# Patient Record
Sex: FEMALE | Race: ASIAN | NOT HISPANIC OR LATINO | Employment: UNEMPLOYED | URBAN - METROPOLITAN AREA
[De-identification: names, ages, dates, MRNs, and addresses within clinical notes are randomized per-mention and may not be internally consistent; named-entity substitution may affect disease eponyms.]

---

## 2023-04-10 ENCOUNTER — HOSPITAL ENCOUNTER (OUTPATIENT)
Dept: DATA CONVERSION | Facility: HOSPITAL | Age: 58
End: 2023-04-10
Attending: ANESTHESIOLOGY | Admitting: ANESTHESIOLOGY
Payer: MEDICAID

## 2023-04-10 DIAGNOSIS — M54.2 CERVICALGIA: ICD-10-CM

## 2023-04-10 DIAGNOSIS — E78.5 HYPERLIPIDEMIA, UNSPECIFIED: ICD-10-CM

## 2023-04-10 DIAGNOSIS — I10 ESSENTIAL (PRIMARY) HYPERTENSION: ICD-10-CM

## 2023-04-10 DIAGNOSIS — M54.12 RADICULOPATHY, CERVICAL REGION: ICD-10-CM

## 2023-04-10 DIAGNOSIS — E11.9 TYPE 2 DIABETES MELLITUS WITHOUT COMPLICATIONS (MULTI): ICD-10-CM

## 2023-04-10 DIAGNOSIS — N28.89 OTHER SPECIFIED DISORDERS OF KIDNEY AND URETER: ICD-10-CM

## 2023-04-10 LAB — POCT GLUCOSE: 98 MG/DL (ref 74–99)

## 2023-10-02 NOTE — OP NOTE
Post Operative Note:     Post-Procedure Diagnosis: Cervical radiculitis   Procedure: 1.   2.   3.   4.   5.   Surgeon: Nikhil   Resident/Fellow/Other Assistant: Edwin   Estimated Blood Loss (mL): none   Specimen: no   Findings: None     Operative Report Dictated:  Dictation: not applicable - note contains Operative  Report   Operative Report:    Preoperative diagnosis:  Cervical radiculitis  Postoperative diagnosis:  Cervical radiculitis  Procedure: C6/7 cervical epidural steroid injection under fluoroscopic guidance  Surgeon: Marylou Tejeda  Assistant:  Fellow  Anesthesia: Local  Complications: Apparently none    Clinical note: Luis is a 58-year-old female with history of neck and recurrent upper extremity symptoms who presents today for the aforementioned procedure.    Procedure note: The patient was met in the preoperative holding area after risks benefits and alternatives to procedure were discussed with the patient, informed consent was obtained. Patient brought back to the procedure room and placed in the prone  position on the fluoroscopy table. Area over the neck was exposed, prepped, draped, in the usual sterile fashion.  Skin and subcutaneous tissues to the cervical intralaminar space was anesthetized using 0.5% lidocaine.  An 18-gauge Tuohy needle was inserted  in the skin and advanced into the interlaminar space. A glass syringe was used to achieve the epidural space using the loss resistance technique. Contrast was injected which showed appropriate epidural spread, no intravascular or intrathecal uptake. A  total of 2 mL's of 0.5% lidocaine mixed with 40 mg methylprednisolone was injected. Needle removed, bandage applied, patient tolerated the procedure well with no immediate complications.      Attestation:   Note Completion:  Attending Attestation I was present for the entire procedure         Electronic Signatures:  Marylou Tejeda)  (Signed 10-Apr-2023 08:32)   Authored: Post  Operative Note, Note Completion      Last Updated: 10-Apr-2023 08:32 by Marylou Tejeda)

## 2023-10-23 ENCOUNTER — LAB (OUTPATIENT)
Dept: LAB | Facility: LAB | Age: 58
End: 2023-10-23
Payer: MEDICAID

## 2023-10-24 PROCEDURE — 86833 HLA CLASS II HIGH DEFIN QUAL: CPT | Mod: OUT | Performed by: SURGERY

## 2023-10-26 ENCOUNTER — LAB REQUISITION (OUTPATIENT)
Dept: LAB | Facility: CLINIC | Age: 58
End: 2023-10-26
Payer: MEDICAID

## 2023-10-26 DIAGNOSIS — N18.6 END STAGE RENAL DISEASE (MULTI): ICD-10-CM

## 2023-10-30 ENCOUNTER — DOCUMENTATION (OUTPATIENT)
Dept: TRANSPLANT | Facility: HOSPITAL | Age: 58
End: 2023-10-30
Payer: MEDICAID

## 2023-10-30 DIAGNOSIS — Z01.818 PRE-TRANSPLANT EVALUATION FOR KIDNEY TRANSPLANT: ICD-10-CM

## 2023-10-30 NOTE — PROGRESS NOTES
Simon called requesting the CBC order for his mom; I entered this in Epic and let him know.  We need to review this to see if her thrombocytopenia is better.

## 2023-11-03 LAB
HLA RESULTS: NORMAL
HLA-A+B+C AB NFR SER: NORMAL %
HLA-DP+DQ+DR AB NFR SER: NORMAL %

## 2023-11-10 ENCOUNTER — LAB (OUTPATIENT)
Dept: LAB | Facility: LAB | Age: 58
End: 2023-11-10
Payer: MEDICAID

## 2023-11-10 DIAGNOSIS — Z01.818 PRE-TRANSPLANT EVALUATION FOR KIDNEY TRANSPLANT: ICD-10-CM

## 2023-11-10 LAB
ERYTHROCYTE [DISTWIDTH] IN BLOOD BY AUTOMATED COUNT: 13 % (ref 11.5–14.5)
HCT VFR BLD AUTO: 30 % (ref 36–46)
HGB BLD-MCNC: 10 G/DL (ref 12–16)
MCH RBC QN AUTO: 32.7 PG (ref 26–34)
MCHC RBC AUTO-ENTMCNC: 33.3 G/DL (ref 32–36)
MCV RBC AUTO: 98 FL (ref 80–100)
NRBC BLD-RTO: 0 /100 WBCS (ref 0–0)
PLATELET # BLD AUTO: 66 X10*3/UL (ref 150–450)
RBC # BLD AUTO: 3.06 X10*6/UL (ref 4–5.2)
WBC # BLD AUTO: 6.4 X10*3/UL (ref 4.4–11.3)

## 2023-11-10 PROCEDURE — 36415 COLL VENOUS BLD VENIPUNCTURE: CPT

## 2023-11-10 PROCEDURE — 85027 COMPLETE CBC AUTOMATED: CPT

## 2023-11-15 ENCOUNTER — TELEPHONE (OUTPATIENT)
Dept: TRANSPLANT | Facility: HOSPITAL | Age: 58
End: 2023-11-15
Payer: MEDICAID

## 2023-11-15 DIAGNOSIS — D69.6 THROMBOCYTOPENIA (CMS-HCC): ICD-10-CM

## 2023-11-15 NOTE — TELEPHONE ENCOUNTER
Spoke to son; let him know his mom's platelet counts were still low and that she needed to see the hematologist.  I explained that platelets help with blood clotting.  He asked if there was a treatment for this and I assured him that there was.  Let him know that the referrals department would reach out to schedule an appt for him and if they didn't call him in a week he should call us back for assistance.  No further questions at this time. Referral to heme/onc (benign) entered in Epic.

## 2023-12-28 PROBLEM — H25.13 AGE-RELATED NUCLEAR CATARACT OF BOTH EYES: Chronic | Status: ACTIVE | Noted: 2017-08-10

## 2023-12-28 PROBLEM — E11.9 TYPE 2 DIABETES MELLITUS WITHOUT COMPLICATION, WITHOUT LONG-TERM CURRENT USE OF INSULIN (MULTI): Chronic | Status: ACTIVE | Noted: 2017-08-10

## 2023-12-28 PROBLEM — R41.3 MEMORY LOSS: Status: ACTIVE | Noted: 2017-03-31

## 2023-12-28 PROBLEM — D69.6 PLATELETS DECREASED (CMS-HCC): Status: ACTIVE | Noted: 2023-12-27

## 2023-12-28 PROBLEM — E66.9 OBESITY, CLASS I, BMI 30-34.9: Status: ACTIVE | Noted: 2019-02-20

## 2023-12-28 PROBLEM — E66.811 OBESITY, CLASS I, BMI 30-34.9: Status: ACTIVE | Noted: 2019-02-20

## 2023-12-28 PROBLEM — E11.22 DIABETES MELLITUS WITH CHRONIC KIDNEY DISEASE (MULTI): Status: ACTIVE | Noted: 2023-12-28

## 2023-12-28 PROBLEM — I10 ESSENTIAL HYPERTENSION: Status: ACTIVE | Noted: 2017-03-31

## 2023-12-28 PROBLEM — R10.9 RIGHT FLANK PAIN: Status: ACTIVE | Noted: 2020-01-13

## 2023-12-28 PROBLEM — I67.1 CEREBRAL ANEURYSM, NONRUPTURED (HHS-HCC): Status: ACTIVE | Noted: 2022-06-06

## 2023-12-28 PROBLEM — I77.0 ARTERIOVENOUS FISTULA (CMS-HCC): Status: ACTIVE | Noted: 2022-12-09

## 2023-12-28 PROBLEM — D64.9 ANEMIA: Status: ACTIVE | Noted: 2022-05-26

## 2023-12-28 PROBLEM — E78.2 MIXED HYPERLIPIDEMIA: Status: ACTIVE | Noted: 2023-04-10

## 2023-12-28 PROBLEM — N18.9 CKD (CHRONIC KIDNEY DISEASE): Status: ACTIVE | Noted: 2017-03-31

## 2023-12-28 PROBLEM — M79.89 LEFT ARM SWELLING: Status: ACTIVE | Noted: 2017-12-19

## 2023-12-28 PROBLEM — G47.00 INSOMNIA: Status: ACTIVE | Noted: 2022-05-26

## 2023-12-28 PROBLEM — Q61.3 POLYCYSTIC KIDNEY, UNSPECIFIED: Status: ACTIVE | Noted: 2017-09-08

## 2023-12-28 PROBLEM — H52.4 PRESBYOPIA: Chronic | Status: ACTIVE | Noted: 2017-08-10

## 2023-12-28 PROBLEM — H54.7 VISION IMPAIRMENT: Status: ACTIVE | Noted: 2017-03-31

## 2023-12-28 PROBLEM — K21.9 GASTROESOPHAGEAL REFLUX DISEASE WITHOUT ESOPHAGITIS: Status: ACTIVE | Noted: 2017-03-31

## 2023-12-28 RX ORDER — CHOLECALCIFEROL (VITAMIN D3) 50 MCG
50 TABLET ORAL DAILY
COMMUNITY
Start: 2020-08-05

## 2023-12-28 RX ORDER — OMEPRAZOLE 40 MG/1
40 CAPSULE, DELAYED RELEASE ORAL
COMMUNITY
Start: 2023-11-28

## 2023-12-28 RX ORDER — FERROUS SULFATE 325(65) MG
1 TABLET ORAL 3 TIMES DAILY
COMMUNITY
Start: 2022-05-26

## 2023-12-28 RX ORDER — SODIUM BICARBONATE 650 MG/1
650 TABLET ORAL
COMMUNITY
Start: 2023-09-27

## 2023-12-28 RX ORDER — HYDRALAZINE HYDROCHLORIDE 25 MG/1
25 TABLET, FILM COATED ORAL 3 TIMES DAILY
COMMUNITY
Start: 2023-11-28

## 2023-12-28 RX ORDER — CARVEDILOL 25 MG/1
25 TABLET ORAL
COMMUNITY
Start: 2018-11-06

## 2023-12-28 RX ORDER — ASPIRIN 81 MG/1
81 TABLET ORAL DAILY
COMMUNITY
Start: 2023-09-22 | End: 2024-03-20

## 2023-12-28 RX ORDER — CALCITRIOL 0.25 UG/1
0.25 CAPSULE ORAL DAILY
COMMUNITY
Start: 2022-05-26

## 2023-12-28 RX ORDER — GLIPIZIDE 5 MG/1
5 TABLET ORAL DAILY
COMMUNITY
Start: 2018-11-06

## 2023-12-28 RX ORDER — LANCETS 33 GAUGE
EACH MISCELLANEOUS
COMMUNITY
Start: 2023-04-06

## 2023-12-28 RX ORDER — SIMVASTATIN 20 MG/1
1 TABLET, FILM COATED ORAL DAILY
COMMUNITY
Start: 2022-05-26

## 2023-12-28 RX ORDER — TRAZODONE HYDROCHLORIDE 50 MG/1
1 TABLET ORAL NIGHTLY PRN
COMMUNITY
Start: 2022-05-26

## 2023-12-28 RX ORDER — ONDANSETRON 4 MG/1
4 TABLET, ORALLY DISINTEGRATING ORAL EVERY 6 HOURS
COMMUNITY

## 2024-01-10 ENCOUNTER — OFFICE VISIT (OUTPATIENT)
Dept: HEMATOLOGY/ONCOLOGY | Facility: CLINIC | Age: 59
End: 2024-01-10
Payer: MEDICAID

## 2024-01-10 VITALS
SYSTOLIC BLOOD PRESSURE: 149 MMHG | HEART RATE: 89 BPM | BODY MASS INDEX: 29.37 KG/M2 | RESPIRATION RATE: 16 BRPM | WEIGHT: 159.61 LBS | OXYGEN SATURATION: 99 % | TEMPERATURE: 97.9 F | DIASTOLIC BLOOD PRESSURE: 79 MMHG | HEIGHT: 62 IN

## 2024-01-10 DIAGNOSIS — D69.6 THROMBOCYTOPENIA (CMS-HCC): Primary | ICD-10-CM

## 2024-01-10 LAB
ALBUMIN SERPL BCP-MCNC: 3.9 G/DL (ref 3.4–5)
ALP SERPL-CCNC: 67 U/L (ref 33–136)
ALT SERPL W P-5'-P-CCNC: 19 U/L (ref 7–45)
ANION GAP SERPL CALC-SCNC: 14 MMOL/L (ref 10–20)
APTT PPP: 34 SECONDS (ref 27–38)
AST SERPL W P-5'-P-CCNC: 20 U/L (ref 9–39)
BASOPHILS # BLD AUTO: 0.03 X10*3/UL (ref 0–0.1)
BASOPHILS NFR BLD AUTO: 0.3 %
BILIRUB SERPL-MCNC: 0.6 MG/DL (ref 0–1.2)
BUN SERPL-MCNC: 46 MG/DL (ref 6–23)
CALCIUM SERPL-MCNC: 8.9 MG/DL (ref 8.6–10.3)
CHLORIDE SERPL-SCNC: 110 MMOL/L (ref 98–107)
CO2 SERPL-SCNC: 18 MMOL/L (ref 21–32)
CREAT SERPL-MCNC: 5.58 MG/DL (ref 0.5–1.05)
EGFRCR SERPLBLD CKD-EPI 2021: 8 ML/MIN/1.73M*2
EOSINOPHIL # BLD AUTO: 0.14 X10*3/UL (ref 0–0.7)
EOSINOPHIL NFR BLD AUTO: 1.5 %
ERYTHROCYTE [DISTWIDTH] IN BLOOD BY AUTOMATED COUNT: 13.5 % (ref 11.5–14.5)
FERRITIN SERPL-MCNC: 557 NG/ML (ref 8–150)
FIBRINOGEN PPP-MCNC: 345 MG/DL (ref 200–400)
GLUCOSE SERPL-MCNC: 212 MG/DL (ref 74–99)
HCT VFR BLD AUTO: 30.1 % (ref 36–46)
HGB BLD-MCNC: 9.4 G/DL (ref 12–16)
IMM GRANULOCYTES # BLD AUTO: 0.01 X10*3/UL (ref 0–0.7)
IMM GRANULOCYTES NFR BLD AUTO: 0.1 % (ref 0–0.9)
INR PPP: 1.1 (ref 0.9–1.1)
IRON SATN MFR SERPL: 31 % (ref 25–45)
IRON SERPL-MCNC: 81 UG/DL (ref 35–150)
LDH SERPL L TO P-CCNC: 142 U/L (ref 84–246)
LYMPHOCYTES # BLD AUTO: 1.07 X10*3/UL (ref 1.2–4.8)
LYMPHOCYTES NFR BLD AUTO: 11.2 %
MCH RBC QN AUTO: 32.8 PG (ref 26–34)
MCHC RBC AUTO-ENTMCNC: 31.2 G/DL (ref 32–36)
MCV RBC AUTO: 105 FL (ref 80–100)
MONOCYTES # BLD AUTO: 0.59 X10*3/UL (ref 0.1–1)
MONOCYTES NFR BLD AUTO: 6.2 %
NEUTROPHILS # BLD AUTO: 7.68 X10*3/UL (ref 1.2–7.7)
NEUTROPHILS NFR BLD AUTO: 80.7 %
NRBC BLD-RTO: ABNORMAL /100{WBCS}
PLATELET # BLD AUTO: 76 X10*3/UL (ref 150–450)
PLATELET # BLD AUTO: 89 X10*3/UL (ref 150–450)
POTASSIUM SERPL-SCNC: 3.7 MMOL/L (ref 3.5–5.3)
PROT SERPL-MCNC: 7 G/DL (ref 6.4–8.2)
PROTHROMBIN TIME: 12.8 SECONDS (ref 9.8–12.8)
RBC # BLD AUTO: 2.87 X10*6/UL (ref 4–5.2)
RBC MORPH BLD: NORMAL
SODIUM SERPL-SCNC: 138 MMOL/L (ref 136–145)
TIBC SERPL-MCNC: 261 UG/DL (ref 240–445)
UIBC SERPL-MCNC: 180 UG/DL (ref 110–370)
WBC # BLD AUTO: 9.5 X10*3/UL (ref 4.4–11.3)

## 2024-01-10 PROCEDURE — 86334 IMMUNOFIX E-PHORESIS SERUM: CPT | Mod: GEALAB | Performed by: PHYSICIAN ASSISTANT

## 2024-01-10 PROCEDURE — 81450 HL NEO GSAP 5-50DNA/DNA&RNA: CPT | Performed by: PHYSICIAN ASSISTANT

## 2024-01-10 PROCEDURE — 86706 HEP B SURFACE ANTIBODY: CPT | Mod: GEALAB | Performed by: PHYSICIAN ASSISTANT

## 2024-01-10 PROCEDURE — 85025 COMPLETE CBC W/AUTO DIFF WBC: CPT | Performed by: PHYSICIAN ASSISTANT

## 2024-01-10 PROCEDURE — 82607 VITAMIN B-12: CPT | Mod: GEALAB | Performed by: PHYSICIAN ASSISTANT

## 2024-01-10 PROCEDURE — 86320 SERUM IMMUNOELECTROPHORESIS: CPT | Performed by: PHYSICIAN ASSISTANT

## 2024-01-10 PROCEDURE — 84165 PROTEIN E-PHORESIS SERUM: CPT | Performed by: PHYSICIAN ASSISTANT

## 2024-01-10 PROCEDURE — 82728 ASSAY OF FERRITIN: CPT | Performed by: PHYSICIAN ASSISTANT

## 2024-01-10 PROCEDURE — 87340 HEPATITIS B SURFACE AG IA: CPT | Mod: GEALAB | Performed by: PHYSICIAN ASSISTANT

## 2024-01-10 PROCEDURE — 85384 FIBRINOGEN ACTIVITY: CPT | Performed by: PHYSICIAN ASSISTANT

## 2024-01-10 PROCEDURE — 83521 IG LIGHT CHAINS FREE EACH: CPT | Mod: GEALAB | Performed by: PHYSICIAN ASSISTANT

## 2024-01-10 PROCEDURE — 82784 ASSAY IGA/IGD/IGG/IGM EACH: CPT | Mod: GEALAB | Performed by: PHYSICIAN ASSISTANT

## 2024-01-10 PROCEDURE — 84155 ASSAY OF PROTEIN SERUM: CPT | Mod: GEALAB,59 | Performed by: PHYSICIAN ASSISTANT

## 2024-01-10 PROCEDURE — 85049 AUTOMATED PLATELET COUNT: CPT | Mod: 59 | Performed by: PHYSICIAN ASSISTANT

## 2024-01-10 PROCEDURE — 82668 ASSAY OF ERYTHROPOIETIN: CPT | Performed by: PHYSICIAN ASSISTANT

## 2024-01-10 PROCEDURE — 87521 HEPATITIS C PROBE&RVRS TRNSC: CPT | Mod: GEALAB | Performed by: PHYSICIAN ASSISTANT

## 2024-01-10 PROCEDURE — 88185 FLOWCYTOMETRY/TC ADD-ON: CPT | Mod: TC | Performed by: PHYSICIAN ASSISTANT

## 2024-01-10 PROCEDURE — 83550 IRON BINDING TEST: CPT | Performed by: PHYSICIAN ASSISTANT

## 2024-01-10 PROCEDURE — 3078F DIAST BP <80 MM HG: CPT | Performed by: PHYSICIAN ASSISTANT

## 2024-01-10 PROCEDURE — 99215 OFFICE O/P EST HI 40 MIN: CPT | Performed by: PHYSICIAN ASSISTANT

## 2024-01-10 PROCEDURE — 85610 PROTHROMBIN TIME: CPT | Performed by: PHYSICIAN ASSISTANT

## 2024-01-10 PROCEDURE — 1036F TOBACCO NON-USER: CPT | Performed by: PHYSICIAN ASSISTANT

## 2024-01-10 PROCEDURE — 85730 THROMBOPLASTIN TIME PARTIAL: CPT | Performed by: PHYSICIAN ASSISTANT

## 2024-01-10 PROCEDURE — G0452 MOLECULAR PATHOLOGY INTERPR: HCPCS | Performed by: PATHOLOGY

## 2024-01-10 PROCEDURE — 87389 HIV-1 AG W/HIV-1&-2 AB AG IA: CPT | Mod: GEALAB | Performed by: PHYSICIAN ASSISTANT

## 2024-01-10 PROCEDURE — 80053 COMPREHEN METABOLIC PANEL: CPT | Performed by: PHYSICIAN ASSISTANT

## 2024-01-10 PROCEDURE — 36415 COLL VENOUS BLD VENIPUNCTURE: CPT | Performed by: PHYSICIAN ASSISTANT

## 2024-01-10 PROCEDURE — 99205 OFFICE O/P NEW HI 60 MIN: CPT | Performed by: PHYSICIAN ASSISTANT

## 2024-01-10 PROCEDURE — 83950 ONCOPROTEIN HER-2/NEU: CPT | Performed by: PHYSICIAN ASSISTANT

## 2024-01-10 PROCEDURE — 83615 LACTATE (LD) (LDH) ENZYME: CPT | Performed by: PHYSICIAN ASSISTANT

## 2024-01-10 PROCEDURE — 88237 TISSUE CULTURE BONE MARROW: CPT | Performed by: PHYSICIAN ASSISTANT

## 2024-01-10 PROCEDURE — 86704 HEP B CORE ANTIBODY TOTAL: CPT | Mod: GEALAB | Performed by: PHYSICIAN ASSISTANT

## 2024-01-10 PROCEDURE — 3077F SYST BP >= 140 MM HG: CPT | Performed by: PHYSICIAN ASSISTANT

## 2024-01-10 ASSESSMENT — COLUMBIA-SUICIDE SEVERITY RATING SCALE - C-SSRS
6. HAVE YOU EVER DONE ANYTHING, STARTED TO DO ANYTHING, OR PREPARED TO DO ANYTHING TO END YOUR LIFE?: NO
2. HAVE YOU ACTUALLY HAD ANY THOUGHTS OF KILLING YOURSELF?: NO
1. IN THE PAST MONTH, HAVE YOU WISHED YOU WERE DEAD OR WISHED YOU COULD GO TO SLEEP AND NOT WAKE UP?: NO

## 2024-01-10 ASSESSMENT — ENCOUNTER SYMPTOMS
LOSS OF SENSATION IN FEET: 0
DEPRESSION: 0
OCCASIONAL FEELINGS OF UNSTEADINESS: 0

## 2024-01-10 ASSESSMENT — PATIENT HEALTH QUESTIONNAIRE - PHQ9
1. LITTLE INTEREST OR PLEASURE IN DOING THINGS: NOT AT ALL
2. FEELING DOWN, DEPRESSED OR HOPELESS: NOT AT ALL
SUM OF ALL RESPONSES TO PHQ9 QUESTIONS 1 AND 2: 0

## 2024-01-10 ASSESSMENT — PAIN SCALES - GENERAL: PAINLEVEL: 5

## 2024-01-10 NOTE — PROGRESS NOTES
Reason for Visit  Luis Panda is a 58 y.o. female w/PMH s/f polycystic kidney disease referred by Dr. Luciano for thrombocytopenia prior to being  placed on kidney transplant list.     PMH/PSH: PKD, DMII, HTN, HL, fistula left arm.  FH: Denies FH of cancers, bleeding or clotting disorder.   Soc Hx: former smoker, denies ETOH, illicit drugs; , 4 children.    History of Present Illness:  Upon review of labs, noted to have thrombocytopenia since 2021 in the 's. Normocytic-macrocytic anemia. Normal WBC.    Patient presents with son and . Translation done by son. On assessment, reports long standing h/o constipation and chronic shoulder pain otherwise feeling well. Denies bleeding or bruising.     Review of Systems: All of the systems have been reviewed and are negative for complaints except what is stated in the HPI and/or past medical history.    Allergies and Intolerances:  Allergies   Allergen Reactions    Amlodipine Unknown     LEG SWELLING    Oxycodone Unknown     Dizziness, weakness            Current Outpatient Medications   Medication Sig Dispense Refill    aspirin 81 mg EC tablet Take 1 tablet (81 mg) by mouth once daily.      calcitriol (Rocaltrol) 0.25 mcg capsule Take 1 capsule (0.25 mcg) by mouth once daily.      carvedilol (Coreg) 25 mg tablet Take 1 tablet (25 mg) by mouth 2 times a day with meals.      cholecalciferol (Vitamin D-3) 50 MCG (2000 UT) tablet Take 1 tablet (50 mcg) by mouth once daily.      ferrous sulfate, 325 mg ferrous sulfate, tablet Take 1 tablet by mouth 3 times a day.      glipiZIDE (Glucotrol) 5 mg tablet Take 1 tablet (5 mg) by mouth once daily.      hydrALAZINE (Apresoline) 25 mg tablet Take 1 tablet (25 mg) by mouth 3 times a day.      omeprazole (PriLOSEC) 40 mg DR capsule Take 1 capsule (40 mg) by mouth once daily.      ondansetron ODT (Zofran-ODT) 4 mg disintegrating tablet 1 tablet (4 mg) every 6 hours.      simvastatin (Zocor) 20 mg tablet Take 1  tablet (20 mg) by mouth once daily.      sodium bicarbonate 650 mg tablet Take 1 tablet (650 mg) by mouth.      traZODone (Desyrel) 50 mg tablet Take 1 tablet (50 mg) by mouth as needed at bedtime for sleep.      TRUEplus Lancets 33 gauge misc        No current facility-administered medications for this visit.        Vitals and Measurements:   Visit Vitals  /79 (BP Location: Right arm)   Pulse 89   Temp 36.6 °C (97.9 °F)   Resp 16        Physical Exam:   Constitutional: alert, awake and oriented, not in acute distress   HEENT: moist mucous membranes, normal nose   Neck: supple, no lymphadenopathy   EYES: PERRL, EOM intact, conjunctiva normal  Skin: no jaundice, rash or erythema  Neurological: AAOx3, no gross focal deficit   Psychiatric: normal mood and behavior     Lab Results   Component Value Date    WBC 9.5 01/10/2024    NEUTROABS 7.68 01/10/2024    IGABSOL 0.01 01/10/2024    LYMPHSABS 1.07 (L) 01/10/2024    MONOSABS 0.59 01/10/2024    EOSABS 0.14 01/10/2024    BASOSABS 0.03 01/10/2024    RBC 2.87 (L) 01/10/2024     (H) 01/10/2024    MCHC 31.2 (L) 01/10/2024    HGB 9.4 (L) 01/10/2024    HCT 30.1 (L) 01/10/2024    PLT 89 (L) 01/10/2024     Lab Results   Component Value Date    CREATININE 5.58 (H) 01/10/2024    BUN 46 (H) 01/10/2024    EGFR 8 (L) 01/10/2024     01/10/2024    K 3.7 01/10/2024     (H) 01/10/2024    CO2 18 (L) 01/10/2024      Lab Results   Component Value Date    ALT 19 01/10/2024    AST 20 01/10/2024    ALKPHOS 67 01/10/2024    BILITOT 0.6 01/10/2024      Lab Results   Component Value Date    IRON 81 01/10/2024    TIBC 261 01/10/2024    FERRITIN 557 (H) 01/10/2024      Lab Results   Component Value Date     01/10/2024     Assessment:    58 y.o. female w/PMH s/f polycystic kidney disease referred for thrombocytopenia prior to being placed on kidney transplant list.         Plan:    Reviewed and discussed lab, imaging, and pathology results with patient in detail as well  as diagnosis, prognosis, and treatment options.    Discussed sending work up to r/o platelet clumping, nutritional, viral etiologies. Will sed PB flow.    Discussed performing Bmbx if all is negative for definitive diagnosis.    F/U w/PCP and renal    RTC in 2 weeks    Patient verbalized understanding, and all her questions were answered to her satisfaction    60 min spent with patient greater than 50 % of which was spent in consultation and coordination of care.

## 2024-01-11 ENCOUNTER — DOCUMENTATION (OUTPATIENT)
Dept: TRANSPLANT | Facility: HOSPITAL | Age: 59
End: 2024-01-11
Payer: MEDICAID

## 2024-01-11 LAB
HBV CORE AB SER QL: NONREACTIVE
HBV SURFACE AB SER-ACNC: 4.2 MIU/ML
HBV SURFACE AG SERPL QL IA: NONREACTIVE
HCV RNA SERPL NAA+PROBE-ACNC: NOT DETECTED K[IU]/ML
HCV RNA SERPL NAA+PROBE-LOG IU: NORMAL {LOG_IU}/ML
HIV 1+2 AB+HIV1 P24 AG SERPL QL IA: NONREACTIVE
IGA SERPL-MCNC: 355 MG/DL (ref 70–400)
IGG SERPL-MCNC: 1750 MG/DL (ref 700–1600)
IGM SERPL-MCNC: 42 MG/DL (ref 40–230)
KAPPA LC SERPL-MCNC: 19.36 MG/DL (ref 0.33–1.94)
KAPPA LC/LAMBDA SER: 2.16 {RATIO} (ref 0.26–1.65)
LABORATORY COMMENT REPORT: NORMAL
LAMBDA LC SERPL-MCNC: 8.98 MG/DL (ref 0.57–2.63)
PROT SERPL-MCNC: 7.1 G/DL (ref 6.4–8.2)
VIT B12 SERPL-MCNC: 311 PG/ML (ref 211–911)

## 2024-01-12 LAB
ALBUMIN: 3.9 G/DL (ref 3.4–5)
ALPHA 1 GLOBULIN: 0.3 G/DL (ref 0.2–0.6)
ALPHA 2 GLOBULIN: 0.6 G/DL (ref 0.4–1.1)
BETA GLOBULIN: 0.8 G/DL (ref 0.5–1.2)
EPO SERPL-ACNC: 15 MU/ML (ref 4–27)
GAMMA GLOBULIN: 1.5 G/DL (ref 0.5–1.4)
IMMUNOFIXATION COMMENT: ABNORMAL
PATH REVIEW - SERUM IMMUNOFIXATION: ABNORMAL
PATH REVIEW-SERUM PROTEIN ELECTROPHORESIS: ABNORMAL
PROTEIN ELECTROPHORESIS COMMENT: ABNORMAL

## 2024-01-15 LAB
CELL COUNT (BLOOD): 9.5 X10*3/UL
CELL POPULATIONS: NORMAL
CYTOGENETICS/MOLECULAR TEST ORDERED: NO
DIAGNOSIS: NORMAL
FLOW DIFFERENTIAL: NORMAL
FLOW TEST ORDERED: NORMAL
LAB TEST METHOD: NORMAL
NUMBER OF CELLS COLLECTED: NORMAL PER TUBE
PATH REPORT.TOTAL CANCER: NORMAL
RBC MORPH BLD: NORMAL
SIGNATURE COMMENT: NORMAL
SPECIMEN VIABILITY: NORMAL
WBC MORPH BLD: NORMAL

## 2024-01-16 ENCOUNTER — OFFICE VISIT (OUTPATIENT)
Dept: ORTHOPEDIC SURGERY | Facility: CLINIC | Age: 59
End: 2024-01-16
Payer: MEDICAID

## 2024-01-16 DIAGNOSIS — M25.511 RIGHT SHOULDER PAIN, UNSPECIFIED CHRONICITY: Primary | ICD-10-CM

## 2024-01-16 PROCEDURE — 99214 OFFICE O/P EST MOD 30 MIN: CPT | Performed by: ORTHOPAEDIC SURGERY

## 2024-01-16 PROCEDURE — 1036F TOBACCO NON-USER: CPT | Performed by: ORTHOPAEDIC SURGERY

## 2024-01-17 ENCOUNTER — DOCUMENTATION (OUTPATIENT)
Dept: TRANSPLANT | Facility: HOSPITAL | Age: 59
End: 2024-01-17
Payer: MEDICAID

## 2024-01-17 NOTE — PROGRESS NOTES
HPI:Luis Panda is a 58-year-old woman, who comes in today with complaints of right shoulder pain and decreased range of motion.  She denies any pain going down the arm.  Previously she has had some injection in the shoulder which gave her relief on the contralateral side.  Patient does have a history of diabetes.      ROS:  Reviewed on EMR and patient intake sheet.    PMH/SH:   Reviewed on EMR and patient intake sheet.    Exam:  Physical Exam    Constitutional: Well appearing; no acute distress  Eyes: pupils are equal and round  Psych: normal affect  Respiratory: non-labored breathing  Cardiovascular: regular rate and rhythm  GI: non-distended abdomen  Musculoskeletal: Pain and decreased range of motion in the right shoulder.  She has decreased external rotation internal rotation and abduction  Neurologic: [4]/5 strength in the upper extremities bilaterally]; [negative Duffy's]; [no hyper-reflexia]; negative Spurling's    Radiology:  MRI of the cervical spine does not demonstrate any significant cervical stenosis    Diagnosis:  Right shoulder pain    Assessment and Plan:   58-year-old woman, with right shoulder pain and decreased range of motion which may be secondary to adhesive capsulitis, given her history of diabetes.  At this time I recommended physical therapy and follow-up with one of my shoulder colleagues.  This is not a radicular presentation at this time and will not require cervical spine intervention at this time.    The patient was in agreement with the plan. At the end of the visit today, the patient felt that all questions had been answered satisfactorily.  The patient was pleased with the visit and very appreciative for the care rendered.     Thank you very much for the kind referral.  It is a privilege, and a pleasure, to partner with you in the care of your patients.  I would be delighted to assist you with any further consultations as needed.      Victor M Simmons MD    Chief of Spine  Surgery, [facility]  Director of Spine Service, [facility]  , Department of Orthopaedics  [facility] [employer]  [address] [place] [address]  P: [phone]    This note was dictated with voice recognition software.  It has not been proofread for grammatical errors, typographical mistakes or other semantic inconsistencies.

## 2024-01-17 NOTE — PROGRESS NOTES
Pt saw heme/onc on 1/11, waiting on results for more definitive answer, she is going back in 2 weeks to discuss the results with them.

## 2024-01-22 LAB
ELECTRONICALLY SIGNED BY: NORMAL
MYELOID NGS RESULTS: NORMAL

## 2024-01-23 ENCOUNTER — TELEMEDICINE (OUTPATIENT)
Dept: HEMATOLOGY/ONCOLOGY | Facility: CLINIC | Age: 59
End: 2024-01-23
Payer: MEDICAID

## 2024-01-23 DIAGNOSIS — D69.6 THROMBOCYTOPENIA (CMS-HCC): ICD-10-CM

## 2024-01-23 DIAGNOSIS — C91.10 CLL (CHRONIC LYMPHOCYTIC LEUKEMIA) (MULTI): ICD-10-CM

## 2024-01-23 LAB
CHROM ANALY OVERALL INTERP-IMP: NORMAL
ELECTRONICALLY SIGNED BY CYTOGENETICS: NORMAL

## 2024-01-23 PROCEDURE — 99214 OFFICE O/P EST MOD 30 MIN: CPT | Performed by: PHYSICIAN ASSISTANT

## 2024-01-24 NOTE — PROGRESS NOTES
Visit Type: Benign Heme Follow-up, Telephone Visit:   A telephone visit (audio only) between the patient (at the originating site) and the provider (at the distant site) was utilized to provide this telehealth service.   Verbal Consent for Encounter: Verbal consent was  requested and obtained from patient, or from parent/guardian if minor, on this date for a telehealth visit.     Reason for Visit  Luis Panda is a 58 y.o. female w/PMH s/f polycystic kidney disease referred by Dr. Luciano for thrombocytopenia prior to being  placed on kidney transplant list.     Upon review of labs, noted to have thrombocytopenia since 2021 in the 's. Normocytic-macrocytic anemia. Normal WBC.    Patient presents with son and . Translation done by son. On assessment, reports long standing h/o constipation and chronic shoulder pain otherwise feeling well. Denies bleeding or bruising.     PMH/PSH: PKD, DMII, HTN, HL, fistula left arm.  FH: Denies FH of cancers, bleeding or clotting disorder.   Soc Hx: former smoker, denies ETOH, illicit drugs; , 4 children.    History of Present Illness:  Spoke to son. Patient doing well.    Review of Systems: All of the systems have been reviewed and are negative for complaints except what is stated in the HPI and/or past medical history.    Allergies and Intolerances:  Allergies   Allergen Reactions    Amlodipine Unknown     LEG SWELLING    Oxycodone Unknown     Dizziness, weakness            Current Outpatient Medications   Medication Sig Dispense Refill    aspirin 81 mg EC tablet Take 1 tablet (81 mg) by mouth once daily.      calcitriol (Rocaltrol) 0.25 mcg capsule Take 1 capsule (0.25 mcg) by mouth once daily.      carvedilol (Coreg) 25 mg tablet Take 1 tablet (25 mg) by mouth 2 times a day with meals.      cholecalciferol (Vitamin D-3) 50 MCG (2000 UT) tablet Take 1 tablet (50 mcg) by mouth once daily.      ferrous sulfate, 325 mg ferrous sulfate, tablet Take 1 tablet  by mouth 3 times a day.      glipiZIDE (Glucotrol) 5 mg tablet Take 1 tablet (5 mg) by mouth once daily.      hydrALAZINE (Apresoline) 25 mg tablet Take 1 tablet (25 mg) by mouth 3 times a day.      omeprazole (PriLOSEC) 40 mg DR capsule Take 1 capsule (40 mg) by mouth once daily.      ondansetron ODT (Zofran-ODT) 4 mg disintegrating tablet 1 tablet (4 mg) every 6 hours.      simvastatin (Zocor) 20 mg tablet Take 1 tablet (20 mg) by mouth once daily.      sodium bicarbonate 650 mg tablet Take 1 tablet (650 mg) by mouth.      traZODone (Desyrel) 50 mg tablet Take 1 tablet (50 mg) by mouth as needed at bedtime for sleep.      TRUEplus Lancets 33 gauge misc        No current facility-administered medications for this visit.        Vitals and Measurements:   There were no vitals taken for this visit.       Physical Exam:   Deferred due to telehealth     Lab Results   Component Value Date    WBC 9.5 01/10/2024    NEUTROABS 7.68 01/10/2024    IGABSOL 0.01 01/10/2024    LYMPHSABS 1.07 (L) 01/10/2024    MONOSABS 0.59 01/10/2024    EOSABS 0.14 01/10/2024    BASOSABS 0.03 01/10/2024    RBC 2.87 (L) 01/10/2024     (H) 01/10/2024    MCHC 31.2 (L) 01/10/2024    HGB 9.4 (L) 01/10/2024    HCT 30.1 (L) 01/10/2024    PLT 76 (L) 01/10/2024     Lab Results   Component Value Date    CREATININE 5.58 (H) 01/10/2024    BUN 46 (H) 01/10/2024    EGFR 8 (L) 01/10/2024     01/10/2024    K 3.7 01/10/2024     (H) 01/10/2024    CO2 18 (L) 01/10/2024      Lab Results   Component Value Date    ALT 19 01/10/2024    AST 20 01/10/2024    ALKPHOS 67 01/10/2024    BILITOT 0.6 01/10/2024      Lab Results   Component Value Date    IRON 81 01/10/2024    TIBC 261 01/10/2024    FERRITIN 557 (H) 01/10/2024      Lab Results   Component Value Date     01/10/2024     Office Visit on 01/10/2024   Component Date Value Ref Range Status    WBC 01/10/2024 9.5  4.4 - 11.3 x10*3/uL Final    nRBC 01/10/2024    Final    Not Measured    RBC  01/10/2024 2.87 (L)  4.00 - 5.20 x10*6/uL Final    Hemoglobin 01/10/2024 9.4 (L)  12.0 - 16.0 g/dL Final    Hematocrit 01/10/2024 30.1 (L)  36.0 - 46.0 % Final    MCV 01/10/2024 105 (H)  80 - 100 fL Final    MCH 01/10/2024 32.8  26.0 - 34.0 pg Final    MCHC 01/10/2024 31.2 (L)  32.0 - 36.0 g/dL Final    RDW 01/10/2024 13.5  11.5 - 14.5 % Final    Platelets 01/10/2024 89 (L)  150 - 450 x10*3/uL Final    Platelet count verified by smear review.    Neutrophils % 01/10/2024 80.7  40.0 - 80.0 % Final    Immature Granulocytes %, Automated 01/10/2024 0.1  0.0 - 0.9 % Final    Immature Granulocyte Count (IG) includes promyelocytes, myelocytes and metamyelocytes but does not include bands. Percent differential counts (%) should be interpreted in the context of the absolute cell counts (cells/UL).    Lymphocytes % 01/10/2024 11.2  13.0 - 44.0 % Final    Monocytes % 01/10/2024 6.2  2.0 - 10.0 % Final    Eosinophils % 01/10/2024 1.5  0.0 - 6.0 % Final    Basophils % 01/10/2024 0.3  0.0 - 2.0 % Final    Neutrophils Absolute 01/10/2024 7.68  1.20 - 7.70 x10*3/uL Final    Percent differential counts (%) should be interpreted in the context of the absolute cell counts (cells/uL).    Immature Granulocytes Absolute, Au* 01/10/2024 0.01  0.00 - 0.70 x10*3/uL Final    Lymphocytes Absolute 01/10/2024 1.07 (L)  1.20 - 4.80 x10*3/uL Final    Monocytes Absolute 01/10/2024 0.59  0.10 - 1.00 x10*3/uL Final    Eosinophils Absolute 01/10/2024 0.14  0.00 - 0.70 x10*3/uL Final    Basophils Absolute 01/10/2024 0.03  0.00 - 0.10 x10*3/uL Final    Glucose 01/10/2024 212 (H)  74 - 99 mg/dL Final    Sodium 01/10/2024 138  136 - 145 mmol/L Final    Potassium 01/10/2024 3.7  3.5 - 5.3 mmol/L Final    Chloride 01/10/2024 110 (H)  98 - 107 mmol/L Final    Bicarbonate 01/10/2024 18 (L)  21 - 32 mmol/L Final    Anion Gap 01/10/2024 14  10 - 20 mmol/L Final    Urea Nitrogen 01/10/2024 46 (H)  6 - 23 mg/dL Final    Creatinine 01/10/2024 5.58 (H)  0.50 - 1.05  mg/dL Final    eGFR 01/10/2024 8 (L)  >60 mL/min/1.73m*2 Final    Calculations of estimated GFR are performed using the 2021 CKD-EPI Study Refit equation without the race variable for the IDMS-Traceable creatinine methods.  https://jasn.asnjournals.org/content/early/2021/09/22/ASN.7409347232    Calcium 01/10/2024 8.9  8.6 - 10.3 mg/dL Final    Albumin 01/10/2024 3.9  3.4 - 5.0 g/dL Final    Alkaline Phosphatase 01/10/2024 67  33 - 136 U/L Final    Total Protein 01/10/2024 7.0  6.4 - 8.2 g/dL Final    AST 01/10/2024 20  9 - 39 U/L Final    Bilirubin, Total 01/10/2024 0.6  0.0 - 1.2 mg/dL Final    ALT 01/10/2024 19  7 - 45 U/L Final    Patients treated with Sulfasalazine may generate falsely decreased results for ALT.    Vitamin B12 01/10/2024 311  211 - 911 pg/mL Final    Iron 01/10/2024 81  35 - 150 ug/dL Final    UIBC 01/10/2024 180  110 - 370 ug/dL Final    TIBC 01/10/2024 261  240 - 445 ug/dL Final    % Saturation 01/10/2024 31  25 - 45 % Final    LDH 01/10/2024 142  84 - 246 U/L Final    Ferritin 01/10/2024 557 (H)  8 - 150 ng/mL Final    Erythropoietin 01/10/2024 15  4 - 27 mU/mL Final    Comment: INTERPRETIVE INFORMATION: Erythropoietin  Normal serum concentrations of erythropoietin for 95% of   individuals with normal hematocrits range from 4-27 mU/mL.    As the hematocrit is lowered by iron deficiency, aplastic, or   hemolytic anemia, the concentration of erythropoietin increases as   shown in the graph below. In the absence of anemia, elevated   concentrations are seen in renal tumors, as a manifestation of   renal transplant rejection, and in secondary polycythemia. Low   values may be observed in hemochromatosis.          Expected Erythropoietin Concentrations in Patients                     with Uncomplicated Anemia       Erythropoietin (mU/mL)                100,000 - +                          +                 10,000 - +.......                          + .......                  1,000 - +    .......                           +     ........                    100 - +       ........                          +        ........                     10 - +          ........                                                     +---+---+---+---+---+---+                         10   20  30  40  50  60  70                                (Hematocrit %)              (Contributions To Nephrology 1988:66:54-62)    Decreased erythropoietin concentrations with an elevated   hematocrit are observed in patients with polycythemia rubra vera,   and with a decreased hematocrit in patients with HIV infection who   are receiving AZT.  Patients on AZT who have anemia and   erythropoietin concentrations of less than or equal to 500 mU/mL   may benefit from therapy with recombinant EPO ([facility]   [id number],1990).  Performed By: [facility]  [address] [place] [address]  : Rahat Guajardo MD, PhD  CLIA Number: [id number]    Hepatitis B Core AB- Total 01/10/2024 Nonreactive  Nonreactive Final    Hepatitis C RNA PCR Log 01/10/2024    Final    Not calculated    Hepatitis C PCR with Genotype Refl* 01/10/2024 HCV viral load not detected, genotyping will not be performed.   Final    HCV RNA Result 01/10/2024 Not Detected  Not detected Final    Hepatitis B Surface AB 01/10/2024 4.2  <10.0 mIU/mL Final    Interpretive Criteria:                         <10 mIU/mL    Nonreactive                          >=10 mIU/mL    Reactive     Biotin interference may cause falsely decreased results. Patients taking a Biotin dose of up to 5 mg/day should refrain from taking Biotin for 24 hours before sample collection. Providers may contact their local laboratory for further information.    Hepatitis B Surface AG 01/10/2024 Nonreactive  Nonreactive Final    Biotin interference may cause falsely decreased results. Patients taking a Biotin dose of up to 5 mg/day should refrain from taking Biotin for 24 hours before sample  collection. Providers may contact their local laboratory for  further information.    HIV 1/2 Antigen/Antibody Screen wi* 01/10/2024 Nonreactive  Nonreactive Final    IgG 01/10/2024 1,750 (H)  700 - 1,600 mg/dL Final    IgA 01/10/2024 355  70 - 400 mg/dL Final    IgM 01/10/2024 42  40 - 230 mg/dL Final    Ig Kappa Free Light Chain 01/10/2024 19.36 (H)  0.33 - 1.94 mg/dL Final    Ig Lambda Free Light Chain 01/10/2024 8.98 (H)  0.57 - 2.63 mg/dL Final    Kappa/Lambda Ratio 01/10/2024 2.16 (H)  0.26 - 1.65 Final    Total Protein 01/10/2024 7.1  6.4 - 8.2 g/dL Final    Albumin 01/10/2024 3.9  3.4 - 5.0 g/dL Final    Alpha 1 Globulin 01/10/2024 0.3  0.2 - 0.6 g/dL Final    Alpha 2 Globulin 01/10/2024 0.6  0.4 - 1.1 g/dL Final    Beta Globulin 01/10/2024 0.8  0.5 - 1.2 g/dL Final    Gamma 01/10/2024 1.5 (H)  0.5 - 1.4 g/dL Final    Protein Electrophoresis Comment 01/10/2024 Increase in polyclonal gamma globulins.     Final    Immunofixation Comment 01/10/2024 No monoclonal protein detected by immunofixation.   Final    Path Review - Serum Protein Electr* 01/10/2024 Reviewed and approved by MAYRA CANELA on 1/12/24 at 5:10 PM.       Final    Path Review - Serum Immunofixation 01/10/2024 Reviewed and approved by MAYRA CANELA on 1/12/24 at 5:10 PM.       Final    Case Report 01/10/2024    Final                    Value:Flow Cytometry                                    Case: [id number]                                   Authorizing Provider:  Karina Mendoza PA-C          Collected:           01/10/2024 1416              Ordering Location:     [facility] [place]  Received:            01/10/2024 1416                                     Center                                                                       Pathologist:           Cliff Robledo MD                                                        Specimen:                                                                                                Diagnosis 01/10/2024    Final                    Value:This result contains rich text formatting which cannot be displayed here.      01/10/2024    Final                    Value:This result contains rich text formatting which cannot be displayed here.    Flow Differential 01/10/2024    Final                    Value:This result contains rich text formatting which cannot be displayed here.    Flow Test Ordered 01/10/2024 Acute Panel  not established Final    Specimen Viability 01/10/2024 Acceptable  not established Final    Cell Count 01/10/2024 9.50  not established x10*3/uL Final    Number of Cells Collected 01/10/2024 100,000.00  not established per tube Final    Cytogenetics/Molecular Test Ordered 01/10/2024 No  not established Final    Red Cells 01/10/2024 Abnormal  not established Final    This result contains rich text formatting which cannot be displayed here.    White Cells 01/10/2024 Normal  not established Final    Methodology 01/10/2024    Final                    Value:This result contains rich text formatting which cannot be displayed here.    Protime 01/10/2024 12.8  9.8 - 12.8 seconds Final    INR 01/10/2024 1.1  0.9 - 1.1 Final    aPTT 01/10/2024 34  27 - 38 seconds Final    Fibrinogen 01/10/2024 345  200 - 400 mg/dL Final    Platelets 01/10/2024 76 (L)  150 - 450 x10*3/uL Final    RBC Morphology 01/10/2024 No significant RBC morphology present   Final    Cytogenetics Interpretation 01/10/2024    Final                    Value:This result contains rich text formatting which cannot be displayed here.    Electronically signed and reported* 01/10/2024    Final                    Value:This result contains rich text formatting which cannot be displayed here.    Myeloid Malignancies Results 01/10/2024    Final                    Value:This result contains rich text formatting which cannot be displayed here.    Electronically signed and reported* 01/10/2024    Final                    Value:Leonie  MD Alden PhD    Lab on 11/10/2023   Component Date Value Ref Range Status    WBC 11/10/2023 6.4  4.4 - 11.3 x10*3/uL Final    nRBC 11/10/2023 0.0  0.0 - 0.0 /100 WBCs Final    RBC 11/10/2023 3.06 (L)  4.00 - 5.20 x10*6/uL Final    Hemoglobin 11/10/2023 10.0 (L)  12.0 - 16.0 g/dL Final    Hematocrit 11/10/2023 30.0 (L)  36.0 - 46.0 % Final    MCV 11/10/2023 98  80 - 100 fL Final    MCH 11/10/2023 32.7  26.0 - 34.0 pg Final    MCHC 11/10/2023 33.3  32.0 - 36.0 g/dL Final    RDW 11/10/2023 13.0  11.5 - 14.5 % Final    Platelets 11/10/2023 66 (L)  150 - 450 x10*3/uL Final   Lab Requisition on 10/24/2023   Component Date Value Ref Range Status    HLA Results 10/24/2023 See Attached   Final       Assessment:    58 y.o. female w/PMH s/f polycystic kidney disease referred for thrombocytopenia prior to being placed on kidney transplant list.       Plan:    Reviewed and discussed lab, imaging, and pathology results with patient in detail as well as diagnosis, prognosis, and treatment options.    Discussed sending work up to r/o platelet clumping, nutritional, viral etiologies. PB flow normal.    Will send for Bmbx for definitive diagnosis.    F/U w/PCP and renal    RTC in 6 weeks    Patient verbalized understanding, and all her questions were answered to her satisfaction    30 min spent with patient greater than 50 % of which was spent in consultation and coordination of care via telehealth.

## 2024-02-27 RX ORDER — ONDANSETRON HYDROCHLORIDE 2 MG/ML
4 INJECTION, SOLUTION INTRAVENOUS EVERY 6 HOURS PRN
Status: CANCELLED | OUTPATIENT
Start: 2024-02-27

## 2024-02-27 RX ORDER — HEPARIN 100 UNIT/ML
500 SYRINGE INTRAVENOUS ONCE AS NEEDED
Status: CANCELLED | OUTPATIENT
Start: 2024-02-27

## 2024-02-28 ENCOUNTER — HOSPITAL ENCOUNTER (OUTPATIENT)
Dept: RADIOLOGY | Facility: HOSPITAL | Age: 59
Discharge: HOME | End: 2024-02-28
Payer: MEDICAID

## 2024-02-28 VITALS
WEIGHT: 160 LBS | SYSTOLIC BLOOD PRESSURE: 180 MMHG | BODY MASS INDEX: 29.07 KG/M2 | TEMPERATURE: 98.3 F | DIASTOLIC BLOOD PRESSURE: 89 MMHG | HEART RATE: 72 BPM | OXYGEN SATURATION: 99 % | RESPIRATION RATE: 16 BRPM

## 2024-02-28 DIAGNOSIS — D69.6 THROMBOCYTOPENIA (CMS-HCC): ICD-10-CM

## 2024-02-28 DIAGNOSIS — C91.10 CLL (CHRONIC LYMPHOCYTIC LEUKEMIA) (MULTI): ICD-10-CM

## 2024-02-28 LAB
BASOPHILS # BLD AUTO: 0.03 X10*3/UL (ref 0–0.1)
BASOPHILS NFR BLD AUTO: 0.5 %
EOSINOPHIL # BLD AUTO: 0.12 X10*3/UL (ref 0–0.7)
EOSINOPHIL NFR BLD AUTO: 1.9 %
ERYTHROCYTE [DISTWIDTH] IN BLOOD BY AUTOMATED COUNT: 13.2 % (ref 11.5–14.5)
ERYTHROCYTE [DISTWIDTH] IN BLOOD BY AUTOMATED COUNT: 13.2 % (ref 11.5–14.5)
HCT VFR BLD AUTO: 27 % (ref 36–46)
HCT VFR BLD AUTO: 27 % (ref 36–46)
HGB BLD-MCNC: 8.8 G/DL (ref 12–16)
HGB BLD-MCNC: 8.8 G/DL (ref 12–16)
IMM GRANULOCYTES # BLD AUTO: 0.05 X10*3/UL (ref 0–0.7)
IMM GRANULOCYTES NFR BLD AUTO: 0.8 % (ref 0–0.9)
LYMPHOCYTES # BLD AUTO: 1.05 X10*3/UL (ref 1.2–4.8)
LYMPHOCYTES NFR BLD AUTO: 16.5 %
MCH RBC QN AUTO: 32.6 PG (ref 26–34)
MCH RBC QN AUTO: 32.6 PG (ref 26–34)
MCHC RBC AUTO-ENTMCNC: 32.6 G/DL (ref 32–36)
MCHC RBC AUTO-ENTMCNC: 32.6 G/DL (ref 32–36)
MCV RBC AUTO: 100 FL (ref 80–100)
MCV RBC AUTO: 100 FL (ref 80–100)
MONOCYTES # BLD AUTO: 0.48 X10*3/UL (ref 0.1–1)
MONOCYTES NFR BLD AUTO: 7.5 %
NEUTROPHILS # BLD AUTO: 4.63 X10*3/UL (ref 1.2–7.7)
NEUTROPHILS NFR BLD AUTO: 72.8 %
NRBC BLD-RTO: 0 /100 WBCS (ref 0–0)
NRBC BLD-RTO: 0 /100 WBCS (ref 0–0)
PLATELET # BLD AUTO: 52 X10*3/UL (ref 150–450)
PLATELET # BLD AUTO: 52 X10*3/UL (ref 150–450)
RBC # BLD AUTO: 2.7 X10*6/UL (ref 4–5.2)
RBC # BLD AUTO: 2.7 X10*6/UL (ref 4–5.2)
RBC MORPH BLD: NORMAL
WBC # BLD AUTO: 6.3 X10*3/UL (ref 4.4–11.3)
WBC # BLD AUTO: 6.3 X10*3/UL (ref 4.4–11.3)

## 2024-02-28 PROCEDURE — 2500000005 HC RX 250 GENERAL PHARMACY W/O HCPCS: Performed by: RADIOLOGY

## 2024-02-28 PROCEDURE — 36415 COLL VENOUS BLD VENIPUNCTURE: CPT | Performed by: NURSE PRACTITIONER

## 2024-02-28 PROCEDURE — 88291 CYTO/MOLECULAR REPORT: CPT | Performed by: PATHOLOGY

## 2024-02-28 PROCEDURE — 85027 COMPLETE CBC AUTOMATED: CPT | Performed by: NURSE PRACTITIONER

## 2024-02-28 PROCEDURE — 85097 BONE MARROW INTERPRETATION: CPT | Mod: TC,PORLAB | Performed by: PHYSICIAN ASSISTANT

## 2024-02-28 PROCEDURE — 2720000007 HC OR 272 NO HCPCS

## 2024-02-28 PROCEDURE — 99221 1ST HOSP IP/OBS SF/LOW 40: CPT | Performed by: NURSE PRACTITIONER

## 2024-02-28 PROCEDURE — 77012 CT SCAN FOR NEEDLE BIOPSY: CPT | Performed by: RADIOLOGY

## 2024-02-28 PROCEDURE — 99152 MOD SED SAME PHYS/QHP 5/>YRS: CPT | Performed by: RADIOLOGY

## 2024-02-28 PROCEDURE — 2500000004 HC RX 250 GENERAL PHARMACY W/ HCPCS (ALT 636 FOR OP/ED): Performed by: RADIOLOGY

## 2024-02-28 PROCEDURE — 88264 CHROMOSOME ANALYSIS 20-25: CPT | Performed by: PHYSICIAN ASSISTANT

## 2024-02-28 PROCEDURE — 2500000004 HC RX 250 GENERAL PHARMACY W/ HCPCS (ALT 636 FOR OP/ED): Performed by: NURSE PRACTITIONER

## 2024-02-28 PROCEDURE — 77012 CT SCAN FOR NEEDLE BIOPSY: CPT

## 2024-02-28 PROCEDURE — 88237 TISSUE CULTURE BONE MARROW: CPT | Performed by: PHYSICIAN ASSISTANT

## 2024-02-28 PROCEDURE — 38222 DX BONE MARROW BX & ASPIR: CPT | Performed by: RADIOLOGY

## 2024-02-28 PROCEDURE — 81273 KIT GENE ANALYS D816 VARIANT: CPT | Performed by: PHYSICIAN ASSISTANT

## 2024-02-28 PROCEDURE — G0452 MOLECULAR PATHOLOGY INTERPR: HCPCS | Performed by: PATHOLOGY

## 2024-02-28 RX ORDER — MIDAZOLAM HYDROCHLORIDE 1 MG/ML
INJECTION, SOLUTION INTRAMUSCULAR; INTRAVENOUS
Status: COMPLETED | OUTPATIENT
Start: 2024-02-28 | End: 2024-02-28

## 2024-02-28 RX ORDER — ONDANSETRON 4 MG/1
4 TABLET, ORALLY DISINTEGRATING ORAL EVERY 8 HOURS PRN
Status: DISCONTINUED | OUTPATIENT
Start: 2024-02-28 | End: 2024-02-29 | Stop reason: HOSPADM

## 2024-02-28 RX ORDER — FENTANYL CITRATE 50 UG/ML
INJECTION, SOLUTION INTRAMUSCULAR; INTRAVENOUS
Status: COMPLETED | OUTPATIENT
Start: 2024-02-28 | End: 2024-02-28

## 2024-02-28 RX ORDER — LIDOCAINE HYDROCHLORIDE 20 MG/ML
INJECTION, SOLUTION EPIDURAL; INFILTRATION; INTRACAUDAL; PERINEURAL
Status: COMPLETED | OUTPATIENT
Start: 2024-02-28 | End: 2024-02-28

## 2024-02-28 RX ORDER — ONDANSETRON HYDROCHLORIDE 2 MG/ML
4 INJECTION, SOLUTION INTRAVENOUS EVERY 8 HOURS PRN
Status: DISCONTINUED | OUTPATIENT
Start: 2024-02-28 | End: 2024-02-29 | Stop reason: HOSPADM

## 2024-02-28 RX ADMIN — ONDANSETRON 4 MG: 2 INJECTION INTRAMUSCULAR; INTRAVENOUS at 10:35

## 2024-02-28 RX ADMIN — LIDOCAINE HYDROCHLORIDE 10 ML: 20 INJECTION, SOLUTION EPIDURAL; INFILTRATION; INTRACAUDAL; PERINEURAL at 09:05

## 2024-02-28 RX ADMIN — FENTANYL CITRATE 100 MCG: 50 INJECTION INTRAMUSCULAR; INTRAVENOUS at 09:01

## 2024-02-28 RX ADMIN — MIDAZOLAM 1 MG: 1 INJECTION INTRAMUSCULAR; INTRAVENOUS at 09:01

## 2024-02-28 ASSESSMENT — PAIN - FUNCTIONAL ASSESSMENT
PAIN_FUNCTIONAL_ASSESSMENT: 0-10

## 2024-02-28 ASSESSMENT — PAIN SCALES - GENERAL
PAINLEVEL_OUTOF10: 0 - NO PAIN

## 2024-02-28 ASSESSMENT — ENCOUNTER SYMPTOMS
CARDIOVASCULAR NEGATIVE: 1
ARTHRALGIAS: 1
RESPIRATORY NEGATIVE: 1
NEUROLOGICAL NEGATIVE: 1
PSYCHIATRIC NEGATIVE: 1
CONSTITUTIONAL NEGATIVE: 1

## 2024-02-28 NOTE — POST-PROCEDURE NOTE
Interventional Radiology Brief Postprocedure Note    Attending: Jadon Reese MD      Assistant: none    Diagnosis: CLL    Description of procedure: bone marrow biopsy     Anesthesia:  Local, mod sed    Complications: None    Estimated Blood Loss: minimal      specimens collected      See detailed result report with images in PACS.    The patient tolerated the procedure well without incident or complication and is in stable condition.

## 2024-02-28 NOTE — H&P
History Of Present Illness  Luis Panda is a 58 y.o. female presenting with thrombocytopenia and referred for bone marrow biopsy. She has a past medical history of PKD, DMII, HTN, HL, fistula left arm. She is accompanied by family. The patient only complaint today is some right shoulder pain.      Past Medical History  Past Medical History:   Diagnosis Date    CKD (chronic kidney disease)     Diabetes mellitus (CMS/HCC)     HLD (hyperlipidemia)     Hypertension        Surgical History  Past Surgical History:   Procedure Laterality Date    AV FISTULA PLACEMENT Left     approx 2019    MR HEAD ANGIO WO IV CONTRAST  08/07/2023    MR HEAD ANGIO WO IV CONTRAST 8/7/2023 [facility] [id number] MRI        Social History  She reports that she quit smoking about 39 years ago. Her smoking use included cigarettes. She has never been exposed to tobacco smoke. She has never used smokeless tobacco. She reports that she does not currently use alcohol. She reports that she does not use drugs.    Family History  No family history on file.     Allergies  Amlodipine and Oxycodone    Review of Systems   Constitutional: Negative.    Respiratory: Negative.     Cardiovascular: Negative.    Musculoskeletal:  Positive for arthralgias.   Neurological: Negative.    Psychiatric/Behavioral: Negative.          Physical Exam  HENT:      Mouth/Throat:      Mouth: Mucous membranes are moist.   Cardiovascular:      Rate and Rhythm: Normal rate and regular rhythm.      Heart sounds: No murmur heard.     No friction rub. No gallop.   Pulmonary:      Effort: Pulmonary effort is normal.      Breath sounds: Normal breath sounds.   Skin:     General: Skin is warm and dry.   Neurological:      General: No focal deficit present.      Mental Status: She is oriented to person, place, and time.          Last Recorded Vitals  Blood pressure (!) 190/89, pulse 87, temperature 36.8 °C (98.3 °F), temperature source Temporal, resp. rate 16, weight 72.6 kg (160 lb), SpO2 100  %.    Relevant Results    Collecting pre CBC     Assessment/Plan   Thrombocytopenia     Plan: bone marrow biopsy        I spent 35 minutes in the professional and overall care of this patient.      Melissa Lezama, APRN-CNP

## 2024-02-28 NOTE — PRE-SEDATION DOCUMENTATION
Sedation Plan    ASA 2     Mallampati class: IV.    Risks, benefits, and alternatives discussed with patient.    Moderate sedation

## 2024-02-28 NOTE — DISCHARGE INSTRUCTIONS
If you have any questions, please call the Interventional Radiology Nurse Practitioner Kiah Lezama at [phone] and leave a message. She will return your call the same day if calling before 3 PM M-F. If you call on the weekend you can expect a call back on Monday morning. You may also call the Cath Lab at [phone] M-F, 7-3:30 with any questions. Weekends and after hours please call your referring provider's office number to reach a physician on call.    Okay to restart aspirin on 2/29/24 from our standpoint.     Remove the dressing from the back hip on Thursday, 2/29/24. Clean the area with soap and water, rinse and dry.     Your referring provider will have the results of the biopsy. Please contact them for results or keep your scheduled follow up.

## 2024-02-28 NOTE — NURSING NOTE
Patient ambulated without difficulty, voided. No further complaints. IV site d/c'd, discharge instructions reviewed with patient and family.

## 2024-02-29 LAB
CELL COUNT (BLOOD): 37.22 X10*3/UL
CELL POPULATIONS: NORMAL
DIAGNOSIS: NORMAL
FLOW DIFFERENTIAL: NORMAL
FLOW TEST ORDERED: NORMAL
LAB TEST METHOD: NORMAL
NUMBER OF CELLS COLLECTED: NORMAL
PATH REPORT.TOTAL CANCER: NORMAL
SIGNATURE COMMENT: NORMAL
SPECIMEN VIABILITY: NORMAL

## 2024-02-29 PROCEDURE — 88185 FLOWCYTOMETRY/TC ADD-ON: CPT | Mod: TC,PORLAB | Performed by: PHYSICIAN ASSISTANT

## 2024-02-29 PROCEDURE — 88189 FLOWCYTOMETRY/READ 16 & >: CPT | Performed by: PATHOLOGY

## 2024-03-01 PROCEDURE — 88341 IMHCHEM/IMCYTCHM EA ADD ANTB: CPT | Performed by: PATHOLOGY

## 2024-03-01 PROCEDURE — 88342 IMHCHEM/IMCYTCHM 1ST ANTB: CPT | Performed by: PATHOLOGY

## 2024-03-01 PROCEDURE — 88305 TISSUE EXAM BY PATHOLOGIST: CPT | Mod: TC,SUR,PORLAB | Performed by: PHYSICIAN ASSISTANT

## 2024-03-01 PROCEDURE — 88305 TISSUE EXAM BY PATHOLOGIST: CPT | Performed by: PATHOLOGY

## 2024-03-01 PROCEDURE — 88313 SPECIAL STAINS GROUP 2: CPT | Performed by: PATHOLOGY

## 2024-03-04 LAB
ELECTRONICALLY SIGNED BY: NORMAL
KIT D816V INTERPRETATION: NORMAL
KIT D816V MUTATION RESULTS: NOT DETECTED

## 2024-03-05 ENCOUNTER — TELEMEDICINE (OUTPATIENT)
Dept: HEMATOLOGY/ONCOLOGY | Facility: CLINIC | Age: 59
End: 2024-03-05
Payer: MEDICAID

## 2024-03-05 DIAGNOSIS — D69.6 THROMBOCYTOPENIA (CMS-HCC): ICD-10-CM

## 2024-03-05 PROCEDURE — 99214 OFFICE O/P EST MOD 30 MIN: CPT | Performed by: PHYSICIAN ASSISTANT

## 2024-03-05 PROCEDURE — 1036F TOBACCO NON-USER: CPT | Performed by: PHYSICIAN ASSISTANT

## 2024-03-06 NOTE — PROGRESS NOTES
Visit Type: Benign Heme Follow-up, Telephone Visit:   A telephone visit (audio only) between the patient (at the originating site) and the provider (at the distant site) was utilized to provide this telehealth service.   Verbal Consent for Encounter: Verbal consent was  requested and obtained from patient, or from parent/guardian if minor, on this date for a telehealth visit.     Reason for Visit  Luis Panda is a 58 y.o. female w/PMH s/f polycystic kidney disease referred by Dr. Luciano for thrombocytopenia prior to being  placed on kidney transplant list.     Upon review of labs, noted to have thrombocytopenia since 2021 in the 's. Normocytic-macrocytic anemia. Normal WBC.    Patient presents with son and . Translation done by son. On assessment, reports long standing h/o constipation and chronic shoulder pain otherwise feeling well. Denies bleeding or bruising.     PMH/PSH: PKD, DMII, HTN, HL, fistula left arm.  FH: Denies FH of cancers, bleeding or clotting disorder.   Soc Hx: former smoker, denies ETOH, illicit drugs; , 4 children.    History of Present Illness:  Spoke to son. Patient doing well.    Review of Systems: All of the systems have been reviewed and are negative for complaints except what is stated in the HPI and/or past medical history.    Allergies and Intolerances:  Allergies   Allergen Reactions    Amlodipine Unknown     LEG SWELLING    Oxycodone Unknown     Dizziness, weakness            Current Outpatient Medications   Medication Sig Dispense Refill    aspirin 81 mg EC tablet Take 1 tablet (81 mg) by mouth once daily.      calcitriol (Rocaltrol) 0.25 mcg capsule Take 1 capsule (0.25 mcg) by mouth once daily.      carvedilol (Coreg) 25 mg tablet Take 1 tablet (25 mg) by mouth 2 times a day with meals.      cholecalciferol (Vitamin D-3) 50 MCG (2000 UT) tablet Take 1 tablet (50 mcg) by mouth once daily.      ferrous sulfate, 325 mg ferrous sulfate, tablet Take 1 tablet  by mouth 3 times a day.      glipiZIDE (Glucotrol) 5 mg tablet Take 1 tablet (5 mg) by mouth once daily.      hydrALAZINE (Apresoline) 25 mg tablet Take 1 tablet (25 mg) by mouth 3 times a day.      omeprazole (PriLOSEC) 40 mg DR capsule Take 1 capsule (40 mg) by mouth once daily.      ondansetron ODT (Zofran-ODT) 4 mg disintegrating tablet 1 tablet (4 mg) every 6 hours.      simvastatin (Zocor) 20 mg tablet Take 1 tablet (20 mg) by mouth once daily.      sodium bicarbonate 650 mg tablet Take 1 tablet (650 mg) by mouth.      traZODone (Desyrel) 50 mg tablet Take 1 tablet (50 mg) by mouth as needed at bedtime for sleep.      TRUEplus Lancets 33 gauge misc        No current facility-administered medications for this visit.        Vitals and Measurements:   There were no vitals taken for this visit.       Physical Exam:   Deferred due to telehealth     Lab Results   Component Value Date    WBC 6.3 02/28/2024    WBC 6.3 02/28/2024    NEUTROABS 4.63 02/28/2024    IGABSOL 0.05 02/28/2024    LYMPHSABS 1.05 (L) 02/28/2024    MONOSABS 0.48 02/28/2024    EOSABS 0.12 02/28/2024    BASOSABS 0.03 02/28/2024    RBC 2.70 (L) 02/28/2024    RBC 2.70 (L) 02/28/2024     02/28/2024     02/28/2024    MCHC 32.6 02/28/2024    MCHC 32.6 02/28/2024    HGB 8.8 (L) 02/28/2024    HGB 8.8 (L) 02/28/2024    HCT 27.0 (L) 02/28/2024    HCT 27.0 (L) 02/28/2024    PLT 52 (L) 02/28/2024    PLT 52 (L) 02/28/2024     Lab Results   Component Value Date    CREATININE 5.58 (H) 01/10/2024    BUN 46 (H) 01/10/2024    EGFR 8 (L) 01/10/2024     01/10/2024    K 3.7 01/10/2024     (H) 01/10/2024    CO2 18 (L) 01/10/2024      Lab Results   Component Value Date    ALT 19 01/10/2024    AST 20 01/10/2024    ALKPHOS 67 01/10/2024    BILITOT 0.6 01/10/2024      Lab Results   Component Value Date    IRON 81 01/10/2024    TIBC 261 01/10/2024    FERRITIN 557 (H) 01/10/2024      Lab Results   Component Value Date     01/10/2024      Hospital Outpatient Visit on 02/28/2024   Component Date Value Ref Range Status    WBC 02/28/2024 6.3  4.4 - 11.3 x10*3/uL Final    nRBC 02/28/2024 0.0  0.0 - 0.0 /100 WBCs Final    RBC 02/28/2024 2.70 (L)  4.00 - 5.20 x10*6/uL Final    Hemoglobin 02/28/2024 8.8 (L)  12.0 - 16.0 g/dL Final    Hematocrit 02/28/2024 27.0 (L)  36.0 - 46.0 % Final    MCV 02/28/2024 100  80 - 100 fL Final    MCH 02/28/2024 32.6  26.0 - 34.0 pg Final    MCHC 02/28/2024 32.6  32.0 - 36.0 g/dL Final    RDW 02/28/2024 13.2  11.5 - 14.5 % Final    Platelets 02/28/2024 52 (L)  150 - 450 x10*3/uL Final   Telemedicine on 01/23/2024   Component Date Value Ref Range Status    Case Report 02/28/2024    Final                    Value:Surgical Pathology                                Case: [id number]                                  Authorizing Provider:  Karina Mendoza PA-C          Collected:           02/28/2024 0926              Ordering Location:     [facility]  Received:            02/28/2024 0927                                     Center                                                                       Pathologist:           Carlos Yu MD                                                         Specimens:   A) - BONE MARROW CLOT, LEFT ILIAC CREST                                                             B) - BONE MARROW CORE BIOPSY, LEFT ILIAC CREST                                                      C) - BONE MARROW ASPIRATE, LEFT ILIAC CREST                                                FINAL DIAGNOSIS 02/28/2024    Final                    Value:This result contains rich text formatting which cannot be displayed here.    Bone Marrow Differential 02/28/2024    Final                    Value:This result contains rich text formatting which cannot be displayed here.    Microscopic Description 02/28/2024    Final                    Value:This result contains rich text formatting which cannot be displayed here.     Gross Description 02/28/2024    Final                    Value:This result contains rich text formatting which cannot be displayed here.    Case Report 02/28/2024    Final                    Value:Flow Cytometry                                    Case: [id number]                                   Authorizing Provider:  Karina Mendoza PA-C          Collected:           02/28/2024 0926              Ordering Location:     [facility]  Received:            02/28/2024 0927                                     Center                                                                       Pathologist:           Carlos Yu MD                                                         Specimen:    Bone Marrow Aspirate                                                                       Diagnosis 02/28/2024    Final                    Value:This result contains rich text formatting which cannot be displayed here.      02/28/2024    Final                    Value:This result contains rich text formatting which cannot be displayed here.    Flow Differential 02/28/2024    Final                    Value:This result contains rich text formatting which cannot be displayed here.    Flow Test Ordered 02/28/2024 Acute Panel  not established Final    Specimen Viability 02/28/2024 Acceptable  not established Final    Cell Count 02/28/2024 37.22  not established x10*3/uL Final    Number of Cells Collected 02/28/2024    Final    This result contains rich text formatting which cannot be displayed here.    Methodology 02/28/2024    Final                    Value:This result contains rich text formatting which cannot be displayed here.    WBC 02/28/2024 6.3  4.4 - 11.3 x10*3/uL Final    nRBC 02/28/2024 0.0  0.0 - 0.0 /100 WBCs Final    RBC 02/28/2024 2.70 (L)  4.00 - 5.20 x10*6/uL Final    Hemoglobin 02/28/2024 8.8 (L)  12.0 - 16.0 g/dL Final    Hematocrit 02/28/2024 27.0 (L)  36.0 - 46.0 % Final    MCV 02/28/2024 100  80 - 100  fL Final    MCH 02/28/2024 32.6  26.0 - 34.0 pg Final    MCHC 02/28/2024 32.6  32.0 - 36.0 g/dL Final    RDW 02/28/2024 13.2  11.5 - 14.5 % Final    Platelets 02/28/2024 52 (L)  150 - 450 x10*3/uL Final    Neutrophils % 02/28/2024 72.8  40.0 - 80.0 % Final    Immature Granulocytes %, Automated 02/28/2024 0.8  0.0 - 0.9 % Final    Immature Granulocyte Count (IG) includes promyelocytes, myelocytes and metamyelocytes but does not include bands. Percent differential counts (%) should be interpreted in the context of the absolute cell counts (cells/UL).    Lymphocytes % 02/28/2024 16.5  13.0 - 44.0 % Final    Monocytes % 02/28/2024 7.5  2.0 - 10.0 % Final    Eosinophils % 02/28/2024 1.9  0.0 - 6.0 % Final    Basophils % 02/28/2024 0.5  0.0 - 2.0 % Final    Neutrophils Absolute 02/28/2024 4.63  1.20 - 7.70 x10*3/uL Final    Percent differential counts (%) should be interpreted in the context of the absolute cell counts (cells/uL).    Immature Granulocytes Absolute, Au* 02/28/2024 0.05  0.00 - 0.70 x10*3/uL Final    Lymphocytes Absolute 02/28/2024 1.05 (L)  1.20 - 4.80 x10*3/uL Final    Monocytes Absolute 02/28/2024 0.48  0.10 - 1.00 x10*3/uL Final    Eosinophils Absolute 02/28/2024 0.12  0.00 - 0.70 x10*3/uL Final    Basophils Absolute 02/28/2024 0.03  0.00 - 0.10 x10*3/uL Final    RBC Morphology 02/28/2024 No significant RBC morphology present   Final    KIT D816V Mutation Results 02/28/2024 Not Detected  Not Detected Final    KIT D816V Interpretation 02/28/2024    Final                    Value:This result contains rich text formatting which cannot be displayed here.    Electronically signed and reported* 02/28/2024    Final                    Value:Elvira Stratton MD   Office Visit on 01/10/2024   Component Date Value Ref Range Status    WBC 01/10/2024 9.5  4.4 - 11.3 x10*3/uL Final    nRBC 01/10/2024    Final    Not Measured    RBC 01/10/2024 2.87 (L)  4.00 - 5.20 x10*6/uL Final    Hemoglobin 01/10/2024 9.4 (L)  12.0 -  16.0 g/dL Final    Hematocrit 01/10/2024 30.1 (L)  36.0 - 46.0 % Final    MCV 01/10/2024 105 (H)  80 - 100 fL Final    MCH 01/10/2024 32.8  26.0 - 34.0 pg Final    MCHC 01/10/2024 31.2 (L)  32.0 - 36.0 g/dL Final    RDW 01/10/2024 13.5  11.5 - 14.5 % Final    Platelets 01/10/2024 89 (L)  150 - 450 x10*3/uL Final    Platelet count verified by smear review.    Neutrophils % 01/10/2024 80.7  40.0 - 80.0 % Final    Immature Granulocytes %, Automated 01/10/2024 0.1  0.0 - 0.9 % Final    Immature Granulocyte Count (IG) includes promyelocytes, myelocytes and metamyelocytes but does not include bands. Percent differential counts (%) should be interpreted in the context of the absolute cell counts (cells/UL).    Lymphocytes % 01/10/2024 11.2  13.0 - 44.0 % Final    Monocytes % 01/10/2024 6.2  2.0 - 10.0 % Final    Eosinophils % 01/10/2024 1.5  0.0 - 6.0 % Final    Basophils % 01/10/2024 0.3  0.0 - 2.0 % Final    Neutrophils Absolute 01/10/2024 7.68  1.20 - 7.70 x10*3/uL Final    Percent differential counts (%) should be interpreted in the context of the absolute cell counts (cells/uL).    Immature Granulocytes Absolute, Au* 01/10/2024 0.01  0.00 - 0.70 x10*3/uL Final    Lymphocytes Absolute 01/10/2024 1.07 (L)  1.20 - 4.80 x10*3/uL Final    Monocytes Absolute 01/10/2024 0.59  0.10 - 1.00 x10*3/uL Final    Eosinophils Absolute 01/10/2024 0.14  0.00 - 0.70 x10*3/uL Final    Basophils Absolute 01/10/2024 0.03  0.00 - 0.10 x10*3/uL Final    Glucose 01/10/2024 212 (H)  74 - 99 mg/dL Final    Sodium 01/10/2024 138  136 - 145 mmol/L Final    Potassium 01/10/2024 3.7  3.5 - 5.3 mmol/L Final    Chloride 01/10/2024 110 (H)  98 - 107 mmol/L Final    Bicarbonate 01/10/2024 18 (L)  21 - 32 mmol/L Final    Anion Gap 01/10/2024 14  10 - 20 mmol/L Final    Urea Nitrogen 01/10/2024 46 (H)  6 - 23 mg/dL Final    Creatinine 01/10/2024 5.58 (H)  0.50 - 1.05 mg/dL Final    eGFR 01/10/2024 8 (L)  >60 mL/min/1.73m*2 Final    Calculations of  estimated GFR are performed using the 2021 CKD-EPI Study Refit equation without the race variable for the IDMS-Traceable creatinine methods.  https://jasn.asnjournals.org/content/early/2021/09/22/ASN.1525780688    Calcium 01/10/2024 8.9  8.6 - 10.3 mg/dL Final    Albumin 01/10/2024 3.9  3.4 - 5.0 g/dL Final    Alkaline Phosphatase 01/10/2024 67  33 - 136 U/L Final    Total Protein 01/10/2024 7.0  6.4 - 8.2 g/dL Final    AST 01/10/2024 20  9 - 39 U/L Final    Bilirubin, Total 01/10/2024 0.6  0.0 - 1.2 mg/dL Final    ALT 01/10/2024 19  7 - 45 U/L Final    Patients treated with Sulfasalazine may generate falsely decreased results for ALT.    Vitamin B12 01/10/2024 311  211 - 911 pg/mL Final    Iron 01/10/2024 81  35 - 150 ug/dL Final    UIBC 01/10/2024 180  110 - 370 ug/dL Final    TIBC 01/10/2024 261  240 - 445 ug/dL Final    % Saturation 01/10/2024 31  25 - 45 % Final    LDH 01/10/2024 142  84 - 246 U/L Final    Ferritin 01/10/2024 557 (H)  8 - 150 ng/mL Final    Erythropoietin 01/10/2024 15  4 - 27 mU/mL Final    Comment: INTERPRETIVE INFORMATION: Erythropoietin  Normal serum concentrations of erythropoietin for 95% of   individuals with normal hematocrits range from 4-27 mU/mL.    As the hematocrit is lowered by iron deficiency, aplastic, or   hemolytic anemia, the concentration of erythropoietin increases as   shown in the graph below. In the absence of anemia, elevated   concentrations are seen in renal tumors, as a manifestation of   renal transplant rejection, and in secondary polycythemia. Low   values may be observed in hemochromatosis.          Expected Erythropoietin Concentrations in Patients                     with Uncomplicated Anemia       Erythropoietin (mU/mL)                100,000 - +                          +                 10,000 - +.......                          + .......                  1,000 - +    .......                          +     ........                    100 - +       ........                           +        ........                     10 - +          ........                                                     +---+---+---+---+---+---+                         10   20  30  40  50  60  70                                (Hematocrit %)              (Contributions To Nephrology 1988:66:54-62)    Decreased erythropoietin concentrations with an elevated   hematocrit are observed in patients with polycythemia rubra vera,   and with a decreased hematocrit in patients with HIV infection who   are receiving AZT.  Patients on AZT who have anemia and   erythropoietin concentrations of less than or equal to 500 mU/mL   may benefit from therapy with recombinant EPO ([facility]   [id number]).  Performed By: [facility]  [address] [place] [address]  : Rahat Guajardo MD, PhD  CLIA Number: [id number]    Hepatitis B Core AB- Total 01/10/2024 Nonreactive  Nonreactive Final    Hepatitis C RNA PCR Log 01/10/2024    Final    Not calculated    Hepatitis C PCR with Genotype Refl* 01/10/2024 HCV viral load not detected, genotyping will not be performed.   Final    HCV RNA Result 01/10/2024 Not Detected  Not detected Final    Hepatitis B Surface AB 01/10/2024 4.2  <10.0 mIU/mL Final    Interpretive Criteria:                         <10 mIU/mL    Nonreactive                          >=10 mIU/mL    Reactive     Biotin interference may cause falsely decreased results. Patients taking a Biotin dose of up to 5 mg/day should refrain from taking Biotin for 24 hours before sample collection. Providers may contact their local laboratory for further information.    Hepatitis B Surface AG 01/10/2024 Nonreactive  Nonreactive Final    Biotin interference may cause falsely decreased results. Patients taking a Biotin dose of up to 5 mg/day should refrain from taking Biotin for 24 hours before sample collection. Providers may contact their local laboratory for  further information.     HIV 1/2 Antigen/Antibody Screen wi* 01/10/2024 Nonreactive  Nonreactive Final    IgG 01/10/2024 1,750 (H)  700 - 1,600 mg/dL Final    IgA 01/10/2024 355  70 - 400 mg/dL Final    IgM 01/10/2024 42  40 - 230 mg/dL Final    Ig Kappa Free Light Chain 01/10/2024 19.36 (H)  0.33 - 1.94 mg/dL Final    Ig Lambda Free Light Chain 01/10/2024 8.98 (H)  0.57 - 2.63 mg/dL Final    Kappa/Lambda Ratio 01/10/2024 2.16 (H)  0.26 - 1.65 Final    Total Protein 01/10/2024 7.1  6.4 - 8.2 g/dL Final    Albumin 01/10/2024 3.9  3.4 - 5.0 g/dL Final    Alpha 1 Globulin 01/10/2024 0.3  0.2 - 0.6 g/dL Final    Alpha 2 Globulin 01/10/2024 0.6  0.4 - 1.1 g/dL Final    Beta Globulin 01/10/2024 0.8  0.5 - 1.2 g/dL Final    Gamma 01/10/2024 1.5 (H)  0.5 - 1.4 g/dL Final    Protein Electrophoresis Comment 01/10/2024 Increase in polyclonal gamma globulins.     Final    Immunofixation Comment 01/10/2024 No monoclonal protein detected by immunofixation.   Final    Path Review - Serum Protein Electr* 01/10/2024 Reviewed and approved by MAYRA CANELA on 1/12/24 at 5:10 PM.       Final    Path Review - Serum Immunofixation 01/10/2024 Reviewed and approved by MAYRA CANELA on 1/12/24 at 5:10 PM.       Final    Case Report 01/10/2024    Final                    Value:Flow Cytometry                                    Case: [id number]                                   Authorizing Provider:  Karina Mendoza PA-C          Collected:           01/10/2024 1416              Ordering Location:     [facility]  Received:            01/10/2024 1416                                     Center                                                                       Pathologist:           Cliff Robledo MD                                                        Specimen:                                                                                               Diagnosis 01/10/2024    Final                    Value:This result contains rich  text formatting which cannot be displayed here.      01/10/2024    Final                    Value:This result contains rich text formatting which cannot be displayed here.    Flow Differential 01/10/2024    Final                    Value:This result contains rich text formatting which cannot be displayed here.    Flow Test Ordered 01/10/2024 Acute Panel  not established Final    Specimen Viability 01/10/2024 Acceptable  not established Final    Cell Count 01/10/2024 9.50  not established x10*3/uL Final    Number of Cells Collected 01/10/2024 100,000.00  not established per tube Final    Cytogenetics/Molecular Test Ordered 01/10/2024 No  not established Final    Red Cells 01/10/2024 Abnormal  not established Final    This result contains rich text formatting which cannot be displayed here.    White Cells 01/10/2024 Normal  not established Final    Methodology 01/10/2024    Final                    Value:This result contains rich text formatting which cannot be displayed here.    Protime 01/10/2024 12.8  9.8 - 12.8 seconds Final    INR 01/10/2024 1.1  0.9 - 1.1 Final    aPTT 01/10/2024 34  27 - 38 seconds Final    Fibrinogen 01/10/2024 345  200 - 400 mg/dL Final    Platelets 01/10/2024 76 (L)  150 - 450 x10*3/uL Final    RBC Morphology 01/10/2024 No significant RBC morphology present   Final    Cytogenetics Interpretation 01/10/2024    Final                    Value:This result contains rich text formatting which cannot be displayed here.    Electronically signed and reported* 01/10/2024    Final                    Value:This result contains rich text formatting which cannot be displayed here.    Myeloid Malignancies Results 01/10/2024    Final                    Value:This result contains rich text formatting which cannot be displayed here.    Electronically signed and reported* 01/10/2024    Final                    Value:Leonie Ruano MD PhD    Lab on 11/10/2023   Component Date Value Ref Range Status    WBC  11/10/2023 6.4  4.4 - 11.3 x10*3/uL Final    nRBC 11/10/2023 0.0  0.0 - 0.0 /100 WBCs Final    RBC 11/10/2023 3.06 (L)  4.00 - 5.20 x10*6/uL Final    Hemoglobin 11/10/2023 10.0 (L)  12.0 - 16.0 g/dL Final    Hematocrit 11/10/2023 30.0 (L)  36.0 - 46.0 % Final    MCV 11/10/2023 98  80 - 100 fL Final    MCH 11/10/2023 32.7  26.0 - 34.0 pg Final    MCHC 11/10/2023 33.3  32.0 - 36.0 g/dL Final    RDW 11/10/2023 13.0  11.5 - 14.5 % Final    Platelets 11/10/2023 66 (L)  150 - 450 x10*3/uL Final   Lab Requisition on 10/24/2023   Component Date Value Ref Range Status    HLA Results 10/24/2023 See Attached   Final       Assessment:    58 y.o. female w/PMH s/f polycystic kidney disease referred for thrombocytopenia prior to being placed on kidney transplant list.       Plan:    Reviewed and discussed lab, imaging, and pathology results with patient in detail as well as diagnosis, prognosis, and treatment options.    Discussed Bmbx c/w ITP and doesn't require treatment for now but if in future needs surgery with higher platelet count can offer TPO-RA (Avatrombopag).    F/U w/PCP and renal    RTC in 6 months     Patient verbalized understanding, and all her questions were answered to her satisfaction    30 min spent with patient greater than 50 % of which was spent in consultation and coordination of care via telehealth.

## 2024-03-14 LAB
CHROM ANALY OVERALL INTERP-IMP: NORMAL
ELECTRONICALLY COSIGNED BY CYTOGENETICS: NORMAL
ELECTRONICALLY SIGNED BY CYTOGENETICS: NORMAL
STRUCT VAR ISCN NAME: NORMAL

## 2024-04-09 LAB
BAND RESOLUTION: 400 BANDS
CHROM ANALY OVERALL INTERP-IMP: NORMAL
CHROMOSOME ANALYSIS CELLS ANALYZED: 20 CELLS
CHROMOSOME ANALYSIS CELLS IMAGED: 4 CELLS
CHROMOSOME ANALYSIS HYPERMODAL CELL COUNT: 0 CELLS
CHROMOSOME ANALYSIS HYPOMODAL CELL COUNT: 0 CELLS
CHROMOSOME ANALYSIS MODAL CHROMOSOME NO: 46 CHROMOSOMES
CHROMOSOME ANALYSIS STAINING METHOD: NORMAL
ELECTRONICALLY SIGNED BY CYTOGENETICS: NORMAL
KARYOTYP MAR: 2 CELLS
TOTAL CELLS COUNTED MAR: 20 CELLS

## 2024-04-10 LAB
LAB AP CONSOLIDATED THERANOSTIC REPORT: NORMAL
PATH REPORT.COMMENTS IMP SPEC: NORMAL
PATH REPORT.FINAL DX SPEC: NORMAL
PATH REPORT.GROSS SPEC: NORMAL
PATH REPORT.MICROSCOPIC SPEC OTHER STN: NORMAL
PATH REPORT.RELEVANT HX SPEC: NORMAL
PATH REPORT.TOTAL CANCER: NORMAL

## 2024-05-01 ENCOUNTER — DOCUMENTATION (OUTPATIENT)
Dept: TRANSPLANT | Facility: HOSPITAL | Age: 59
End: 2024-05-01
Payer: MEDICAID

## 2024-05-01 NOTE — PROGRESS NOTES
Tasked out appt for pt to see TI surgeon so we can review any needed updates/testing to get her reactivated on the waiting list.  Son Bertram is aware we will be scheduling this.

## 2024-05-02 ENCOUNTER — DOCUMENTATION (OUTPATIENT)
Dept: TRANSPLANT | Facility: HOSPITAL | Age: 59
End: 2024-05-02
Payer: MEDICAID

## 2024-05-02 NOTE — PROGRESS NOTES
Transplant Candidate Pharmacist Screening Note     Current Outpatient Medications on File Prior to Visit   Medication Sig Dispense Refill    calcitriol (Rocaltrol) 0.25 mcg capsule Take 1 capsule (0.25 mcg) by mouth once daily.      carvedilol (Coreg) 25 mg tablet Take 1 tablet (25 mg) by mouth 2 times a day with meals.      cholecalciferol (Vitamin D-3) 50 MCG (2000 UT) tablet Take 1 tablet (50 mcg) by mouth once daily.      ferrous sulfate, 325 mg ferrous sulfate, tablet Take 1 tablet by mouth 3 times a day.      glipiZIDE (Glucotrol) 5 mg tablet Take 1 tablet (5 mg) by mouth once daily.      hydrALAZINE (Apresoline) 25 mg tablet Take 1 tablet (25 mg) by mouth 3 times a day.      omeprazole (PriLOSEC) 40 mg DR capsule Take 1 capsule (40 mg) by mouth once daily.      ondansetron ODT (Zofran-ODT) 4 mg disintegrating tablet 1 tablet (4 mg) every 6 hours.      simvastatin (Zocor) 20 mg tablet Take 1 tablet (20 mg) by mouth once daily.      sodium bicarbonate 650 mg tablet Take 1 tablet (650 mg) by mouth.      traZODone (Desyrel) 50 mg tablet Take 1 tablet (50 mg) by mouth as needed at bedtime for sleep.      TRUEplus Lancets 33 gauge misc        No current facility-administered medications on file prior to visit.       The patient's reported medications have been reviewed. Based on the above medication list, there are no pharmacologic contraindications to kidney transplantation.    Curtis Spears, PharmD, BCPS, BCTXP   Solid Organ Transplant Clinical Pharmacy Specialist

## 2024-05-03 ENCOUNTER — DOCUMENTATION (OUTPATIENT)
Dept: TRANSPLANT | Facility: HOSPITAL | Age: 59
End: 2024-05-03
Payer: MEDICAID

## 2024-05-03 NOTE — PROGRESS NOTES
Message sent to patient's son re:  getting his mom reactivated on the transplant list.    I let him know we needed to get her platelet count increased and we were working with her doctors on this.

## 2024-05-08 ENCOUNTER — COMMITTEE REVIEW (OUTPATIENT)
Dept: TRANSPLANT | Facility: HOSPITAL | Age: 59
End: 2024-05-08
Payer: MEDICAID

## 2024-05-08 NOTE — LETTER
May 8, 2024    Luis Panda  [address] [place] [address]      Dear Ms. Minbrandt:    Our multi-disciplinary transplant team completed a review of your medical records on 5/2/2024.  ***    Our transplant program consists of surgeons and medical doctors who provide coverage 365 days a year, 24 hours a day.     If you have any questions or concerns regarding your insurance coverage or billing issues, a  is available to speak with you.     It is important to keep us updated of any major changes in your medical condition, contact information and health insurance coverage.     Please don't hesitate to contact us at Dept: [phone] with any questions or concerns. We look forward to working with you through this process.      Sincerely,      Elvira Cates RN          The [facility] Toll-free Patient Services Line:  Your Resource for Organ Transplant Information    If you have a question regarding your own medical care, you always should call your transplant hospital first. However, for general organ transplant-related information, you should call the United Network for Organ Sharing (UNOS) toll-free patient services line at 1-621.844.4157.  Anyone, including potential transplant candidates, candidates, recipients, family members, friends, living donors, and donor family members, can call this number to:    Talk about organ donation, living donation, the transplant process, the donation process, and transplant policies.  Get a free patient information kit with helpful booklets, waiting list and transplant information, and a list of all transplant hospitals.  Ask questions about the Organ Procurement and Transplantation Network (OPTN) web site (http://optn.transplant.hrsa.gov/), the UNOS Web site (http://unos.org/), or the UNOS web site for living donors and transplant recipients. (http://www.transplantliving.org/).  Learn how UNOS and the OPTN can help you.  Talk about any concerns that you  may have with a transplant hospital.    [facility] is a not-for-profit organization that provides the administrative services for the national OPTN under federal contract to the Health Resources and Services Administration (HRSA), an agency under the U.S. Department of Health and Human Services (HHS).    [facility] and the OPTN are responsible for:    Providing educational material for patients, the public, and professionals.  Raising awareness of the need for donated organs and tissue.  Writing organ transplant policy with help from transplant professionals, transplant patients, transplant candidates, donor families, living donors, and the public.  Coordinating organ procurement, matching, and placement.  Collecting information about every organ transplant and donation that occurs in the [place].    Remember, you should contact your transplant hospital directly if you have questions or concerns about your own medical care including medical records, work-up progress, and test results.    [facility] is not your transplant hospital, and staff at [facility] will not be able to transfer you to your transplant hospital, so keep your transplant hospital’s phone number handy.    However, while you research your transplant needs and learn as much as you can about transplantation and donation, we welcome your call to our toll-free patient services line at [phone].      [facility] PIL Final Rev 1-

## 2024-05-08 NOTE — COMMITTEE REVIEW
Patient Discussion Note     Organ being evaluated for: Kidney    Transplant Coordinator: Elvira Cates  Transplant Surgeon:        Referring Physician:      Committee Review Members:  Finance Yusuf Flor   Lab Marian Schroeder MT   Nutrition NIRALI HILARIO, ERICKSON, LD   Pharmacy Curtis Spears, PharmD    SAULO GANDARA, [facility]   Transplant МАРИНА DALLAS RN, Elvira Cates, SHARRI, Ivory Lawson, SHARRI, Anila Mendoza RN, Brando Stevenson MD, Nirali Horowitz RN   Transplant Nephrology Ellyn Lowery MD, Danielle Nicole MD   Transplant Surgery Ghanshyam You MD       Additional Discussion Notes and Findings:  Patient to remain status 7 at this time.  Our team will reach out to her hematologist to have the patient start medication to help increase her platelet count.

## 2024-05-13 ENCOUNTER — DOCUMENTATION (OUTPATIENT)
Dept: TRANSPLANT | Facility: HOSPITAL | Age: 59
End: 2024-05-13
Payer: MEDICAID

## 2024-06-03 ENCOUNTER — OFFICE VISIT (OUTPATIENT)
Dept: HEMATOLOGY/ONCOLOGY | Facility: CLINIC | Age: 59
End: 2024-06-03
Payer: MEDICAID

## 2024-06-03 VITALS
HEIGHT: 62 IN | SYSTOLIC BLOOD PRESSURE: 145 MMHG | TEMPERATURE: 97.9 F | RESPIRATION RATE: 18 BRPM | WEIGHT: 155.75 LBS | HEART RATE: 76 BPM | DIASTOLIC BLOOD PRESSURE: 79 MMHG | BODY MASS INDEX: 28.66 KG/M2 | OXYGEN SATURATION: 100 %

## 2024-06-03 DIAGNOSIS — D69.6 PLATELETS DECREASED (CMS-HCC): ICD-10-CM

## 2024-06-03 DIAGNOSIS — D69.6 THROMBOCYTOPENIA (CMS-HCC): ICD-10-CM

## 2024-06-03 DIAGNOSIS — D69.3 IDIOPATHIC THROMBOCYTOPENIC PURPURA (MULTI): Primary | ICD-10-CM

## 2024-06-03 LAB
BASOPHILS # BLD AUTO: 0.02 X10*3/UL (ref 0–0.1)
BASOPHILS NFR BLD AUTO: 0.4 %
EOSINOPHIL # BLD AUTO: 0.1 X10*3/UL (ref 0–0.7)
EOSINOPHIL NFR BLD AUTO: 1.8 %
ERYTHROCYTE [DISTWIDTH] IN BLOOD BY AUTOMATED COUNT: 12.6 % (ref 11.5–14.5)
HCT VFR BLD AUTO: 27.8 % (ref 36–46)
HGB BLD-MCNC: 9 G/DL (ref 12–16)
HOLD SPECIMEN: NORMAL
IMM GRANULOCYTES # BLD AUTO: 0 X10*3/UL (ref 0–0.7)
IMM GRANULOCYTES NFR BLD AUTO: 0 % (ref 0–0.9)
LYMPHOCYTES # BLD AUTO: 1.08 X10*3/UL (ref 1.2–4.8)
LYMPHOCYTES NFR BLD AUTO: 19.8 %
MCH RBC QN AUTO: 32.4 PG (ref 26–34)
MCHC RBC AUTO-ENTMCNC: 32.4 G/DL (ref 32–36)
MCV RBC AUTO: 100 FL (ref 80–100)
MONOCYTES # BLD AUTO: 0.38 X10*3/UL (ref 0.1–1)
MONOCYTES NFR BLD AUTO: 7 %
NEUTROPHILS # BLD AUTO: 3.88 X10*3/UL (ref 1.2–7.7)
NEUTROPHILS NFR BLD AUTO: 71 %
NRBC BLD-RTO: ABNORMAL /100{WBCS}
PLATELET # BLD AUTO: 80 X10*3/UL (ref 150–450)
RBC # BLD AUTO: 2.78 X10*6/UL (ref 4–5.2)
RBC MORPH BLD: NORMAL
WBC # BLD AUTO: 5.5 X10*3/UL (ref 4.4–11.3)

## 2024-06-03 PROCEDURE — 85025 COMPLETE CBC W/AUTO DIFF WBC: CPT | Performed by: PHYSICIAN ASSISTANT

## 2024-06-03 PROCEDURE — 3078F DIAST BP <80 MM HG: CPT | Performed by: PHYSICIAN ASSISTANT

## 2024-06-03 PROCEDURE — 36415 COLL VENOUS BLD VENIPUNCTURE: CPT | Performed by: PHYSICIAN ASSISTANT

## 2024-06-03 PROCEDURE — 99214 OFFICE O/P EST MOD 30 MIN: CPT | Performed by: PHYSICIAN ASSISTANT

## 2024-06-03 PROCEDURE — 3077F SYST BP >= 140 MM HG: CPT | Performed by: PHYSICIAN ASSISTANT

## 2024-06-03 ASSESSMENT — PAIN SCALES - GENERAL: PAINLEVEL: 0-NO PAIN

## 2024-06-03 NOTE — PROGRESS NOTES
Reason for Visit  Luis Panda is a 59 y.o. female w/PMH s/f polycystic kidney disease referred by Dr. Luciano for thrombocytopenia prior to being  placed on kidney transplant list.     Upon review of labs, noted to have thrombocytopenia since 2021 in the 's. Normocytic-macrocytic anemia. Normal WBC.    Patient presents with son and . Translation done by son. On assessment, reports long standing h/o constipation and chronic shoulder pain otherwise feeling well. Denies bleeding or bruising.     PMH/PSH: PKD, DMII, HTN, HL, fistula left arm.  FH: Denies FH of cancers, bleeding or clotting disorder.   Soc Hx: former smoker, denies ETOH, illicit drugs; , 4 children.    History of Present Illness:  Patient presents for follow up accompanied by grandson, Aden and . Patient placed on kidney transplant list and requires platelets to be above 100. On assessment, patient feeling well and denies any bleeding or excessive bruising.     Review of Systems: All of the systems have been reviewed and are negative for complaints except what is stated in the HPI and/or past medical history.    Allergies and Intolerances:  Allergies   Allergen Reactions    Amlodipine Unknown     LEG SWELLING    Oxycodone Unknown     Dizziness, weakness            Current Outpatient Medications   Medication Sig Dispense Refill    calcitriol (Rocaltrol) 0.25 mcg capsule Take 1 capsule (0.25 mcg) by mouth once daily.      carvedilol (Coreg) 25 mg tablet Take 1 tablet (25 mg) by mouth 2 times a day with meals.      cholecalciferol (Vitamin D-3) 50 MCG (2000 UT) tablet Take 1 tablet (50 mcg) by mouth once daily.      ferrous sulfate, 325 mg ferrous sulfate, tablet Take 1 tablet by mouth 3 times a day.      glipiZIDE (Glucotrol) 5 mg tablet Take 1 tablet (5 mg) by mouth once daily.      hydrALAZINE (Apresoline) 25 mg tablet Take 1 tablet (25 mg) by mouth 3 times a day.      omeprazole (PriLOSEC) 40 mg DR capsule Take 1  "capsule (40 mg) by mouth once daily.      ondansetron ODT (Zofran-ODT) 4 mg disintegrating tablet 1 tablet (4 mg) every 6 hours.      simvastatin (Zocor) 20 mg tablet Take 1 tablet (20 mg) by mouth once daily.      sodium bicarbonate 650 mg tablet Take 1 tablet (650 mg) by mouth.      traZODone (Desyrel) 50 mg tablet Take 1 tablet (50 mg) by mouth as needed at bedtime for sleep.      TRUEplus Lancets 33 gauge misc        No current facility-administered medications for this visit.        Vitals and Measurements:       2/28/2024     9:15 AM 2/28/2024     9:30 AM 2/28/2024    10:00 AM 2/28/2024    10:15 AM 2/28/2024    10:29 AM 2/28/2024    11:15 AM 6/3/2024    11:22 AM   Vitals   Systolic 181 168 173 175 178 180 145   Diastolic 94 93 91 92 90 89 79   Heart Rate 76 74 72 71 73 72 76   Temp       36.6 °C (97.9 °F)   Resp 16 14 14 12 14 16 18   Height (in)       1.58 m (5' 2.21\")    Weight (lb)       155.76   BMI       28.3 kg/m2   BSA (m2)       1.76 m2   Visit Report       Report       Significant value     Physical Exam:   Constitutional: alert, awake and oriented, not in acute distress   HEENT: moist mucous membranes, normal nose   Neck: supple, no lymphadenopathy   EYES: PERRL, EOM intact, conjunctiva normal  Skin: no jaundice, rash or erythema  Neurological: AAOx3, no gross focal deficit   Psychiatric: normal mood and behavior      Labs:  Lab Results   Component Value Date    WBC 5.5 06/03/2024    NEUTROABS 3.88 06/03/2024    IGABSOL 0.00 06/03/2024    LYMPHSABS 1.08 (L) 06/03/2024    MONOSABS 0.38 06/03/2024    EOSABS 0.10 06/03/2024    BASOSABS 0.02 06/03/2024    RBC 2.78 (L) 06/03/2024     06/03/2024    MCHC 32.4 06/03/2024    HGB 9.0 (L) 06/03/2024    HCT 27.8 (L) 06/03/2024    PLT 80 (L) 06/03/2024     No results found for: \"RETICCTPCT\"   Lab Results   Component Value Date    CREATININE 5.58 (H) 01/10/2024    BUN 46 (H) 01/10/2024    EGFR 8 (L) 01/10/2024     01/10/2024    K 3.7 01/10/2024    CL " "110 (H) 01/10/2024    CO2 18 (L) 01/10/2024      Lab Results   Component Value Date    ALT 19 01/10/2024    AST 20 01/10/2024    ALKPHOS 67 01/10/2024    BILITOT 0.6 01/10/2024      No results found for: \"TSH\"  No results found for: \"TSH\", \"X2FBGZD\", \"S4VZSVR\", \"THYROIDPAB\"  Lab Results   Component Value Date    IRON 81 01/10/2024    TIBC 261 01/10/2024    FERRITIN 557 (H) 01/10/2024      Lab Results   Component Value Date    NIEXWSQD16 311 01/10/2024      Lab Results   Component Value Date     01/10/2024     Lab Results   Component Value Date    SPEP Increase in polyclonal gamma globulins.   01/10/2024     Lab Results   Component Value Date    IGG 1,750 (H) 01/10/2024    IGM 42 01/10/2024     01/10/2024     Assessment:    58 y.o. female w/PMH s/f polycystic kidney disease referred for thrombocytopenia prior to being placed on kidney transplant list.     Discussed Bmbx c/w ITP and doesn't require treatment for now but if in future needs surgery with higher platelet count can offer TPO-RA (Avatrombopag).     Plan:    Reviewed and discussed lab, imaging, and pathology results with patient in detail as well as diagnosis, prognosis, and treatment options.    Discussed starting patient on Promacta to get platelets to 100. Discussed potential side effects and cost.     F/U w/PCP and renal    RTC in 3 months     Patient verbalized understanding, and all her questions were answered to her satisfaction    30 min spent with patient greater than 50 % of which was spent in consultation and coordination of care.  "

## 2024-06-11 ENCOUNTER — SPECIALTY PHARMACY (OUTPATIENT)
Dept: PHARMACY | Facility: CLINIC | Age: 59
End: 2024-06-11

## 2024-06-28 ENCOUNTER — SPECIALTY PHARMACY (OUTPATIENT)
Dept: HEMATOLOGY/ONCOLOGY | Facility: HOSPITAL | Age: 59
End: 2024-06-28
Payer: MEDICAID

## 2024-07-22 PROCEDURE — RXMED WILLOW AMBULATORY MEDICATION CHARGE

## 2024-07-24 ENCOUNTER — SPECIALTY PHARMACY (OUTPATIENT)
Dept: PHARMACY | Facility: CLINIC | Age: 59
End: 2024-07-24

## 2024-07-24 ENCOUNTER — PHARMACY VISIT (OUTPATIENT)
Dept: PHARMACY | Facility: CLINIC | Age: 59
End: 2024-07-24
Payer: MEDICAID

## 2024-07-25 ENCOUNTER — SPECIALTY PHARMACY (OUTPATIENT)
Dept: PHARMACY | Facility: CLINIC | Age: 59
End: 2024-07-25

## 2024-07-25 NOTE — PROGRESS NOTES
[facility] Clinical Note    Luis Panda is a 59 y.o. female, who is on the specialty pharmacy service for management of:  Blood Modifiers Core.    Luis Panda is taking: Promacta.    Medication Receipt Date: 7/25/24  Medication Start Date (planned or actual): 7/25/24    Luis was contacted on 7/25/2024 at 1:37 PM for a virtual pharmacy visit with encounter number [id number] from:   [facility]  [address] [place] [id number]  Dept: [phone]  Dept Fax: [phone]    Luis was offered a Telemedicine Video visit or Telephone visit.  Luis's son Aden consented to a telephone visit, which was performed.    The most recent encounter visit with the referring prescriber Karina Mendoza PA-C on 6/3/24 was reviewed.  Pharmacy will continue to collaborate in the care of this patient with the referring prescriber Karina Mendoza PA-C.    General Assessment      Impression/Plan  IMPRESSION/PLAN:  Is patient high risk (potential patients:  pregnancy, geriatric, pediatric)? No   Is laboratory follow-up needed? CBC pending  Is a clinical intervention needed? No  Next reassessment date? 10/23/24  Additional comments: N/A    Refer to the encounter summary report for documentation details about patient counseling and education.      Medication Adherence    The importance of adherence was discussed with the patient and they were advised to take the medication as prescribed by their provider. Patient was encouraged to call their physician's office if they have a question regarding a missed dose.     QOL/Patient Satisfaction  Rate your quality of life on scale of 1-10: -- (N/A)  Rate your satisfaction with  Specialty Pharmacy on scale of 1-10: -- (N/A)      Patient was advised to contact the pharmacy if there are any changes to their medication list, including prescriptions, OTC medications, herbal products, or supplements. Patient was advised of [facility]'s dispensing process, refill timeline, contact information ([phone]), and patient management follow up. Patient confirmed understanding of education conducted during assessment. All patient questions and concerns were addressed to the best of my ability. Patient was encouraged to contact the specialty pharmacy with any questions or concerns.    Confirmed follow-up outreaches are properly scheduled and reviewed goals of therapy with the patient.        JULIO CÉSAR KAUFMAN, DeeD

## 2024-08-20 PROCEDURE — RXMED WILLOW AMBULATORY MEDICATION CHARGE

## 2024-08-22 ENCOUNTER — PHARMACY VISIT (OUTPATIENT)
Dept: PHARMACY | Facility: CLINIC | Age: 59
End: 2024-08-22
Payer: MEDICAID

## 2024-08-26 ENCOUNTER — OFFICE VISIT (OUTPATIENT)
Dept: HEMATOLOGY/ONCOLOGY | Facility: CLINIC | Age: 59
End: 2024-08-26
Payer: MEDICAID

## 2024-08-26 VITALS
OXYGEN SATURATION: 99 % | TEMPERATURE: 97.9 F | RESPIRATION RATE: 17 BRPM | HEART RATE: 76 BPM | BODY MASS INDEX: 27.94 KG/M2 | WEIGHT: 153.77 LBS | DIASTOLIC BLOOD PRESSURE: 84 MMHG | SYSTOLIC BLOOD PRESSURE: 167 MMHG

## 2024-08-26 DIAGNOSIS — D69.6 THROMBOCYTOPENIA (CMS-HCC): ICD-10-CM

## 2024-08-26 DIAGNOSIS — D64.9 ANEMIA: Primary | ICD-10-CM

## 2024-08-26 DIAGNOSIS — D69.6 PLATELETS DECREASED (CMS-HCC): ICD-10-CM

## 2024-08-26 DIAGNOSIS — D69.3 IDIOPATHIC THROMBOCYTOPENIC PURPURA (MULTI): ICD-10-CM

## 2024-08-26 LAB
ALBUMIN SERPL BCP-MCNC: 3.8 G/DL (ref 3.4–5)
ALP SERPL-CCNC: 59 U/L (ref 33–136)
ALT SERPL W P-5'-P-CCNC: 22 U/L (ref 7–45)
ANION GAP SERPL CALC-SCNC: 15 MMOL/L (ref 10–20)
AST SERPL W P-5'-P-CCNC: 22 U/L (ref 9–39)
BASOPHILS # BLD AUTO: 0.03 X10*3/UL (ref 0–0.1)
BASOPHILS NFR BLD AUTO: 0.4 %
BILIRUB SERPL-MCNC: 0.8 MG/DL (ref 0–1.2)
BUN SERPL-MCNC: 40 MG/DL (ref 6–23)
CALCIUM SERPL-MCNC: 8.7 MG/DL (ref 8.6–10.3)
CHLORIDE SERPL-SCNC: 112 MMOL/L (ref 98–107)
CO2 SERPL-SCNC: 18 MMOL/L (ref 21–32)
CREAT SERPL-MCNC: 6.3 MG/DL (ref 0.5–1.05)
EGFRCR SERPLBLD CKD-EPI 2021: 7 ML/MIN/1.73M*2
EOSINOPHIL # BLD AUTO: 0.11 X10*3/UL (ref 0–0.7)
EOSINOPHIL NFR BLD AUTO: 1.6 %
ERYTHROCYTE [DISTWIDTH] IN BLOOD BY AUTOMATED COUNT: 13 % (ref 11.5–14.5)
GLUCOSE SERPL-MCNC: 93 MG/DL (ref 74–99)
HCT VFR BLD AUTO: 28.8 % (ref 36–46)
HGB BLD-MCNC: 9.1 G/DL (ref 12–16)
HOLD SPECIMEN: NORMAL
HOLD SPECIMEN: NORMAL
IMM GRANULOCYTES # BLD AUTO: 0 X10*3/UL (ref 0–0.7)
IMM GRANULOCYTES NFR BLD AUTO: 0 % (ref 0–0.9)
LYMPHOCYTES # BLD AUTO: 1.08 X10*3/UL (ref 1.2–4.8)
LYMPHOCYTES NFR BLD AUTO: 16 %
MCH RBC QN AUTO: 31.9 PG (ref 26–34)
MCHC RBC AUTO-ENTMCNC: 31.6 G/DL (ref 32–36)
MCV RBC AUTO: 101 FL (ref 80–100)
MONOCYTES # BLD AUTO: 0.69 X10*3/UL (ref 0.1–1)
MONOCYTES NFR BLD AUTO: 10.2 %
NEUTROPHILS # BLD AUTO: 4.85 X10*3/UL (ref 1.2–7.7)
NEUTROPHILS NFR BLD AUTO: 71.8 %
PLATELET # BLD AUTO: 307 X10*3/UL (ref 150–450)
POTASSIUM SERPL-SCNC: 4.6 MMOL/L (ref 3.5–5.3)
PROT SERPL-MCNC: 6.9 G/DL (ref 6.4–8.2)
RBC # BLD AUTO: 2.85 X10*6/UL (ref 4–5.2)
SODIUM SERPL-SCNC: 140 MMOL/L (ref 136–145)
WBC # BLD AUTO: 6.8 X10*3/UL (ref 4.4–11.3)

## 2024-08-26 PROCEDURE — 36415 COLL VENOUS BLD VENIPUNCTURE: CPT | Performed by: PHYSICIAN ASSISTANT

## 2024-08-26 PROCEDURE — 99214 OFFICE O/P EST MOD 30 MIN: CPT | Performed by: PHYSICIAN ASSISTANT

## 2024-08-26 PROCEDURE — 3077F SYST BP >= 140 MM HG: CPT | Performed by: PHYSICIAN ASSISTANT

## 2024-08-26 PROCEDURE — 3079F DIAST BP 80-89 MM HG: CPT | Performed by: PHYSICIAN ASSISTANT

## 2024-08-26 PROCEDURE — 85025 COMPLETE CBC W/AUTO DIFF WBC: CPT | Performed by: PHYSICIAN ASSISTANT

## 2024-08-26 PROCEDURE — 80053 COMPREHEN METABOLIC PANEL: CPT | Performed by: PHYSICIAN ASSISTANT

## 2024-08-26 ASSESSMENT — PAIN SCALES - GENERAL: PAINLEVEL: 0-NO PAIN

## 2024-08-29 ENCOUNTER — PHARMACY VISIT (OUTPATIENT)
Dept: PHARMACY | Facility: CLINIC | Age: 59
End: 2024-08-29
Payer: MEDICAID

## 2024-08-29 ENCOUNTER — SPECIALTY PHARMACY (OUTPATIENT)
Dept: PHARMACY | Facility: CLINIC | Age: 59
End: 2024-08-29

## 2024-08-29 PROCEDURE — RXMED WILLOW AMBULATORY MEDICATION CHARGE

## 2024-09-05 ENCOUNTER — APPOINTMENT (OUTPATIENT)
Dept: HEMATOLOGY/ONCOLOGY | Facility: CLINIC | Age: 59
End: 2024-09-05
Payer: MEDICAID

## 2024-09-09 ENCOUNTER — OFFICE VISIT (OUTPATIENT)
Dept: HEMATOLOGY/ONCOLOGY | Facility: CLINIC | Age: 59
End: 2024-09-09
Payer: MEDICAID

## 2024-09-09 VITALS
HEART RATE: 79 BPM | TEMPERATURE: 96.8 F | BODY MASS INDEX: 27.76 KG/M2 | DIASTOLIC BLOOD PRESSURE: 69 MMHG | RESPIRATION RATE: 16 BRPM | WEIGHT: 152.78 LBS | OXYGEN SATURATION: 100 % | SYSTOLIC BLOOD PRESSURE: 184 MMHG

## 2024-09-09 DIAGNOSIS — D69.6 PLATELETS DECREASED (CMS-HCC): Primary | ICD-10-CM

## 2024-09-09 DIAGNOSIS — Z01.818 PRE-TRANSPLANT EVALUATION FOR KIDNEY TRANSPLANT: ICD-10-CM

## 2024-09-09 DIAGNOSIS — D64.9 ANEMIA: ICD-10-CM

## 2024-09-09 DIAGNOSIS — D69.6 THROMBOCYTOPENIA (CMS-HCC): ICD-10-CM

## 2024-09-09 LAB
ALBUMIN SERPL BCP-MCNC: 3.8 G/DL (ref 3.4–5)
ALP SERPL-CCNC: 63 U/L (ref 33–136)
ALT SERPL W P-5'-P-CCNC: 17 U/L (ref 7–45)
ANION GAP SERPL CALC-SCNC: 14 MMOL/L (ref 10–20)
AST SERPL W P-5'-P-CCNC: 21 U/L (ref 9–39)
BASOPHILS # BLD AUTO: 0.03 X10*3/UL (ref 0–0.1)
BASOPHILS NFR BLD AUTO: 0.5 %
BILIRUB SERPL-MCNC: 0.6 MG/DL (ref 0–1.2)
BUN SERPL-MCNC: 41 MG/DL (ref 6–23)
CALCIUM SERPL-MCNC: 8.8 MG/DL (ref 8.6–10.3)
CHLORIDE SERPL-SCNC: 109 MMOL/L (ref 98–107)
CO2 SERPL-SCNC: 19 MMOL/L (ref 21–32)
CREAT SERPL-MCNC: 5.85 MG/DL (ref 0.5–1.05)
EGFRCR SERPLBLD CKD-EPI 2021: 8 ML/MIN/1.73M*2
EOSINOPHIL # BLD AUTO: 0.13 X10*3/UL (ref 0–0.7)
EOSINOPHIL NFR BLD AUTO: 2.1 %
ERYTHROCYTE [DISTWIDTH] IN BLOOD BY AUTOMATED COUNT: 13.1 % (ref 11.5–14.5)
GLUCOSE SERPL-MCNC: 109 MG/DL (ref 74–99)
HCT VFR BLD AUTO: 28 % (ref 36–46)
HGB BLD-MCNC: 9.1 G/DL (ref 12–16)
IMM GRANULOCYTES # BLD AUTO: 0 X10*3/UL (ref 0–0.7)
IMM GRANULOCYTES NFR BLD AUTO: 0 % (ref 0–0.9)
LYMPHOCYTES # BLD AUTO: 0.88 X10*3/UL (ref 1.2–4.8)
LYMPHOCYTES NFR BLD AUTO: 14.3 %
MCH RBC QN AUTO: 31.8 PG (ref 26–34)
MCHC RBC AUTO-ENTMCNC: 32.5 G/DL (ref 32–36)
MCV RBC AUTO: 98 FL (ref 80–100)
MONOCYTES # BLD AUTO: 0.5 X10*3/UL (ref 0.1–1)
MONOCYTES NFR BLD AUTO: 8.1 %
NEUTROPHILS # BLD AUTO: 4.6 X10*3/UL (ref 1.2–7.7)
NEUTROPHILS NFR BLD AUTO: 75 %
PLATELET # BLD AUTO: 194 X10*3/UL (ref 150–450)
POTASSIUM SERPL-SCNC: 4.1 MMOL/L (ref 3.5–5.3)
PROT SERPL-MCNC: 6.6 G/DL (ref 6.4–8.2)
RBC # BLD AUTO: 2.86 X10*6/UL (ref 4–5.2)
SODIUM SERPL-SCNC: 138 MMOL/L (ref 136–145)
WBC # BLD AUTO: 6.1 X10*3/UL (ref 4.4–11.3)

## 2024-09-09 PROCEDURE — 80053 COMPREHEN METABOLIC PANEL: CPT | Performed by: PHYSICIAN ASSISTANT

## 2024-09-09 PROCEDURE — 99213 OFFICE O/P EST LOW 20 MIN: CPT | Performed by: PHYSICIAN ASSISTANT

## 2024-09-09 PROCEDURE — 86780 TREPONEMA PALLIDUM: CPT | Mod: GEALAB | Performed by: PHYSICIAN ASSISTANT

## 2024-09-09 PROCEDURE — 83718 ASSAY OF LIPOPROTEIN: CPT | Performed by: PHYSICIAN ASSISTANT

## 2024-09-09 PROCEDURE — 82565 ASSAY OF CREATININE: CPT | Performed by: PHYSICIAN ASSISTANT

## 2024-09-09 PROCEDURE — 87389 HIV-1 AG W/HIV-1&-2 AB AG IA: CPT | Mod: GEALAB | Performed by: PHYSICIAN ASSISTANT

## 2024-09-09 PROCEDURE — 36415 COLL VENOUS BLD VENIPUNCTURE: CPT | Performed by: PHYSICIAN ASSISTANT

## 2024-09-09 PROCEDURE — 86704 HEP B CORE ANTIBODY TOTAL: CPT | Mod: GEALAB | Performed by: PHYSICIAN ASSISTANT

## 2024-09-09 PROCEDURE — 86706 HEP B SURFACE ANTIBODY: CPT | Mod: GEALAB | Performed by: PHYSICIAN ASSISTANT

## 2024-09-09 PROCEDURE — 87340 HEPATITIS B SURFACE AG IA: CPT | Mod: GEALAB | Performed by: PHYSICIAN ASSISTANT

## 2024-09-09 PROCEDURE — 86644 CMV ANTIBODY: CPT | Mod: GEALAB | Performed by: PHYSICIAN ASSISTANT

## 2024-09-09 PROCEDURE — 3078F DIAST BP <80 MM HG: CPT | Performed by: PHYSICIAN ASSISTANT

## 2024-09-09 PROCEDURE — 3077F SYST BP >= 140 MM HG: CPT | Performed by: PHYSICIAN ASSISTANT

## 2024-09-09 PROCEDURE — 86663 EPSTEIN-BARR ANTIBODY: CPT | Mod: GEALAB | Performed by: PHYSICIAN ASSISTANT

## 2024-09-09 PROCEDURE — 84520 ASSAY OF UREA NITROGEN: CPT | Performed by: PHYSICIAN ASSISTANT

## 2024-09-09 PROCEDURE — 86787 VARICELLA-ZOSTER ANTIBODY: CPT | Mod: GEALAB | Performed by: PHYSICIAN ASSISTANT

## 2024-09-09 PROCEDURE — 84100 ASSAY OF PHOSPHORUS: CPT | Performed by: PHYSICIAN ASSISTANT

## 2024-09-09 PROCEDURE — 82150 ASSAY OF AMYLASE: CPT | Performed by: PHYSICIAN ASSISTANT

## 2024-09-09 PROCEDURE — 85025 COMPLETE CBC W/AUTO DIFF WBC: CPT | Performed by: PHYSICIAN ASSISTANT

## 2024-09-09 ASSESSMENT — PAIN SCALES - GENERAL: PAINLEVEL: 0-NO PAIN

## 2024-09-09 NOTE — PROGRESS NOTES
Reason for Visit  Luis Panda is a 59 y.o. female w/PMH s/f polycystic kidney disease referred by Dr. Luciano for thrombocytopenia prior to being  placed on kidney transplant list.     Upon review of labs, noted to have thrombocytopenia since 2021 in the 's. Normocytic-macrocytic anemia. Normal WBC.    Patient presents with son and . Translation done by son. On assessment, reports long standing h/o constipation and chronic shoulder pain otherwise feeling well. Denies bleeding or bruising.     PMH/PSH: PKD, DMII, HTN, HL, fistula left arm.  FH: Denies FH of cancers, bleeding or clotting disorder.   Soc Hx: former smoker, denies ETOH, illicit drugs; , 4 children.    History of Present Illness:  Patient presents for follow up accompanied by grandson and . Patient on Promacta 12.5 mg, tolerating well. On assessment, patient feeling well and denies any bleeding or excessive bruising.     Review of Systems: All of the systems have been reviewed and are negative for complaints except what is stated in the HPI and/or past medical history.    Allergies and Intolerances:  Allergies   Allergen Reactions    Amlodipine Unknown     LEG SWELLING    Oxycodone Unknown     Dizziness, weakness            Current Outpatient Medications   Medication Sig Dispense Refill    calcitriol (Rocaltrol) 0.25 mcg capsule Take 1 capsule (0.25 mcg) by mouth once daily.      carvedilol (Coreg) 25 mg tablet Take 1 tablet (25 mg) by mouth 2 times a day with meals.      cholecalciferol (Vitamin D-3) 50 MCG (2000 UT) tablet Take 1 tablet (50 mcg) by mouth once daily.      eltrombopag olamine (Promacta) 12.5 mg tablet Take 1 tablet (12.5 mg) by mouth once daily in the morning. Take before meals. Take on an empty stomach, 1 hour before or 2 hours afer a meal. 30 tablet 3    ferrous sulfate, 325 mg ferrous sulfate, tablet Take 1 tablet by mouth 3 times a day.      glipiZIDE (Glucotrol) 5 mg tablet Take 1 tablet (5 mg) by  "mouth once daily.      hydrALAZINE (Apresoline) 25 mg tablet Take 1 tablet (25 mg) by mouth 3 times a day.      omeprazole (PriLOSEC) 40 mg DR capsule Take 1 capsule (40 mg) by mouth once daily.      ondansetron ODT (Zofran-ODT) 4 mg disintegrating tablet 1 tablet (4 mg) every 6 hours.      simvastatin (Zocor) 20 mg tablet Take 1 tablet (20 mg) by mouth once daily.      sodium bicarbonate 650 mg tablet Take 1 tablet (650 mg) by mouth.      traZODone (Desyrel) 50 mg tablet Take 1 tablet (50 mg) by mouth as needed at bedtime for sleep.      TRUEplus Lancets 33 gauge misc        No current facility-administered medications for this visit.        Vitals and Measurements:       2/28/2024    10:00 AM 2/28/2024    10:15 AM 2/28/2024    10:29 AM 2/28/2024    11:15 AM 6/3/2024    11:22 AM 8/26/2024    10:37 AM 9/9/2024    11:14 AM   Vitals   Systolic 173 175 178 180 145 167 184   Diastolic 91 92 90 89 79 84 69   Heart Rate 72 71 73 72 76 76 79   Temp     36.6 °C (97.9 °F) 36.6 °C (97.9 °F) 36 °C (96.8 °F)   Resp 14 12 14 16 18 17 16   Height (in)     1.58 m (5' 2.21\")      Weight (lb)     155.76 153.77 152.78   BMI     28.3 kg/m2 27.94 kg/m2 27.76 kg/m2   BSA (m2)     1.76 m2 1.75 m2 1.74 m2   Visit Report     Report Report Report       Significant value     Physical Exam:   Constitutional: alert, awake and oriented, not in acute distress   HEENT: moist mucous membranes, normal nose   Neck: supple, no lymphadenopathy   EYES: PERRL, EOM intact, conjunctiva normal  Skin: no jaundice, rash or erythema  Neurological: AAOx3, no gross focal deficit   Psychiatric: normal mood and behavior      Labs:  Labs:  Lab Results   Component Value Date    WBC 6.1 09/09/2024    NEUTROABS 4.60 09/09/2024    IGABSOL 0.00 09/09/2024    LYMPHSABS 0.88 (L) 09/09/2024    MONOSABS 0.50 09/09/2024    EOSABS 0.13 09/09/2024    BASOSABS 0.03 09/09/2024    RBC 2.86 (L) 09/09/2024    MCV 98 09/09/2024    MCHC 32.5 09/09/2024    HGB 9.1 (L) 09/09/2024    " "HCT 28.0 (L) 09/09/2024     09/09/2024     No results found for: \"RETICCTPCT\"   Lab Results   Component Value Date    CREATININE 6.30 (H) 08/26/2024    BUN 40 (H) 08/26/2024    EGFR 7 (L) 08/26/2024     08/26/2024    K 4.6 08/26/2024     (H) 08/26/2024    CO2 18 (L) 08/26/2024      Lab Results   Component Value Date    ALT 22 08/26/2024    AST 22 08/26/2024    ALKPHOS 59 08/26/2024    BILITOT 0.8 08/26/2024       Assessment:    58 y.o. female w/PMH s/f polycystic kidney disease referred for thrombocytopenia prior to being placed on kidney transplant list.     Discussed Bmbx c/w ITP and doesn't require treatment for now but if in future needs surgery with higher platelet count can offer TPO-RA (Avatrombopag).     Discussed starting patient on Promacta to get platelets to 100. Discussed potential side effects and cost.     Plan:    Reviewed and discussed lab, imaging, and pathology results with patient in detail as well as diagnosis, prognosis, and treatment options.    Platelet count 194 discussed lowering dosage of Promacta to 12.5 mg every other day.    Will need closer monitoring of platelet count and Promacta.    F/U w/PCP and renal    RTC in 4 weeks     Patient verbalized understanding, and all her questions were answered to her satisfaction    30 min spent with patient greater than 50 % of which was spent in consultation and coordination of care.  "

## 2024-09-10 ENCOUNTER — TELEPHONE (OUTPATIENT)
Dept: TRANSPLANT | Facility: HOSPITAL | Age: 59
End: 2024-09-10
Payer: MEDICAID

## 2024-09-13 ENCOUNTER — DOCUMENTATION (OUTPATIENT)
Dept: TRANSPLANT | Facility: HOSPITAL | Age: 59
End: 2024-09-13
Payer: MEDICAID

## 2024-09-13 ENCOUNTER — OFFICE VISIT (OUTPATIENT)
Dept: TRANSPLANT | Facility: HOSPITAL | Age: 59
End: 2024-09-13
Payer: MEDICAID

## 2024-09-13 VITALS
HEART RATE: 73 BPM | OXYGEN SATURATION: 98 % | BODY MASS INDEX: 28 KG/M2 | SYSTOLIC BLOOD PRESSURE: 193 MMHG | DIASTOLIC BLOOD PRESSURE: 80 MMHG | WEIGHT: 154.1 LBS | TEMPERATURE: 97.3 F

## 2024-09-13 DIAGNOSIS — N18.5 STAGE 5 CHRONIC KIDNEY DISEASE NOT ON CHRONIC DIALYSIS (MULTI): Primary | ICD-10-CM

## 2024-09-13 DIAGNOSIS — Z01.818 PRE-TRANSPLANT EVALUATION FOR KIDNEY TRANSPLANT: ICD-10-CM

## 2024-09-13 LAB
AMYLASE SERPL-CCNC: 85 U/L (ref 29–103)
BUN SERPL-MCNC: 44 MG/DL (ref 6–23)
CHOLEST SERPL-MCNC: 104 MG/DL (ref 0–199)
CHOLESTEROL/HDL RATIO: 2.3
CREAT SERPL-MCNC: 6.18 MG/DL (ref 0.5–1.05)
EGFRCR SERPLBLD CKD-EPI 2021: 7 ML/MIN/1.73M*2
HDLC SERPL-MCNC: 45.5 MG/DL
NON-HDL CHOLESTEROL: 59 MG/DL (ref 0–149)
PHOSPHATE SERPL-MCNC: 4.5 MG/DL (ref 2.5–4.9)

## 2024-09-13 PROCEDURE — 3077F SYST BP >= 140 MM HG: CPT | Performed by: STUDENT IN AN ORGANIZED HEALTH CARE EDUCATION/TRAINING PROGRAM

## 2024-09-13 PROCEDURE — 99215 OFFICE O/P EST HI 40 MIN: CPT | Performed by: STUDENT IN AN ORGANIZED HEALTH CARE EDUCATION/TRAINING PROGRAM

## 2024-09-13 PROCEDURE — 3079F DIAST BP 80-89 MM HG: CPT | Performed by: STUDENT IN AN ORGANIZED HEALTH CARE EDUCATION/TRAINING PROGRAM

## 2024-09-13 RX ORDER — REGADENOSON 0.08 MG/ML
0.4 INJECTION, SOLUTION INTRAVENOUS
OUTPATIENT
Start: 2024-09-13

## 2024-09-13 RX ORDER — AMINOPHYLLINE 25 MG/ML
125 INJECTION, SOLUTION INTRAVENOUS ONCE AS NEEDED
OUTPATIENT
Start: 2024-09-13

## 2024-09-13 ASSESSMENT — PAIN SCALES - GENERAL: PAINLEVEL: 0-NO PAIN

## 2024-09-13 NOTE — PROGRESS NOTES
Patient attended appointment on 09/13/2024 with Dr. Adkins.  Spouse and son present. Patient ambulated. Patient is pre-dialysis. Health screenings reviewed. Recent hospitalizations:  No. Blood transfusions:  No.    Comments:  Pt is okay per Dr. Hernandez - platelets around 190, pt is on Promacta.    Per Dr. Adkins needs updated echo and stress.    Pt also needs a mammogram, son asked for a referral so will put one in.    Per son Bertram pt does not have any plans with her neph to work on access yet.   Her GFR is 8 but she is doing okay.  Son is going out of town soon and said they will work on access when he comes back.      Demographic updates:  Added some backup contacts for Mrs. Panda.

## 2024-09-14 LAB
CMV IGG AVIDITY SERPL IA-RTO: REACTIVE %
EBV EA IGG SER QL: NEGATIVE
EBV NA AB SER QL: POSITIVE
EBV VCA IGG SER IA-ACNC: NEGATIVE
EBV VCA IGM SER IA-ACNC: NEGATIVE
HBV CORE AB SER QL: NONREACTIVE
HBV SURFACE AB SER-ACNC: <3.1 MIU/ML
HBV SURFACE AG SERPL QL IA: NONREACTIVE
HIV 1+2 AB+HIV1 P24 AG SERPL QL IA: NONREACTIVE
TREPONEMA PALLIDUM IGG+IGM AB [PRESENCE] IN SERUM OR PLASMA BY IMMUNOASSAY: NONREACTIVE
VARICELLA ZOSTER IGG INDEX: 1.3 IA
VZV IGG SER QL IA: POSITIVE

## 2024-09-16 NOTE — PROGRESS NOTES
Chief Complaint: Patient presents for kidney transplant evaluation    History of Present Illness:  Luis Panda  is a 59 y.o. female presents with ESRD from polycystic kidney disease.    She speaks Slovenian and was accompanied by family members today.    She has been on our list as status 7 for approximately 2 years.  Was made status 7 for thrombocytopenia.  She has since seen hematology and was started on Promacta with improvement in her platelets.  Have a bone marrow biopsy completed 2/28/24 and this was consistent with ITP.    She has a calcium score 4/2022 of 57.  Her prior stress test and echo are out of date but just barely by 1 month.  They were normal.    Of note she does have cervical radiculopathy for which she follows with orthopedics.  At this time she is not undergoing surgery however may reassess following transplantation.  Has gotten injections in the past.    A MRA head which is negative for aneurysms.    Hemodialysis: Currently preemptive to dialysis   ROS: Normal urine output, no urinary retention/hematuria/recurrent UTIs/nephrolithiasis.   Disease Etiology: Polycystic kidney disease  Disease Complications:  hypertension.   Prior Malignancy: No    Past Medical History:  CKD stage V  Polycystic kidney disease  ITP  Cervical radiculopathy    Past Surgical History:  None    Social History:  Denies smoking or drinking    Review of Systems:  Cardiac: Denies chest pain, palpitations  : Normal urine output. Denies history of gross hematuria, nephrolithiasis, urinary retention, or recurrent UTIs.  Vascular: Denies personal or familial history of DVT/PE. No active claudication or non-healing LE wounds.  Functional Status: Can walk up 2 flights of stairs  Prior transplant: Not applicable    Physical Exam:  Gen: A+OX3; NAD  HEENT: PERRL, sclera anicteric, MMM  Cardiac: RRR  Chest: Normal inspiratory effort  Abdomen: S/NT/ND.  Ext: No LE edema  Vascular: 2+ palpable femoral  pulses  Psychiatric: Normal mood, affect    Assessment/Plan:    I believe this patient can be rediscussed at selection committee for reactivation.  She is currently preemptive, ABO B with a PRA of 0.  During selection discussion would like to discuss her Promacta use for her ITP.  Currently takes every other day.  Given possible risk for thrombosis may consider holding therapy in perioperative period?  Her cardiac workup is minimally out of date, with last imaging 8/2023.  Will order updated stress and echo, however given her low risk and normal studies previously I believe we can proceed with reactivation at this time and not wait for updated imaging.    Time Attestation:  I spent 60 minutes with the patient, over 50 minutes in counseling and education as outlined above.    Gina Adkins MD

## 2024-09-19 ENCOUNTER — DOCUMENTATION (OUTPATIENT)
Dept: TRANSPLANT | Facility: HOSPITAL | Age: 59
End: 2024-09-19
Payer: MEDICAID

## 2024-09-20 ENCOUNTER — COMMITTEE REVIEW (OUTPATIENT)
Dept: TRANSPLANT | Facility: HOSPITAL | Age: 59
End: 2024-09-20
Payer: MEDICAID

## 2024-09-20 ENCOUNTER — TELEPHONE (OUTPATIENT)
Dept: TRANSPLANT | Facility: HOSPITAL | Age: 59
End: 2024-09-20
Payer: MEDICAID

## 2024-09-20 DIAGNOSIS — Z01.818 PRE-TRANSPLANT EVALUATION FOR KIDNEY TRANSPLANT: ICD-10-CM

## 2024-09-20 NOTE — COMMITTEE REVIEW
Presentation for Listing Evaluation Date: 3/15/2022  Committee Review Date: 9/19/2024    Organ being evaluated for: Kidney    Transplant Phase: Waitlist  Transplant Status: Inactive    Referring Physician:      Primary Diagnosis: Polycystic Kidneys  Secondary Diagnosis:     Committee Review Decision: Make Active      Committee Discussion Details:   The candidate's evaluation was presented and discussed at the Kidney  Multidisciplinary Selection Conference. After review of the candidate's diagnosis and the evaluations of the multidisciplinary team members, it was the consensus of the Selection Committee that the candidate does meet Kidney Selection Criteria and is Active for Kidney transplant.    Pt will get an updated HLA sample.

## 2024-09-20 NOTE — LETTER
September 20, 2024    Luis Panda  [address] [place] [address]      Dear Ms. Panda:    Our multi-disciplinary transplant team completed a review of your medical records on 9/19/2024.  I am pleased to inform you that you will be re-activated on the [employer] Organ [facility] (UNOS) waiting list for a Kidney transplant.    Our transplant program consists of surgeons and medical doctors who provide coverage 365 days a year, 24 hours a day.     If you have any questions or concerns regarding your insurance coverage or billing issues, a  is available to speak with you.     It is important to keep us updated of any major changes in your medical condition, contact information and health insurance coverage.     Please don't hesitate to contact us at Dept: [phone] with any questions or concerns. We look forward to working with you through this process.      Sincerely,      Elvira Cates RN          The UNOS Toll-free Patient Services Line:  Your Resource for Organ Transplant Information    If you have a question regarding your own medical care, you always should call your transplant hospital first. However, for general organ transplant-related information, you should call the [employer] Organ [facility] (UNOS) toll-free patient services line at [phone].  Anyone, including potential transplant candidates, candidates, recipients, family members, friends, living donors, and donor family members, can call this number to:    Talk about organ donation, living donation, the transplant process, the donation process, and transplant policies.  Get a free patient information kit with helpful booklets, waiting list and transplant information, and a list of all transplant hospitals.  Ask questions about the Organ Procurement and Transplantation Network (OPTN) web site (http://optn.transplant.hrsa.gov/), the UNOS Web site (http://unos.org/), or the UNOS web site for living  donors and transplant recipients. (http://www.transplantliving.org/).  Learn how [facility] and the OPTN can help you.  Talk about any concerns that you may have with a transplant hospital.    [employer] is a not-for-profit organization that provides the administrative services for the national OPTN under federal contract to the Health Resources and Services Administration (HRSA), an agency under the U.S. Department of Health and Human Services (HHS).    [facility] and the OPTN are responsible for:    Providing educational material for patients, the public, and professionals.  Raising awareness of the need for donated organs and tissue.  Writing organ transplant policy with help from transplant professionals, transplant patients, transplant candidates, donor families, living donors, and the public.  Coordinating organ procurement, matching, and placement.  Collecting information about every organ transplant and donation that occurs in the [place].    Remember, you should contact your transplant hospital directly if you have questions or concerns about your own medical care including medical records, work-up progress, and test results.    [facility] is not your transplant hospital, and staff at [facility] will not be able to transfer you to your transplant hospital, so keep your transplant hospital’s phone number handy.    However, while you research your transplant needs and learn as much as you can about transplantation and donation, we welcome your call to our toll-free patient services line at [phone].      [facility] PIL Final Rev 1-

## 2024-09-20 NOTE — TELEPHONE ENCOUNTER
Talked to Bertram, let him know his mom needed a new HLA sample and then we would be able to activate her on the list.    He will take her to the lab Monday and let me know when this has been done.

## 2024-09-23 ENCOUNTER — LAB (OUTPATIENT)
Dept: LAB | Facility: LAB | Age: 59
End: 2024-09-23
Payer: MEDICAID

## 2024-09-23 DIAGNOSIS — Z01.818 PRE-TRANSPLANT EVALUATION FOR KIDNEY TRANSPLANT: ICD-10-CM

## 2024-09-24 ENCOUNTER — DOCUMENTATION (OUTPATIENT)
Dept: TRANSPLANT | Facility: HOSPITAL | Age: 59
End: 2024-09-24
Payer: MEDICAID

## 2024-09-24 ENCOUNTER — LAB REQUISITION (OUTPATIENT)
Dept: LAB | Facility: CLINIC | Age: 59
End: 2024-09-24
Payer: MEDICAID

## 2024-09-24 DIAGNOSIS — N18.6 END STAGE RENAL DISEASE (MULTI): ICD-10-CM

## 2024-09-24 LAB
FLOW AUTOCROSSMATCH: NORMAL
HLA RESULTS: NORMAL
HLA-A+B+C AB NFR SER: NORMAL %
HLA-DP+DQ+DR AB NFR SER: NORMAL %

## 2024-09-24 PROCEDURE — 86832 HLA CLASS I HIGH DEFIN QUAL: CPT | Mod: OUT | Performed by: SURGERY

## 2024-09-24 PROCEDURE — 86825 HLA X-MATH NON-CYTOTOXIC: CPT | Mod: OUT | Performed by: SURGERY

## 2024-09-25 LAB
FLOW AUTOCROSSMATCH: NORMAL
HLA RESULTS: NORMAL
HLA RESULTS: NORMAL
HLA-A+B+C AB NFR SER: NORMAL %
HLA-DP+DQ+DR AB NFR SER: NORMAL %

## 2024-09-26 ENCOUNTER — SPECIALTY PHARMACY (OUTPATIENT)
Dept: PHARMACY | Facility: CLINIC | Age: 59
End: 2024-09-26

## 2024-09-26 PROCEDURE — RXMED WILLOW AMBULATORY MEDICATION CHARGE

## 2024-09-27 ENCOUNTER — PHARMACY VISIT (OUTPATIENT)
Dept: PHARMACY | Facility: CLINIC | Age: 59
End: 2024-09-27
Payer: MEDICAID

## 2024-09-27 ENCOUNTER — TELEPHONE (OUTPATIENT)
Dept: TRANSPLANT | Facility: HOSPITAL | Age: 59
End: 2024-09-27
Payer: MEDICAID

## 2024-09-30 ENCOUNTER — APPOINTMENT (OUTPATIENT)
Dept: CARDIOLOGY | Facility: HOSPITAL | Age: 59
End: 2024-09-30
Payer: MEDICAID

## 2024-10-04 ENCOUNTER — HOSPITAL ENCOUNTER (OUTPATIENT)
Dept: CARDIOLOGY | Facility: CLINIC | Age: 59
Discharge: HOME | End: 2024-10-04
Payer: MEDICAID

## 2024-10-04 ENCOUNTER — HOSPITAL ENCOUNTER (OUTPATIENT)
Dept: RADIOLOGY | Facility: CLINIC | Age: 59
Discharge: HOME | End: 2024-10-04
Payer: MEDICAID

## 2024-10-04 DIAGNOSIS — Z01.818 PRE-TRANSPLANT EVALUATION FOR KIDNEY TRANSPLANT: ICD-10-CM

## 2024-10-04 PROCEDURE — 93016 CV STRESS TEST SUPVJ ONLY: CPT | Performed by: INTERNAL MEDICINE

## 2024-10-04 PROCEDURE — 2500000004 HC RX 250 GENERAL PHARMACY W/ HCPCS (ALT 636 FOR OP/ED): Mod: SE | Performed by: SURGERY

## 2024-10-04 PROCEDURE — A9502 TC99M TETROFOSMIN: HCPCS | Performed by: SURGERY

## 2024-10-04 PROCEDURE — 3430000001 HC RX 343 DIAGNOSTIC RADIOPHARMACEUTICALS: Performed by: SURGERY

## 2024-10-04 PROCEDURE — 93017 CV STRESS TEST TRACING ONLY: CPT

## 2024-10-04 PROCEDURE — 93018 CV STRESS TEST I&R ONLY: CPT | Performed by: INTERNAL MEDICINE

## 2024-10-04 PROCEDURE — 78452 HT MUSCLE IMAGE SPECT MULT: CPT

## 2024-10-04 RX ORDER — AMINOPHYLLINE 25 MG/ML
125 INJECTION, SOLUTION INTRAVENOUS ONCE AS NEEDED
Status: DISCONTINUED | OUTPATIENT
Start: 2024-10-04 | End: 2024-10-05 | Stop reason: HOSPADM

## 2024-10-04 RX ORDER — REGADENOSON 0.08 MG/ML
0.4 INJECTION, SOLUTION INTRAVENOUS
Status: COMPLETED | OUTPATIENT
Start: 2024-10-04 | End: 2024-10-04

## 2024-10-07 ENCOUNTER — APPOINTMENT (OUTPATIENT)
Dept: RADIOLOGY | Facility: HOSPITAL | Age: 59
End: 2024-10-07
Payer: MEDICAID

## 2024-10-07 ENCOUNTER — APPOINTMENT (OUTPATIENT)
Dept: CARDIOLOGY | Facility: HOSPITAL | Age: 59
End: 2024-10-07
Payer: MEDICAID

## 2024-10-10 RX ORDER — CALCIUM CARBONATE/VITAMIN D3 250-3.125
TABLET ORAL
COMMUNITY
Start: 2024-09-25

## 2024-10-10 RX ORDER — ASPIRIN 81 MG/1
TABLET ORAL
COMMUNITY
Start: 2024-09-25

## 2024-10-11 ENCOUNTER — HOSPITAL ENCOUNTER (OUTPATIENT)
Dept: RADIOLOGY | Facility: CLINIC | Age: 59
Discharge: HOME | End: 2024-10-11
Payer: MEDICAID

## 2024-10-11 ENCOUNTER — CONSULT (OUTPATIENT)
Dept: CARDIOLOGY | Facility: CLINIC | Age: 59
End: 2024-10-11
Payer: MEDICAID

## 2024-10-11 VITALS
BODY MASS INDEX: 29.98 KG/M2 | WEIGHT: 152.7 LBS | HEIGHT: 60 IN | OXYGEN SATURATION: 98 % | SYSTOLIC BLOOD PRESSURE: 168 MMHG | HEART RATE: 72 BPM | DIASTOLIC BLOOD PRESSURE: 79 MMHG

## 2024-10-11 VITALS — BODY MASS INDEX: 26.95 KG/M2 | HEIGHT: 63 IN | WEIGHT: 152.12 LBS

## 2024-10-11 DIAGNOSIS — Z01.818 PRE-TRANSPLANT EVALUATION FOR KIDNEY TRANSPLANT: ICD-10-CM

## 2024-10-11 DIAGNOSIS — I10 ESSENTIAL HYPERTENSION: Primary | ICD-10-CM

## 2024-10-11 LAB
ATRIAL RATE: 78 BPM
P AXIS: 57 DEGREES
P OFFSET: 196 MS
P ONSET: 148 MS
PR INTERVAL: 142 MS
Q ONSET: 219 MS
QRS COUNT: 13 BEATS
QRS DURATION: 80 MS
QT INTERVAL: 384 MS
QTC CALCULATION(BAZETT): 437 MS
QTC FREDERICIA: 419 MS
R AXIS: 8 DEGREES
T AXIS: 80 DEGREES
T OFFSET: 411 MS
VENTRICULAR RATE: 78 BPM

## 2024-10-11 PROCEDURE — 77067 SCR MAMMO BI INCL CAD: CPT

## 2024-10-11 PROCEDURE — 99214 OFFICE O/P EST MOD 30 MIN: CPT | Performed by: INTERNAL MEDICINE

## 2024-10-11 PROCEDURE — 93005 ELECTROCARDIOGRAM TRACING: CPT | Performed by: INTERNAL MEDICINE

## 2024-10-11 ASSESSMENT — PATIENT HEALTH QUESTIONNAIRE - PHQ9
2. FEELING DOWN, DEPRESSED OR HOPELESS: NOT AT ALL
SUM OF ALL RESPONSES TO PHQ9 QUESTIONS 1 AND 2: 0
1. LITTLE INTEREST OR PLEASURE IN DOING THINGS: NOT AT ALL

## 2024-10-11 ASSESSMENT — COLUMBIA-SUICIDE SEVERITY RATING SCALE - C-SSRS
6. HAVE YOU EVER DONE ANYTHING, STARTED TO DO ANYTHING, OR PREPARED TO DO ANYTHING TO END YOUR LIFE?: NO
1. IN THE PAST MONTH, HAVE YOU WISHED YOU WERE DEAD OR WISHED YOU COULD GO TO SLEEP AND NOT WAKE UP?: NO
2. HAVE YOU ACTUALLY HAD ANY THOUGHTS OF KILLING YOURSELF?: NO

## 2024-10-11 ASSESSMENT — PAIN SCALES - GENERAL: PAINLEVEL: 0-NO PAIN

## 2024-10-11 NOTE — PROGRESS NOTES
Primary Care Physician: Francisco Javier Parmar DO  Date of Visit: 10/11/2024 10:20 AM EDT  [place] [facility] [place] [address]     Chief Complaint:   No chief complaint on file.       HPI / Summary:   Luis Panda is a 59 y.o. female presents for cardiology preoperative risk evaluation    History of PCKD, thrombocytopenia   pharmacologic stress scintigraphy normal perfusion and 65% EF  @022 normal stress nuclear as well.  2023 echocardiogram hyperdynamic LV contractility concentric remodeling no other path    DM 2, HTN. DL  Calcium score 2022 57, left main 10, LAD 31, circumflex 11, RCA 6    [place]  Can walk 20 minutes  No tobacco  DM 6 years    No h/o DVT, MI, CVA, HF, rhythm disorder, no Ca, no liver disease  MRA brain  normal  No loc, no passing out, dizziiness.    TB   7 children, 3 .  Specialty Problems          Cardiovascular    Essential hypertension    Arteriovenous fistula (CMS-HCC)    Mixed hyperlipidemia    Platelets decreased (CMS-[facility])        Past Medical History:   Diagnosis Date    CKD (chronic kidney disease)     Diabetes mellitus (Multi)     HLD (hyperlipidemia)     Hypertension           Past Surgical History:   Procedure Laterality Date    AV FISTULA PLACEMENT Left     approx 2019    MR HEAD ANGIO WO IV CONTRAST  2023    MR HEAD ANGIO WO IV CONTRAST 2023 [facility] [id number] MRI          Social History:   reports that she quit smoking about 39 years ago. Her smoking use included cigarettes. She has never been exposed to tobacco smoke. She has never used smokeless tobacco. She reports that she does not currently use alcohol. She reports that she does not use drugs.      Allergies:  Allergies   Allergen Reactions    Amlodipine Unknown     LEG SWELLING    Oxycodone Unknown     Dizziness, weakness       Outpatient Medications:  Current Outpatient Medications   Medication Instructions    aspirin 81 mg EC tablet     calcitriol (ROCALTROL) 0.25 mcg, oral, Daily     calcium carbonate-vitamin D3 (Oyster Shell) 250 mg-3.125 mcg (125 unit) tablet     carvedilol (COREG) 25 mg, oral, 2 times daily (morning and late afternoon)    cholecalciferol (VITAMIN D-3) 50 mcg, oral, Daily    ferrous sulfate, 325 mg ferrous sulfate, tablet 1 tablet, oral, 3 times daily    glipiZIDE (GLUCOTROL) 5 mg, oral, Daily    hydrALAZINE (APRESOLINE) 25 mg, oral, 3 times daily    omeprazole (PRILOSEC) 40 mg, oral, Daily RT    ondansetron ODT (ZOFRAN-ODT) 4 mg, Every 6 hours    Promacta 12.5 mg, oral, Daily before breakfast, Take on an empty stomach, 1 hour before or 2 hours afer a meal.    simvastatin (Zocor) 20 mg tablet 1 tablet, oral, Daily    sodium bicarbonate 650 mg, oral    traZODone (Desyrel) 50 mg tablet 1 tablet, oral, Nightly PRN    TRUEplus Lancets 33 gauge misc        ROS     Physical Exam:  There were no vitals filed for this visit.  Wt Readings from Last 5 Encounters:   09/13/24 69.9 kg (154 lb 1.6 oz)   09/09/24 69.3 kg (152 lb 12.5 oz)   08/26/24 69.7 kg (153 lb 12.3 oz)   06/03/24 70.7 kg (155 lb 12.1 oz)   02/28/24 72.6 kg (160 lb)     There is no height or weight on file to calculate BMI.   She is well-appearing.  Flat JVP.  Normal carotid upstrokes.  Rhythm is regular.  Soft systolic murmur.  No diastolic murmur or gallop.  Clear lungs.  Soft abdomen.  No dependent edema with intact pedal pulses     Last Labs:  CMP:  Recent Labs     09/09/24  1137 08/26/24  1041 01/10/24  1416 12/09/22  1510 03/15/22  1300    140 138 142  --    K 4.1 4.6 3.7 3.9  --    * 112* 110* 112*  --    CO2 19* 18* 18* 21  --    ANIONGAP 14 15 14 13  --    BUN 44*  41* 40* 46* 53* 39*   CREATININE 6.18*  5.85* 6.30* 5.58* 5.35* 4.97*   EGFR 7*  8* 7* 8*  --   --    GLUCOSE 109* 93 212* 111*  --      Recent Labs     09/09/24  1137 08/26/24  1041 01/10/24  1416 12/09/22  1510 03/15/22  1300   ALBUMIN 3.8 3.8 3.9 4.0 3.9   ALKPHOS 63 59 67 66 67   ALT 17 22 19 21 21   AST 21 22 20 22 23   BILITOT 0.6  0.8 0.6 0.4 0.4     CBC:  Recent Labs     09/09/24  1137 08/26/24  1038 06/03/24  1118 02/28/24  0830 01/10/24  1533 01/10/24  1416   WBC 6.1 6.8 5.5 6.3  6.3  --  9.5   HGB 9.1* 9.1* 9.0* 8.8*  8.8*  --  9.4*   HCT 28.0* 28.8* 27.8* 27.0*  27.0*  --  30.1*    307 80* 52*  52* 76* 89*   MCV 98 101* 100 100  100  --  105*     COAG:   Recent Labs     01/10/24  1526 03/15/22  1300   INR 1.1 1.0     HEME/ENDO:  Recent Labs     01/10/24  1416 12/09/22  1510 03/28/22  1344 03/15/22  1300 08/18/21  0954   FERRITIN 557*  --   --   --   --    IRONSAT 31  --   --   --   --    HGBA1C  --  5.4 5.5 5.4 6.2*      CARDIAC:   Recent Labs     01/10/24  1416        Recent Labs     09/09/24  1137   CHOL 104   HDL 45.5       Last Cardiology Tests:  ECG:    Echo:  Echo Results:  No results found for this or any previous visit from the past 3650 days.       Cath:      Stress Test:  Stress Results:  Regadenoson Stress Test With Myocardial Perfusion Spect (Single Study) 10/04/2024    Narrative  Interpreted By:  Presley Silva and Maltbie Grace  STUDY:  NUCLEAR STRESS TEST;  10/4/2024 10:03 am    INDICATION:  Signs/Symptoms:kidney annual waitlist testing.  ,Z01.818 Encounter for other preprocedural examination    COMPARISON:  None.    ACCESSION NUMBER(S):  [id number]    ORDERING CLINICIAN:  CHELE LUGO    TECHNIQUE:  DIVISION OF NUCLEAR MEDICINE  PHARMACOLOGIC STRESS MYOCARDIAL PERFUSION SCAN, ONE DAY PROTOCOL    The patient received an intravenous dose of  10.0 mCi of Tc-99m  Myoview and resting emission tomographic (SPECT) images of the  myocardium were acquired. The patient then received an intravenous  infusion of 0.4mg regadenoson (Lexiscan)  followed by an additional  dose of  31.0 mCi of Tc-99m  Myoview. Stress phase SPECT images of  the myocardium were then acquired. These included ECG-gated images to  assess and quantify ventricular function.    FINDINGS:  Both stress and rest studies demonstrate grossly  normal perfusion  throughout the left ventricle.    The left ventricle is normal in size.    Gated images demonstrate normal LV wall motion with a post-stress LV  EF estimated at greater than 65%.    Impression  1. No evidence of inducible myocardial ischemia or prior infarction.  2. The left ventricle is normal in size.  3. Normal LV wall motion with a post-stress LV EF estimated at  greater than 65%.      I personally reviewed the images/study and I agree with the findings  as stated by Soila Mendoza MD. This study was interpreted at  [place], [place].    MACRO:  None    Signed by: Presley Silva 10/4/2024 10:45 AM  Dictation workstation:   [id number]         Cardiac Imaging:  Cardiology Interpretation Of Nuclear Stress - See Other Report For Nuclear Portion      [facility] [address]                       Tel [phone] and Fax [phone]    Nuclear Pharmacologic Stress Test    Patient Name:     [facility] Isaiaskarma     Ordering Provider:   [id number] CHELE LUGO  Study Date:       10/4/2024          Reading Physician:   [id number] Cliff Guillory MD  MRN/PID:          [id number]           Supervising          [id number] Cliff Guillory MD                                       Physician:  Accession#:       [id number]       Fellow:  Date of           1965 / 59      Fellow:  Birth/Age:        years  Gender:           F                  Nurse:               Concepción Dallas RN  Admit Date:       10/4/2024          Technician:          Jennifer Villalobos  Admission Status: Outpatient         Sonographer:         tyson  Height:           152.40 cm          Technologist:  Weight:           69.85 kg           Additional Staff:  BSA:              1.67 m2            Encounter#:          [id number]  BMI:              30.08 kg/m2        Patient  Location:    [facility]    Study Type:    CARDIOLOGY INTERPRETATION OF NUCLEAR STRESS  Diagnosis/ICD: Encounter for other preprocedural examination-Z01.818  Indication:    pre transplant  CPT Code:      Stress Test Interpretation-82584; Stress Test Supervision-90944    Falls Risk: Low: Patient has a low risk for sustaining a fall; enviromental safety interventions in place.     Study Details: Correct procedure and correct patient verified verbally and with                 ID Band checked.       Patient History: . ESRD, pre-transplant.  Allergies: Amlodipine, oxycodone.  Smoker:    Never.  Diabetes:  Yes.       Medications: The patient's prescribed medication is coreg, vitamin D, promacta, ferrous sulfate, glipizide, hydralazine, omeprazole, simvastatin. The patient took medications as prescribed.     Patient Performance: Patient received a total of 0.4 mg of Regadenoson at 9:01:36 AM. Patient received a total of 31.0 mCi of Myoview at 9:01:55 AM. The peak heart rate achieved was 108 bpm, which was 68 % of the age predicted target heart rate of 158 bpm. The resting blood pressure was 183/81 mmHg with a heart rate of 82 bpm. The patient developed atypical chest pain during the stress exam. The blood pressure response was normal. The test was terminated due to: completed lab protocol.     Baseline ECG: Resting ECG showed normal sinus rhythm with nonspecific ST-T wave changes.     Stress ECG: Stress ECG showed normal sinus rhythm, with nonspecific ST-T changes.     Stress Stage Data:  +---+------+-------+----------------------+  HR Sys BPDias BPComments                +---+------+-------+----------------------+  82 183   81                             +---+------+-------+----------------------+  [id number]   67     lexiscan IV push        +---+------+-------+----------------------+  [id number]   72     c/o chest pain and SOB  +---+------+-------+----------------------+       Recovery ECG: Recovery  ECG showed normal sinus rhythm, with no abnormal findings and nonspecific ST-T changes.     +------------+---+------+-------+---------------------------------------------+              HR Sys BPDias BPComments                                       +------------+---+------+-------+---------------------------------------------+  Recovery I  [id number]   74     1 min chest pain remains                       +------------+---+------+-------+---------------------------------------------+  Recovery II 96 155   73     2 min chest pain resolving                     +------------+---+------+-------+---------------------------------------------+  Recovery III94 152   71     4 min resolved, co nausea AMINOPHYLLINE GIVEN  +------------+---+------+-------+---------------------------------------------+       Summary:   1. No clinical or electrocardiographic evidence for ischemia at a maximal infusion.   2. Nuclear image results are reported separately.    [id number] Cliff Guillory MD  Electronically signed on 10/4/2024 at 3:31:37 PM            ** Final **  Nuclear Stress Test  Narrative: Interpreted By:  Presley Silva and Maltbie Grace   STUDY:  NUCLEAR STRESS TEST;  10/4/2024 10:03 am      INDICATION:  Signs/Symptoms:kidney annual waitlist testing.  ,Z01.818 Encounter for other preprocedural examination      COMPARISON:  None.      ACCESSION NUMBER(S):  [id number]      ORDERING CLINICIAN:  CHELE LUGO      TECHNIQUE:  DIVISION OF NUCLEAR MEDICINE  PHARMACOLOGIC STRESS MYOCARDIAL PERFUSION SCAN, ONE DAY PROTOCOL      The patient received an intravenous dose of  10.0 mCi of Tc-99m  Myoview and resting emission tomographic (SPECT) images of the  myocardium were acquired. The patient then received an intravenous  infusion of 0.4mg regadenoson (Lexiscan)  followed by an additional  dose of  31.0 mCi of Tc-99m  Myoview. Stress phase SPECT images of  the myocardium were then acquired. These included ECG-gated  images to  assess and quantify ventricular function.      FINDINGS:  Both stress and rest studies demonstrate grossly normal perfusion  throughout the left ventricle.      The left ventricle is normal in size.      Gated images demonstrate normal LV wall motion with a post-stress LV  EF estimated at greater than 65%.      Impression: 1. No evidence of inducible myocardial ischemia or prior infarction.  2. The left ventricle is normal in size.  3. Normal LV wall motion with a post-stress LV EF estimated at  greater than 65%.          I personally reviewed the images/study and I agree with the findings  as stated by Soila Mendoza MD. This study was interpreted at  [place], [place].      MACRO:  None      Signed by: Presley Silva 10/4/2024 10:45 AM  Dictation workstation:   [id number]        Assessment/Plan     59-year-old female with HTN, DM2, dyslipidemia, PKD, being seen in preoperative risk evaluation anticipation of renal transplant.  Recent cardiac imaging results were reviewed.  Calcium score 2 years ago was mildly elevated.  Reasonable functional capacity.  No acute EKG changes on surface ECG.  No adverse cardiovascular findings on exam.  Her clinical evaluation is a bit limited due to her not being an English speaker her son did provide translation services.  Her other history includes thrombocytopenia and anemia.  She was treated for TB in the 80s.  Based on my clinical assessment I see no clear cardiac contraindication for transplant I would assess her cardiovascular risk is being low and acceptable.  Thank you for allowing me to participate in her care      Orders:  No orders of the defined types were placed in this encounter.     Followup Appts:  Future Appointments   Date Time Provider Department Center   10/11/2024  1:00 PM [place] [address] MAMMO [id number] [facility]   10/14/2024 11:30 AM Karina Mendoza PA-C [id number] [facility]            ____________________________________________________________  Korey Martinez MD    Senior Attending Physician  [facility]  [facility]

## 2024-10-14 ENCOUNTER — OFFICE VISIT (OUTPATIENT)
Dept: HEMATOLOGY/ONCOLOGY | Facility: CLINIC | Age: 59
End: 2024-10-14
Payer: MEDICAID

## 2024-10-14 VITALS
HEART RATE: 79 BPM | TEMPERATURE: 97.2 F | BODY MASS INDEX: 26.99 KG/M2 | WEIGHT: 152.34 LBS | OXYGEN SATURATION: 100 % | SYSTOLIC BLOOD PRESSURE: 153 MMHG | RESPIRATION RATE: 17 BRPM | DIASTOLIC BLOOD PRESSURE: 82 MMHG

## 2024-10-14 DIAGNOSIS — D69.6 PLATELETS DECREASED (CMS-HCC): Primary | ICD-10-CM

## 2024-10-14 DIAGNOSIS — D64.9 ANEMIA: ICD-10-CM

## 2024-10-14 DIAGNOSIS — D69.6 THROMBOCYTOPENIA (CMS-HCC): ICD-10-CM

## 2024-10-14 LAB
ALBUMIN SERPL BCP-MCNC: 3.7 G/DL (ref 3.4–5)
ALP SERPL-CCNC: 69 U/L (ref 33–136)
ALT SERPL W P-5'-P-CCNC: 27 U/L (ref 7–45)
ANION GAP SERPL CALC-SCNC: 13 MMOL/L (ref 10–20)
AST SERPL W P-5'-P-CCNC: 24 U/L (ref 9–39)
BASOPHILS # BLD AUTO: 0.03 X10*3/UL (ref 0–0.1)
BASOPHILS NFR BLD AUTO: 0.6 %
BILIRUB SERPL-MCNC: 0.5 MG/DL (ref 0–1.2)
BUN SERPL-MCNC: 50 MG/DL (ref 6–23)
CALCIUM SERPL-MCNC: 8.9 MG/DL (ref 8.6–10.3)
CHLORIDE SERPL-SCNC: 109 MMOL/L (ref 98–107)
CO2 SERPL-SCNC: 21 MMOL/L (ref 21–32)
CREAT SERPL-MCNC: 6.49 MG/DL (ref 0.5–1.05)
EGFRCR SERPLBLD CKD-EPI 2021: 7 ML/MIN/1.73M*2
EOSINOPHIL # BLD AUTO: 0.13 X10*3/UL (ref 0–0.7)
EOSINOPHIL NFR BLD AUTO: 2.4 %
ERYTHROCYTE [DISTWIDTH] IN BLOOD BY AUTOMATED COUNT: 13.4 % (ref 11.5–14.5)
GLUCOSE SERPL-MCNC: 106 MG/DL (ref 74–99)
HCT VFR BLD AUTO: 27.9 % (ref 36–46)
HGB BLD-MCNC: 9.1 G/DL (ref 12–16)
IMM GRANULOCYTES # BLD AUTO: 0 X10*3/UL (ref 0–0.7)
IMM GRANULOCYTES NFR BLD AUTO: 0 % (ref 0–0.9)
LYMPHOCYTES # BLD AUTO: 0.99 X10*3/UL (ref 1.2–4.8)
LYMPHOCYTES NFR BLD AUTO: 18.4 %
MCH RBC QN AUTO: 32.6 PG (ref 26–34)
MCHC RBC AUTO-ENTMCNC: 32.6 G/DL (ref 32–36)
MCV RBC AUTO: 100 FL (ref 80–100)
MONOCYTES # BLD AUTO: 0.45 X10*3/UL (ref 0.1–1)
MONOCYTES NFR BLD AUTO: 8.3 %
NEUTROPHILS # BLD AUTO: 3.79 X10*3/UL (ref 1.2–7.7)
NEUTROPHILS NFR BLD AUTO: 70.3 %
PLATELET # BLD AUTO: 125 X10*3/UL (ref 150–450)
POTASSIUM SERPL-SCNC: 3.9 MMOL/L (ref 3.5–5.3)
PROT SERPL-MCNC: 6.9 G/DL (ref 6.4–8.2)
RBC # BLD AUTO: 2.79 X10*6/UL (ref 4–5.2)
SODIUM SERPL-SCNC: 139 MMOL/L (ref 136–145)
WBC # BLD AUTO: 5.4 X10*3/UL (ref 4.4–11.3)

## 2024-10-14 PROCEDURE — 36415 COLL VENOUS BLD VENIPUNCTURE: CPT | Performed by: PHYSICIAN ASSISTANT

## 2024-10-14 PROCEDURE — 85025 COMPLETE CBC W/AUTO DIFF WBC: CPT | Performed by: PHYSICIAN ASSISTANT

## 2024-10-14 PROCEDURE — 99213 OFFICE O/P EST LOW 20 MIN: CPT | Performed by: PHYSICIAN ASSISTANT

## 2024-10-14 PROCEDURE — 80053 COMPREHEN METABOLIC PANEL: CPT | Performed by: PHYSICIAN ASSISTANT

## 2024-10-14 PROCEDURE — 3079F DIAST BP 80-89 MM HG: CPT | Performed by: PHYSICIAN ASSISTANT

## 2024-10-14 PROCEDURE — 3077F SYST BP >= 140 MM HG: CPT | Performed by: PHYSICIAN ASSISTANT

## 2024-10-14 SDOH — ECONOMIC STABILITY: FOOD INSECURITY: WITHIN THE PAST 12 MONTHS, YOU WORRIED THAT YOUR FOOD WOULD RUN OUT BEFORE YOU GOT THE MONEY TO BUY MORE.: NEVER TRUE

## 2024-10-14 SDOH — ECONOMIC STABILITY: FOOD INSECURITY: WITHIN THE PAST 12 MONTHS, THE FOOD YOU BOUGHT JUST DIDN'T LAST AND YOU DIDN'T HAVE MONEY TO GET MORE.: NEVER TRUE

## 2024-10-14 ASSESSMENT — PAIN SCALES - GENERAL: PAINLEVEL: 0-NO PAIN

## 2024-10-14 NOTE — PROGRESS NOTES
Reason for Visit  Luis Panda is a 59 y.o. female w/PMH s/f polycystic kidney disease referred by Dr. Luciano for thrombocytopenia prior to being  placed on kidney transplant list.     Upon review of labs, noted to have thrombocytopenia since 2021 in the 's. Normocytic-macrocytic anemia. Normal WBC.    Patient presents with son and . Translation done by son. On assessment, reports long standing h/o constipation and chronic shoulder pain otherwise feeling well. Denies bleeding or bruising.     PMH/PSH: PKD, DMII, HTN, HL, fistula left arm.  FH: Denies FH of cancers, bleeding or clotting disorder.   Soc Hx: former smoker, denies ETOH, illicit drugs; , 4 children.    History of Present Illness:  Patient presents for follow up accompanied by grandson and . Patient on Promacta 12.5 mg every other day, tolerating well. On assessment, patient feeling well and denies any bleeding or excessive bruising.     Review of Systems: All of the systems have been reviewed and are negative for complaints except what is stated in the HPI and/or past medical history.    Allergies and Intolerances:  Allergies   Allergen Reactions    Amlodipine Unknown     LEG SWELLING    Oxycodone Unknown     Dizziness, weakness            Current Outpatient Medications   Medication Sig Dispense Refill    aspirin 81 mg EC tablet       calcitriol (Rocaltrol) 0.25 mcg capsule Take 1 capsule (0.25 mcg) by mouth once daily.      calcium carbonate-vitamin D3 (Oyster Shell) 250 mg-3.125 mcg (125 unit) tablet       carvedilol (Coreg) 25 mg tablet Take 1 tablet (25 mg) by mouth 2 times a day with meals.      cholecalciferol (Vitamin D-3) 50 MCG (2000 UT) tablet Take 1 tablet (50 mcg) by mouth once daily.      eltrombopag olamine (Promacta) 12.5 mg tablet Take 1 tablet (12.5 mg) by mouth once daily in the morning. Take before meals. Take on an empty stomach, 1 hour before or 2 hours afer a meal. 30 tablet 3    ferrous sulfate,  "325 mg ferrous sulfate, tablet Take 1 tablet by mouth 3 times a day.      glipiZIDE (Glucotrol) 5 mg tablet Take 1 tablet (5 mg) by mouth once daily.      hydrALAZINE (Apresoline) 25 mg tablet Take 1 tablet (25 mg) by mouth 3 times a day.      omeprazole (PriLOSEC) 40 mg DR capsule Take 1 capsule (40 mg) by mouth once daily.      ondansetron ODT (Zofran-ODT) 4 mg disintegrating tablet 1 tablet (4 mg) every 6 hours.      simvastatin (Zocor) 20 mg tablet Take 1 tablet (20 mg) by mouth once daily.      sodium bicarbonate 650 mg tablet Take 1 tablet (650 mg) by mouth.      traZODone (Desyrel) 50 mg tablet Take 1 tablet (50 mg) by mouth as needed at bedtime for sleep.      TRUEplus Lancets 33 gauge misc        No current facility-administered medications for this visit.        Vitals and Measurements:       8/26/2024    10:37 AM 9/9/2024    11:14 AM 9/13/2024     9:11 AM 10/11/2024    10:45 AM 10/11/2024    12:56 PM 10/11/2024     1:01 PM 10/14/2024    11:55 AM   Vitals   Systolic 167 184 193 168   153   Diastolic 84 69 80 79   82   Heart Rate 76 79 73 72   79   Temp 36.6 °C (97.9 °F) 36 °C (96.8 °F) 36.3 °C (97.3 °F)    36.2 °C (97.2 °F)   Resp 17 16     17   Height (in)    1.524 m (5')  1.6 m (5' 3\")    Weight (lb) 153.77 152.78 154.1 152.7 152.12 152.12 152.34   BMI 27.94 kg/m2 27.76 kg/m2 28 kg/m2 29.82 kg/m2 29.71 kg/m2 26.95 kg/m2 26.99 kg/m2   BSA (m2) 1.75 m2 1.74 m2 1.75 m2 1.71 m2 1.71 m2 1.75 m2 1.75 m2   Visit Report Report Report Report    Report     Physical Exam:   Constitutional: alert, awake and oriented, not in acute distress   HEENT: moist mucous membranes, normal nose   Neck: supple, no lymphadenopathy   EYES: PERRL, EOM intact, conjunctiva normal  Skin: no jaundice, rash or erythema  Neurological: AAOx3, no gross focal deficit   Psychiatric: normal mood and behavior      Labs:  Labs:  Lab Results   Component Value Date    WBC 5.4 10/14/2024    NEUTROABS 3.79 10/14/2024    IGABSOL 0.00 10/14/2024    " LYMPHSABS 0.99 (L) 10/14/2024    MONOSABS 0.45 10/14/2024    EOSABS 0.13 10/14/2024    BASOSABS 0.03 10/14/2024    RBC 2.79 (L) 10/14/2024     10/14/2024    MCHC 32.6 10/14/2024    HGB 9.1 (L) 10/14/2024    HCT 27.9 (L) 10/14/2024     (L) 10/14/2024           Assessment:    58 y.o. female w/PMH s/f polycystic kidney disease referred for thrombocytopenia prior to being placed on kidney transplant list.     Discussed Bmbx c/w ITP and doesn't require treatment for now but if in future needs surgery with higher platelet count can offer TPO-RA (Avatrombopag).     Discussed starting patient on Promacta to get platelets to 100. Discussed potential side effects and cost.     Plan:    Reviewed and discussed lab, imaging, and pathology results with patient in detail as well as diagnosis, prognosis, and treatment options.    Platelet count at target with platelets at 125 discussed continue Promacta 12.5 mg every other day.    Will need closer monitoring of platelet count and Promacta.    F/U w/PCP and renal    RTC in 8 weeks     Patient verbalized understanding, and all her questions were answered to her satisfaction    30 min spent with patient greater than 50 % of which was spent in consultation and coordination of care.

## 2024-10-24 ENCOUNTER — HOSPITAL ENCOUNTER (OUTPATIENT)
Dept: RADIOLOGY | Facility: EXTERNAL LOCATION | Age: 59
Discharge: HOME | End: 2024-10-24

## 2024-10-24 DIAGNOSIS — Z01.818 PRE-TRANSPLANT EVALUATION FOR KIDNEY TRANSPLANT: ICD-10-CM

## 2024-10-25 ENCOUNTER — SPECIALTY PHARMACY (OUTPATIENT)
Dept: PHARMACY | Facility: CLINIC | Age: 59
End: 2024-10-25

## 2024-12-03 ENCOUNTER — SPECIALTY PHARMACY (OUTPATIENT)
Dept: PHARMACY | Facility: CLINIC | Age: 59
End: 2024-12-03

## 2024-12-11 ENCOUNTER — IMMUNIZATION (OUTPATIENT)
Dept: TRANSPLANT | Facility: HOSPITAL | Age: 59
End: 2024-12-11
Payer: MEDICAID

## 2024-12-11 NOTE — PROGRESS NOTES
Lab Results   Component Value Date    HEPBSAB <3.1 09/09/2024     LM for patient's son to see if his mom had the hepatitis B vaccination series.  If she has not had it, I encouraged him to have her get it.

## 2024-12-19 ENCOUNTER — IMMUNIZATION (OUTPATIENT)
Dept: TRANSPLANT | Facility: HOSPITAL | Age: 59
End: 2024-12-19
Payer: MEDICAID

## 2024-12-31 ENCOUNTER — OFFICE VISIT (OUTPATIENT)
Dept: HEMATOLOGY/ONCOLOGY | Facility: CLINIC | Age: 59
End: 2024-12-31
Payer: MEDICAID

## 2024-12-31 VITALS
WEIGHT: 150.57 LBS | BODY MASS INDEX: 26.67 KG/M2 | SYSTOLIC BLOOD PRESSURE: 180 MMHG | OXYGEN SATURATION: 99 % | HEART RATE: 79 BPM | RESPIRATION RATE: 16 BRPM | TEMPERATURE: 97.5 F | DIASTOLIC BLOOD PRESSURE: 74 MMHG

## 2024-12-31 DIAGNOSIS — D69.3 IDIOPATHIC THROMBOCYTOPENIC PURPURA (MULTI): ICD-10-CM

## 2024-12-31 DIAGNOSIS — D69.6 PLATELETS DECREASED (CMS-HCC): ICD-10-CM

## 2024-12-31 DIAGNOSIS — D64.9 ANEMIA: ICD-10-CM

## 2024-12-31 DIAGNOSIS — D69.6 THROMBOCYTOPENIA (CMS-HCC): ICD-10-CM

## 2024-12-31 LAB
ALBUMIN SERPL BCP-MCNC: 4 G/DL (ref 3.4–5)
ALP SERPL-CCNC: 65 U/L (ref 33–136)
ALT SERPL W P-5'-P-CCNC: 25 U/L (ref 7–45)
ANION GAP SERPL CALC-SCNC: 14 MMOL/L (ref 10–20)
AST SERPL W P-5'-P-CCNC: 25 U/L (ref 9–39)
BASOPHILS # BLD AUTO: 0.02 X10*3/UL (ref 0–0.1)
BASOPHILS NFR BLD AUTO: 0.3 %
BILIRUB SERPL-MCNC: 0.6 MG/DL (ref 0–1.2)
BUN SERPL-MCNC: 57 MG/DL (ref 6–23)
CALCIUM SERPL-MCNC: 9.4 MG/DL (ref 8.6–10.3)
CHLORIDE SERPL-SCNC: 109 MMOL/L (ref 98–107)
CO2 SERPL-SCNC: 20 MMOL/L (ref 21–32)
CREAT SERPL-MCNC: 7.23 MG/DL (ref 0.5–1.05)
EGFRCR SERPLBLD CKD-EPI 2021: 6 ML/MIN/1.73M*2
EOSINOPHIL # BLD AUTO: 0.18 X10*3/UL (ref 0–0.7)
EOSINOPHIL NFR BLD AUTO: 3 %
ERYTHROCYTE [DISTWIDTH] IN BLOOD BY AUTOMATED COUNT: 13 % (ref 11.5–14.5)
GLUCOSE SERPL-MCNC: 120 MG/DL (ref 74–99)
HCT VFR BLD AUTO: 27.9 % (ref 36–46)
HGB BLD-MCNC: 8.9 G/DL (ref 12–16)
IMM GRANULOCYTES # BLD AUTO: 0.01 X10*3/UL (ref 0–0.7)
IMM GRANULOCYTES NFR BLD AUTO: 0.2 % (ref 0–0.9)
LYMPHOCYTES # BLD AUTO: 0.86 X10*3/UL (ref 1.2–4.8)
LYMPHOCYTES NFR BLD AUTO: 14.3 %
MCH RBC QN AUTO: 32 PG (ref 26–34)
MCHC RBC AUTO-ENTMCNC: 31.9 G/DL (ref 32–36)
MCV RBC AUTO: 100 FL (ref 80–100)
MONOCYTES # BLD AUTO: 0.5 X10*3/UL (ref 0.1–1)
MONOCYTES NFR BLD AUTO: 8.3 %
NEUTROPHILS # BLD AUTO: 4.44 X10*3/UL (ref 1.2–7.7)
NEUTROPHILS NFR BLD AUTO: 73.9 %
PLATELET # BLD AUTO: 104 X10*3/UL (ref 150–450)
POTASSIUM SERPL-SCNC: 4.1 MMOL/L (ref 3.5–5.3)
PROT SERPL-MCNC: 7.5 G/DL (ref 6.4–8.2)
RBC # BLD AUTO: 2.78 X10*6/UL (ref 4–5.2)
SODIUM SERPL-SCNC: 139 MMOL/L (ref 136–145)
WBC # BLD AUTO: 6 X10*3/UL (ref 4.4–11.3)

## 2024-12-31 PROCEDURE — 85025 COMPLETE CBC W/AUTO DIFF WBC: CPT | Performed by: PHYSICIAN ASSISTANT

## 2024-12-31 PROCEDURE — 99213 OFFICE O/P EST LOW 20 MIN: CPT | Performed by: PHYSICIAN ASSISTANT

## 2024-12-31 PROCEDURE — 3078F DIAST BP <80 MM HG: CPT | Performed by: PHYSICIAN ASSISTANT

## 2024-12-31 PROCEDURE — 36415 COLL VENOUS BLD VENIPUNCTURE: CPT | Performed by: PHYSICIAN ASSISTANT

## 2024-12-31 PROCEDURE — 80053 COMPREHEN METABOLIC PANEL: CPT | Performed by: PHYSICIAN ASSISTANT

## 2024-12-31 PROCEDURE — 3077F SYST BP >= 140 MM HG: CPT | Performed by: PHYSICIAN ASSISTANT

## 2024-12-31 ASSESSMENT — PAIN SCALES - GENERAL: PAINLEVEL_OUTOF10: 0-NO PAIN

## 2024-12-31 NOTE — PROGRESS NOTES
Reason for Visit  Luis Panda is a 59 y.o. female w/PMH s/f polycystic kidney disease referred by Dr. Luciano for thrombocytopenia prior to being  placed on kidney transplant list.     Upon review of labs, noted to have thrombocytopenia since 2021 in the 's. Normocytic-macrocytic anemia. Normal WBC.    Patient presents with son and . Translation done by son. On assessment, reports long standing h/o constipation and chronic shoulder pain otherwise feeling well. Denies bleeding or bruising.     PMH/PSH: PKD, DMII, HTN, HL, fistula left arm.  FH: Denies FH of cancers, bleeding or clotting disorder.   Soc Hx: former smoker, denies ETOH, illicit drugs; , 4 children.    History of Present Illness:  Patient presents for follow up accompanied by grandson and . Patient on Promacta 12.5 mg every other day, tolerating well. But didn't take it last 5 days due to running out. On assessment, patient feeling well and denies any bleeding or excessive bruising.     Review of Systems: All of the systems have been reviewed and are negative for complaints except what is stated in the HPI and/or past medical history.    Allergies and Intolerances:  Allergies   Allergen Reactions    Amlodipine Unknown     LEG SWELLING    Oxycodone Unknown     Dizziness, weakness            Current Outpatient Medications   Medication Sig Dispense Refill    aspirin 81 mg EC tablet       calcitriol (Rocaltrol) 0.25 mcg capsule Take 1 capsule (0.25 mcg) by mouth once daily.      calcium carbonate-vitamin D3 (Oyster Shell) 250 mg-3.125 mcg (125 unit) tablet       carvedilol (Coreg) 25 mg tablet Take 1 tablet (25 mg) by mouth 2 times a day with meals.      cholecalciferol (Vitamin D-3) 50 MCG (2000 UT) tablet Take 1 tablet (50 mcg) by mouth once daily.      eltrombopag olamine (Promacta) 12.5 mg tablet Take 1 tablet (12.5 mg) by mouth once daily in the morning. Take before meals. Take on an empty stomach, 1 hour before or 2  "hours afer a meal. 30 tablet 3    ferrous sulfate, 325 mg ferrous sulfate, tablet Take 1 tablet by mouth 3 times a day.      glipiZIDE (Glucotrol) 5 mg tablet Take 1 tablet (5 mg) by mouth once daily.      hydrALAZINE (Apresoline) 25 mg tablet Take 1 tablet (25 mg) by mouth 3 times a day.      omeprazole (PriLOSEC) 40 mg DR capsule Take 1 capsule (40 mg) by mouth once daily.      ondansetron ODT (Zofran-ODT) 4 mg disintegrating tablet 1 tablet (4 mg) every 6 hours.      simvastatin (Zocor) 20 mg tablet Take 1 tablet (20 mg) by mouth once daily.      sodium bicarbonate 650 mg tablet Take 1 tablet (650 mg) by mouth.      traZODone (Desyrel) 50 mg tablet Take 1 tablet (50 mg) by mouth as needed at bedtime for sleep.      TRUEplus Lancets 33 gauge misc        No current facility-administered medications for this visit.        Vitals and Measurements:       9/9/2024    11:14 AM 9/13/2024     9:11 AM 10/11/2024    10:45 AM 10/11/2024    12:56 PM 10/11/2024     1:01 PM 10/14/2024    11:55 AM 12/31/2024    12:40 PM   Vitals   Systolic 184 193 168   153 180   Diastolic 69 80 79   82 74   BP Location Right arm  Right arm   Right arm Right arm   Heart Rate 79 73 72   79 79   Temp 36 °C (96.8 °F) 36.3 °C (97.3 °F)    36.2 °C (97.2 °F) 36.4 °C (97.5 °F)   Resp 16     17 16   Height   1.524 m (5')  1.6 m (5' 3\")     Weight (lb) 152.78 154.1 152.7 152.12 152.12 152.34 150.57   BMI 27.76 kg/m2 28 kg/m2 29.82 kg/m2 29.71 kg/m2 26.95 kg/m2 26.99 kg/m2 26.67 kg/m2   BSA (m2) 1.74 m2 1.75 m2 1.71 m2 1.71 m2 1.75 m2 1.75 m2 1.74 m2   Visit Report Report Report    Report Report     Physical Exam:   Constitutional: alert, awake and oriented, not in acute distress   HEENT: moist mucous membranes, normal nose   Neck: supple, no lymphadenopathy   EYES: PERRL, EOM intact, conjunctiva normal  Skin: no jaundice, rash or erythema  Neurological: AAOx3, no gross focal deficit   Psychiatric: normal mood and behavior      Labs:  Lab Results " "  Component Value Date    WBC 6.0 12/31/2024    NEUTROABS 4.44 12/31/2024    IGABSOL 0.01 12/31/2024    LYMPHSABS 0.86 (L) 12/31/2024    MONOSABS 0.50 12/31/2024    EOSABS 0.18 12/31/2024    BASOSABS 0.02 12/31/2024    RBC 2.78 (L) 12/31/2024     12/31/2024    MCHC 31.9 (L) 12/31/2024    HGB 8.9 (L) 12/31/2024    HCT 27.9 (L) 12/31/2024     (L) 12/31/2024     No results found for: \"RETICCTPCT\"   Lab Results   Component Value Date    CREATININE 7.23 (H) 12/31/2024    BUN 57 (H) 12/31/2024    EGFR 6 (L) 12/31/2024     12/31/2024    K 4.1 12/31/2024     (H) 12/31/2024    CO2 20 (L) 12/31/2024      Lab Results   Component Value Date    ALT 25 12/31/2024    AST 25 12/31/2024    ALKPHOS 65 12/31/2024    BILITOT 0.6 12/31/2024      No results found for: \"TSH\"  No results found for: \"TSH\", \"U8CKHVY\", \"Z5IAWCU\", \"THYROIDPAB\"  Lab Results   Component Value Date    IRON 81 01/10/2024    TIBC 261 01/10/2024    FERRITIN 557 (H) 01/10/2024      Lab Results   Component Value Date    [id number] 311 01/10/2024       Assessment:    58 y.o. female w/PMH s/f polycystic kidney disease referred for thrombocytopenia prior to being placed on kidney transplant list.     Discussed Bmbx c/w ITP and doesn't require treatment for now but if in future needs surgery with higher platelet count can offer TPO-RA (Avatrombopag).     Discussed starting patient on Promacta to get platelets to 100. Discussed potential side effects and cost.     Plan:    Reviewed and discussed lab, imaging, and pathology results with patient in detail as well as diagnosis, prognosis, and treatment options.    Platelet count at target with platelets at 104 discussed continue Promacta 12.5 mg every other day.    Will need closer monitoring of platelet count and Promacta.    F/U w/PCP and renal    RTC in 12 weeks     Patient verbalized understanding, and all her questions were answered to her satisfaction    30 min spent with patient greater than " 50 % of which was spent in consultation and coordination of care.   Yes

## 2025-01-28 ENCOUNTER — IMMUNIZATION (OUTPATIENT)
Dept: TRANSPLANT | Facility: HOSPITAL | Age: 60
End: 2025-01-28
Payer: MEDICAID

## 2025-01-28 NOTE — PROGRESS NOTES
Patient attests to being fully vaccinated against Hepatitis B: No   Reason(s) for deferral:     Patient objection    Lab Results   Component Value Date    HEPBSAB <3.1 09/09/2024       Immunization History   Administered Date(s) Administered Comments    None   Deferred Date(s) Deferred Comments    Hepatitis B vaccine, 18yrs and older(HEPLISAV) 12/19/2024 pt not vaccinated, encouraged to get hepatitis vaccination

## 2025-02-19 ENCOUNTER — DOCUMENTATION (OUTPATIENT)
Facility: HOSPITAL | Age: 60
End: 2025-02-19
Payer: MEDICAID

## 2025-02-19 NOTE — PROGRESS NOTES
Waitlist status:  Active    Next review:  Testing UTD    Next visit due:  With provider and full updates Oct 2025

## 2025-04-01 ENCOUNTER — TELEPHONE (OUTPATIENT)
Facility: HOSPITAL | Age: 60
End: 2025-04-01

## 2025-04-16 ENCOUNTER — TELEPHONE (OUTPATIENT)
Facility: HOSPITAL | Age: 60
End: 2025-04-16
Payer: MEDICAID

## 2025-04-30 NOTE — TELEPHONE ENCOUNTER
Attempted to reach patient about needing to update HLA sample.  Unable to leave a message on patient's voicemail.  Coordinator notified.

## 2025-05-12 ENCOUNTER — APPOINTMENT (OUTPATIENT)
Dept: HEMATOLOGY/ONCOLOGY | Facility: CLINIC | Age: 60
End: 2025-05-12
Payer: MEDICAID

## 2025-05-12 VITALS
SYSTOLIC BLOOD PRESSURE: 173 MMHG | HEART RATE: 87 BPM | TEMPERATURE: 97.7 F | DIASTOLIC BLOOD PRESSURE: 83 MMHG | OXYGEN SATURATION: 99 % | WEIGHT: 150.13 LBS | BODY MASS INDEX: 26.59 KG/M2 | RESPIRATION RATE: 17 BRPM

## 2025-05-12 DIAGNOSIS — D69.3 IDIOPATHIC THROMBOCYTOPENIC PURPURA (MULTI): ICD-10-CM

## 2025-05-12 DIAGNOSIS — D64.9 ANEMIA: ICD-10-CM

## 2025-05-12 DIAGNOSIS — D69.6 PLATELETS DECREASED: ICD-10-CM

## 2025-05-12 DIAGNOSIS — D69.6 THROMBOCYTOPENIA: ICD-10-CM

## 2025-05-12 LAB
ALBUMIN SERPL BCP-MCNC: 3.7 G/DL (ref 3.4–5)
ALP SERPL-CCNC: 66 U/L (ref 33–136)
ALT SERPL W P-5'-P-CCNC: 12 U/L (ref 7–45)
ANION GAP SERPL CALC-SCNC: 15 MMOL/L (ref 10–20)
AST SERPL W P-5'-P-CCNC: 20 U/L (ref 9–39)
BASOPHILS # BLD AUTO: 0.02 X10*3/UL (ref 0–0.1)
BASOPHILS NFR BLD AUTO: 0.3 %
BILIRUB SERPL-MCNC: 0.4 MG/DL (ref 0–1.2)
BUN SERPL-MCNC: 52 MG/DL (ref 6–23)
CALCIUM SERPL-MCNC: 8.8 MG/DL (ref 8.6–10.3)
CHLORIDE SERPL-SCNC: 105 MMOL/L (ref 98–107)
CO2 SERPL-SCNC: 20 MMOL/L (ref 21–32)
CREAT SERPL-MCNC: 7.77 MG/DL (ref 0.5–1.05)
EGFRCR SERPLBLD CKD-EPI 2021: 5 ML/MIN/1.73M*2
EOSINOPHIL # BLD AUTO: 0.13 X10*3/UL (ref 0–0.7)
EOSINOPHIL NFR BLD AUTO: 2 %
ERYTHROCYTE [DISTWIDTH] IN BLOOD BY AUTOMATED COUNT: 13 % (ref 11.5–14.5)
GLUCOSE SERPL-MCNC: 198 MG/DL (ref 74–99)
HCT VFR BLD AUTO: 25.7 % (ref 36–46)
HGB BLD-MCNC: 8.3 G/DL (ref 12–16)
IMM GRANULOCYTES # BLD AUTO: 0.01 X10*3/UL (ref 0–0.7)
IMM GRANULOCYTES NFR BLD AUTO: 0.2 % (ref 0–0.9)
LYMPHOCYTES # BLD AUTO: 0.78 X10*3/UL (ref 1.2–4.8)
LYMPHOCYTES NFR BLD AUTO: 12 %
MCH RBC QN AUTO: 32.5 PG (ref 26–34)
MCHC RBC AUTO-ENTMCNC: 32.3 G/DL (ref 32–36)
MCV RBC AUTO: 101 FL (ref 80–100)
MONOCYTES # BLD AUTO: 0.49 X10*3/UL (ref 0.1–1)
MONOCYTES NFR BLD AUTO: 7.5 %
NEUTROPHILS # BLD AUTO: 5.08 X10*3/UL (ref 1.2–7.7)
NEUTROPHILS NFR BLD AUTO: 78 %
NRBC BLD-RTO: ABNORMAL /100{WBCS}
PLATELET # BLD AUTO: 95 X10*3/UL (ref 150–450)
POTASSIUM SERPL-SCNC: 4.1 MMOL/L (ref 3.5–5.3)
PROT SERPL-MCNC: 7.2 G/DL (ref 6.4–8.2)
RBC # BLD AUTO: 2.55 X10*6/UL (ref 4–5.2)
RBC MORPH BLD: NORMAL
SODIUM SERPL-SCNC: 136 MMOL/L (ref 136–145)
STOMATOCYTES BLD QL SMEAR: NORMAL
WBC # BLD AUTO: 6.5 X10*3/UL (ref 4.4–11.3)

## 2025-05-12 PROCEDURE — 99214 OFFICE O/P EST MOD 30 MIN: CPT | Performed by: PHYSICIAN ASSISTANT

## 2025-05-12 PROCEDURE — 36415 COLL VENOUS BLD VENIPUNCTURE: CPT | Performed by: PHYSICIAN ASSISTANT

## 2025-05-12 PROCEDURE — 80053 COMPREHEN METABOLIC PANEL: CPT | Performed by: PHYSICIAN ASSISTANT

## 2025-05-12 PROCEDURE — 3077F SYST BP >= 140 MM HG: CPT | Performed by: PHYSICIAN ASSISTANT

## 2025-05-12 PROCEDURE — 3079F DIAST BP 80-89 MM HG: CPT | Performed by: PHYSICIAN ASSISTANT

## 2025-05-12 PROCEDURE — 85025 COMPLETE CBC W/AUTO DIFF WBC: CPT | Performed by: PHYSICIAN ASSISTANT

## 2025-05-12 ASSESSMENT — PAIN SCALES - GENERAL: PAINLEVEL_OUTOF10: 0-NO PAIN

## 2025-05-12 NOTE — PROGRESS NOTES
Reason for Visit  Luis Panda is a 60 y.o. female w/PMH s/f polycystic kidney disease referred by Dr. Luciano for thrombocytopenia prior to being  placed on kidney transplant list.     Upon review of labs, noted to have thrombocytopenia since 2021 in the 's. Normocytic-macrocytic anemia. Normal WBC.    Patient presents with son and . Translation done by son. On assessment, reports long standing h/o constipation and chronic shoulder pain otherwise feeling well. Denies bleeding or bruising.     PMH/PSH: PKD, DMII, HTN, HL, fistula left arm.  FH: Denies FH of cancers, bleeding or clotting disorder.   Soc Hx: former smoker, denies ETOH, illicit drugs; , 4 children.    History of Present Illness:  Patient presents for follow up accompanied by grandson and . Recently admitted for kidney stones s/p right ureteral stent in 4/4 and then removal 4/15/25. Patient on Promacta 12.5 mg every other day, tolerating well. On assessment, patient feeling well and denies any bleeding or excessive bruising.     Review of Systems: All of the systems have been reviewed and are negative for complaints except what is stated in the HPI and/or past medical history.    Allergies and Intolerances:  Allergies   Allergen Reactions    Amlodipine Unknown     LEG SWELLING    Oxycodone Unknown     Dizziness, weakness            Current Outpatient Medications   Medication Sig Dispense Refill    aspirin 81 mg EC tablet       calcitriol (Rocaltrol) 0.25 mcg capsule Take 1 capsule (0.25 mcg) by mouth once daily.      calcium carbonate-vitamin D3 (Oyster Shell) 250 mg-3.125 mcg (125 unit) tablet       carvedilol (Coreg) 25 mg tablet Take 1 tablet (25 mg) by mouth 2 times a day with meals.      cholecalciferol (Vitamin D-3) 50 MCG (2000 UT) tablet Take 1 tablet (50 mcg) by mouth once daily.      eltrombopag olamine (Promacta) 12.5 mg tablet Take 1 tablet (12.5 mg) by mouth once daily in the morning. Take before meals.  "Take on an empty stomach, 1 hour before or 2 hours afer a meal. 30 tablet 11    ferrous sulfate, 325 mg ferrous sulfate, tablet Take 1 tablet by mouth 3 times a day.      glipiZIDE (Glucotrol) 5 mg tablet Take 1 tablet (5 mg) by mouth once daily.      hydrALAZINE (Apresoline) 25 mg tablet Take 1 tablet (25 mg) by mouth 3 times a day.      omeprazole (PriLOSEC) 40 mg DR capsule Take 1 capsule (40 mg) by mouth once daily.      ondansetron ODT (Zofran-ODT) 4 mg disintegrating tablet 1 tablet (4 mg) every 6 hours.      simvastatin (Zocor) 20 mg tablet Take 1 tablet (20 mg) by mouth once daily.      sodium bicarbonate 650 mg tablet Take 1 tablet (650 mg) by mouth.      traZODone (Desyrel) 50 mg tablet Take 1 tablet (50 mg) by mouth as needed at bedtime for sleep.      TRUEplus Lancets 33 gauge misc        No current facility-administered medications for this visit.        Vitals and Measurements:       9/13/2024     9:11 AM 10/11/2024    10:45 AM 10/11/2024    12:56 PM 10/11/2024     1:01 PM 10/14/2024    11:55 AM 12/31/2024    12:40 PM 5/12/2025     1:17 PM   Vitals   Systolic 193 168   153 180 173   Diastolic 80 79   82 74 83   BP Location  Right arm   Right arm Right arm Right arm   Heart Rate 73 72   79 79 87   Temp 36.3 °C (97.3 °F)    36.2 °C (97.2 °F) 36.4 °C (97.5 °F) 36.5 °C (97.7 °F)   Resp     17 16 17   Height  1.524 m (5')  1.6 m (5' 3\")      Weight (lb) 154.1 152.7 152.12 152.12 152.34 150.57 150.13   BMI 28 kg/m2 29.82 kg/m2 29.71 kg/m2 26.95 kg/m2 26.99 kg/m2 26.67 kg/m2 26.59 kg/m2   BSA (m2) 1.75 m2 1.71 m2 1.71 m2 1.75 m2 1.75 m2 1.74 m2 1.74 m2   Visit Report Report    Report Report Report     Physical Exam:   Constitutional: alert, awake and oriented, not in acute distress   HEENT: moist mucous membranes, normal nose   Neck: supple, no lymphadenopathy   EYES: PERRL, EOM intact, conjunctiva normal  Skin: no jaundice, rash or erythema  Neurological: AAOx3, no gross focal deficit   Psychiatric: normal " "mood and behavior      Labs:  Lab Results   Component Value Date    WBC 6.5 05/12/2025    NEUTROABS 5.08 05/12/2025    IGABSOL 0.01 05/12/2025    LYMPHSABS 0.78 (L) 05/12/2025    MONOSABS 0.49 05/12/2025    EOSABS 0.13 05/12/2025    BASOSABS 0.02 05/12/2025    RBC 2.55 (L) 05/12/2025     (H) 05/12/2025    MCHC 32.3 05/12/2025    HGB 8.3 (L) 05/12/2025    HCT 25.7 (L) 05/12/2025    PLT 95 (L) 05/12/2025     No results found for: \"RETICCTPCT\"   Lab Results   Component Value Date    CREATININE 7.77 (H) 05/12/2025    BUN 52 (H) 05/12/2025    EGFR 5 (L) 05/12/2025     05/12/2025    K 4.1 05/12/2025     05/12/2025    CO2 20 (L) 05/12/2025      Lab Results   Component Value Date    ALT 25 12/31/2024    AST 25 12/31/2024    ALKPHOS 65 12/31/2024    BILITOT 0.6 12/31/2024      Lab Results   Component Value Date    IRON 81 01/10/2024    TIBC 261 01/10/2024    FERRITIN 557 (H) 01/10/2024      Lab Results   Component Value Date    [id number] 311 01/10/2024       Assessment:    58 y.o. female w/PMH s/f polycystic kidney disease referred for thrombocytopenia prior to being placed on kidney transplant list.     Discussed Bmbx c/w ITP and doesn't require treatment for now but if in future needs surgery with higher platelet count can offer TPO-RA (Avatrombopag).     Discussed starting patient on Promacta to get platelets to 100. Discussed potential side effects and cost.     Plan:    Reviewed and discussed lab, imaging, and pathology results with patient in detail as well as diagnosis, prognosis, and treatment options.    Platelet count at target with platelets discussed continue Promacta 12.5 mg every other day.    Will need close monitoring of platelet count and Promacta.    F/U w/PCP and renal    RTC in 6 months      Patient verbalized understanding, and all her questions were answered to her satisfaction    30 min spent with patient greater than 50 % of which was spent in consultation and coordination of " care.

## 2025-05-21 ENCOUNTER — TELEPHONE (OUTPATIENT)
Facility: HOSPITAL | Age: 60
End: 2025-05-21
Payer: MEDICAID

## 2025-05-21 NOTE — TELEPHONE ENCOUNTER
Attempted to reach patient about needing to update HLA sample.  Left VM requesting updated HLA sample.  Provided Pre Kidney office [phone] for questions.  Coordinator notified.

## 2025-05-29 ENCOUNTER — DOCUMENTATION (OUTPATIENT)
Facility: HOSPITAL | Age: 60
End: 2025-05-29
Payer: MEDICAID

## 2025-06-02 PROCEDURE — 86832 HLA CLASS I HIGH DEFIN QUAL: CPT | Mod: OUT | Performed by: SURGERY

## 2025-06-09 PROCEDURE — 81379 HLA I TYPING COMPLETE HR: CPT | Mod: OUT | Performed by: SURGERY

## 2025-06-10 ENCOUNTER — APPOINTMENT (OUTPATIENT)
Dept: RADIOLOGY | Facility: HOSPITAL | Age: 60
End: 2025-06-10
Payer: MEDICAID

## 2025-06-10 ENCOUNTER — HOSPITAL ENCOUNTER (INPATIENT)
Facility: HOSPITAL | Age: 60
End: 2025-06-10
Attending: SURGERY | Admitting: SURGERY
Payer: MEDICAID

## 2025-06-10 ENCOUNTER — DOCUMENTATION (OUTPATIENT)
Dept: TRANSPLANT | Facility: HOSPITAL | Age: 60
End: 2025-06-10
Payer: MEDICAID

## 2025-06-10 ENCOUNTER — TELEPHONE (OUTPATIENT)
Facility: HOSPITAL | Age: 60
End: 2025-06-10
Payer: MEDICAID

## 2025-06-10 ENCOUNTER — ANESTHESIA EVENT (OUTPATIENT)
Dept: OPERATING ROOM | Facility: HOSPITAL | Age: 60
End: 2025-06-10
Payer: MEDICAID

## 2025-06-10 ENCOUNTER — SURGERY (OUTPATIENT)
Age: 60
End: 2025-06-10
Payer: MEDICAID

## 2025-06-10 ENCOUNTER — TELEPHONE (OUTPATIENT)
Dept: TRANSPLANT | Facility: HOSPITAL | Age: 60
End: 2025-06-10
Payer: MEDICAID

## 2025-06-10 ENCOUNTER — ANESTHESIA (OUTPATIENT)
Dept: OPERATING ROOM | Facility: HOSPITAL | Age: 60
End: 2025-06-10
Payer: MEDICAID

## 2025-06-10 DIAGNOSIS — Z01.818 PRE-TRANSPLANT EVALUATION FOR KIDNEY TRANSPLANT: ICD-10-CM

## 2025-06-10 DIAGNOSIS — T38.0X5A STEROID-INDUCED HYPERGLYCEMIA: ICD-10-CM

## 2025-06-10 DIAGNOSIS — Z78.9 IMPAIRED MOBILITY AND ACTIVITIES OF DAILY LIVING: ICD-10-CM

## 2025-06-10 DIAGNOSIS — M79.89 SWELLING OF RIGHT UPPER EXTREMITY: ICD-10-CM

## 2025-06-10 DIAGNOSIS — I10 ESSENTIAL HYPERTENSION: ICD-10-CM

## 2025-06-10 DIAGNOSIS — G89.18 POST-OPERATIVE PAIN: ICD-10-CM

## 2025-06-10 DIAGNOSIS — K59.03 CONSTIPATION DUE TO OPIOID THERAPY: ICD-10-CM

## 2025-06-10 DIAGNOSIS — N18.6 ESRD (END STAGE RENAL DISEASE) (MULTI): ICD-10-CM

## 2025-06-10 DIAGNOSIS — Z94.0 KIDNEY REPLACED BY TRANSPLANT (HHS-HCC): ICD-10-CM

## 2025-06-10 DIAGNOSIS — Z74.09 IMPAIRED MOBILITY AND ACTIVITIES OF DAILY LIVING: ICD-10-CM

## 2025-06-10 DIAGNOSIS — R73.9 STEROID-INDUCED HYPERGLYCEMIA: ICD-10-CM

## 2025-06-10 DIAGNOSIS — Z76.82 KIDNEY TRANSPLANT CANDIDATE: Primary | ICD-10-CM

## 2025-06-10 DIAGNOSIS — T40.2X5A CONSTIPATION DUE TO OPIOID THERAPY: ICD-10-CM

## 2025-06-10 DIAGNOSIS — E11.9 TYPE 2 DIABETES MELLITUS WITHOUT COMPLICATION, WITHOUT LONG-TERM CURRENT USE OF INSULIN: Chronic | ICD-10-CM

## 2025-06-10 PROBLEM — Z98.890 PONV (POSTOPERATIVE NAUSEA AND VOMITING): Status: ACTIVE | Noted: 2025-06-10

## 2025-06-10 PROBLEM — R11.2 PONV (POSTOPERATIVE NAUSEA AND VOMITING): Status: ACTIVE | Noted: 2025-06-10

## 2025-06-10 LAB
ABO GROUP (TYPE) IN BLOOD: NORMAL
ALBUMIN SERPL BCP-MCNC: 3.5 G/DL (ref 3.4–5)
ALBUMIN SERPL BCP-MCNC: 4.1 G/DL (ref 3.4–5)
ALP SERPL-CCNC: 76 U/L (ref 33–136)
ALT SERPL W P-5'-P-CCNC: 13 U/L (ref 7–45)
ANION GAP BLDA CALCULATED.4IONS-SCNC: 11 MMO/L (ref 10–25)
ANION GAP BLDA CALCULATED.4IONS-SCNC: 11 MMO/L (ref 10–25)
ANION GAP BLDA CALCULATED.4IONS-SCNC: 14 MMO/L (ref 10–25)
ANION GAP BLDV CALCULATED.4IONS-SCNC: 14 MMOL/L (ref 10–25)
ANION GAP SERPL CALC-SCNC: 14 MMOL/L (ref 10–20)
ANION GAP SERPL CALC-SCNC: 19 MMOL/L (ref 10–20)
ANTIBODY SCREEN: NORMAL
APTT PPP: 39 SECONDS (ref 26–36)
AST SERPL W P-5'-P-CCNC: 22 U/L (ref 9–39)
B-HCG SERPL-ACNC: 11 MIU/ML
BASE EXCESS BLDA CALC-SCNC: -10.2 MMOL/L (ref -2–3)
BASE EXCESS BLDA CALC-SCNC: -10.4 MMOL/L (ref -2–3)
BASE EXCESS BLDA CALC-SCNC: -9.2 MMOL/L (ref -2–3)
BASE EXCESS BLDV CALC-SCNC: -8.2 MMOL/L (ref -2–3)
BASOPHILS # BLD AUTO: 0 X10*3/UL (ref 0–0.1)
BASOPHILS NFR BLD AUTO: 0 %
BILIRUB DIRECT SERPL-MCNC: 0.2 MG/DL (ref 0–0.3)
BILIRUB SERPL-MCNC: 0.6 MG/DL (ref 0–1.2)
BODY TEMPERATURE: 37 DEGREES CELSIUS
BUN SERPL-MCNC: 53 MG/DL (ref 6–23)
BUN SERPL-MCNC: 58 MG/DL (ref 6–23)
CA-I BLDA-SCNC: 1.15 MMOL/L (ref 1.1–1.33)
CA-I BLDA-SCNC: 1.21 MMOL/L (ref 1.1–1.33)
CA-I BLDA-SCNC: 1.26 MMOL/L (ref 1.1–1.33)
CA-I BLDV-SCNC: 1.3 MMOL/L (ref 1.1–1.33)
CALCIUM SERPL-MCNC: 8.7 MG/DL (ref 8.6–10.6)
CALCIUM SERPL-MCNC: 9.4 MG/DL (ref 8.6–10.6)
CHLORIDE BLDA-SCNC: 112 MMOL/L (ref 98–107)
CHLORIDE BLDA-SCNC: 115 MMOL/L (ref 98–107)
CHLORIDE BLDA-SCNC: 116 MMOL/L (ref 98–107)
CHLORIDE BLDV-SCNC: 111 MMOL/L (ref 98–107)
CHLORIDE SERPL-SCNC: 109 MMOL/L (ref 98–107)
CHLORIDE SERPL-SCNC: 110 MMOL/L (ref 98–107)
CMV IGG AVIDITY SERPL IA-RTO: REACTIVE %
CO2 SERPL-SCNC: 16 MMOL/L (ref 21–32)
CO2 SERPL-SCNC: 20 MMOL/L (ref 21–32)
CREAT SERPL-MCNC: 7.6 MG/DL (ref 0.5–1.05)
CREAT SERPL-MCNC: 8.4 MG/DL (ref 0.5–1.05)
EBV NA AB SER QL: POSITIVE
EGFRCR SERPLBLD CKD-EPI 2021: 5 ML/MIN/1.73M*2
EGFRCR SERPLBLD CKD-EPI 2021: 6 ML/MIN/1.73M*2
EOSINOPHIL # BLD AUTO: 0 X10*3/UL (ref 0–0.7)
EOSINOPHIL NFR BLD AUTO: 0 %
ERYTHROCYTE [DISTWIDTH] IN BLOOD BY AUTOMATED COUNT: 12.9 % (ref 11.5–14.5)
ERYTHROCYTE [DISTWIDTH] IN BLOOD BY AUTOMATED COUNT: 14.7 % (ref 11.5–14.5)
GLUCOSE BLD MANUAL STRIP-MCNC: 156 MG/DL (ref 74–99)
GLUCOSE BLDA-MCNC: 116 MG/DL (ref 74–99)
GLUCOSE BLDA-MCNC: 171 MG/DL (ref 74–99)
GLUCOSE BLDA-MCNC: 90 MG/DL (ref 74–99)
GLUCOSE BLDV-MCNC: 119 MG/DL (ref 74–99)
GLUCOSE SERPL-MCNC: 117 MG/DL (ref 74–99)
GLUCOSE SERPL-MCNC: 170 MG/DL (ref 74–99)
HBV CORE AB SER QL: NONREACTIVE
HBV SURFACE AB SER-ACNC: 17.5 MIU/ML
HBV SURFACE AG SERPL QL IA: NONREACTIVE
HCO3 BLDA-SCNC: 16.3 MMOL/L (ref 22–26)
HCO3 BLDA-SCNC: 16.8 MMOL/L (ref 22–26)
HCO3 BLDA-SCNC: 17.1 MMOL/L (ref 22–26)
HCO3 BLDV-SCNC: 18.4 MMOL/L (ref 22–26)
HCT VFR BLD AUTO: 24.5 % (ref 36–46)
HCT VFR BLD AUTO: 27.8 % (ref 36–46)
HCT VFR BLD EST: 18 % (ref 36–46)
HCT VFR BLD EST: 25 % (ref 36–46)
HCT VFR BLD EST: 26 % (ref 36–46)
HCT VFR BLD EST: 32 % (ref 36–46)
HCV AB SER QL: NONREACTIVE
HGB BLD-MCNC: 7.8 G/DL (ref 12–16)
HGB BLD-MCNC: 8.7 G/DL (ref 12–16)
HGB BLDA-MCNC: 6 G/DL (ref 12–16)
HGB BLDA-MCNC: 8.4 G/DL (ref 12–16)
HGB BLDA-MCNC: 8.7 G/DL (ref 12–16)
HGB BLDV-MCNC: 10.5 G/DL (ref 12–16)
HIV 1+2 AB+HIV1 P24 AG SERPL QL IA: NONREACTIVE
IMM GRANULOCYTES # BLD AUTO: 0.02 X10*3/UL (ref 0–0.7)
IMM GRANULOCYTES NFR BLD AUTO: 0.4 % (ref 0–0.9)
INHALED O2 CONCENTRATION: 21 %
INHALED O2 CONCENTRATION: 22 %
INHALED O2 CONCENTRATION: 50 %
INHALED O2 CONCENTRATION: 50 %
INR PPP: 1 (ref 0.9–1.1)
LACTATE BLDA-SCNC: 1.2 MMOL/L (ref 0.4–2)
LACTATE BLDA-SCNC: 1.6 MMOL/L (ref 0.4–2)
LACTATE BLDA-SCNC: 1.6 MMOL/L (ref 0.4–2)
LACTATE BLDV-SCNC: 0.9 MMOL/L (ref 0.4–2)
LYMPHOCYTES # BLD AUTO: 0.03 X10*3/UL (ref 1.2–4.8)
LYMPHOCYTES NFR BLD AUTO: 0.6 %
MCH RBC QN AUTO: 31.2 PG (ref 26–34)
MCH RBC QN AUTO: 31.8 PG (ref 26–34)
MCHC RBC AUTO-ENTMCNC: 31.3 G/DL (ref 32–36)
MCHC RBC AUTO-ENTMCNC: 31.8 G/DL (ref 32–36)
MCV RBC AUTO: 102 FL (ref 80–100)
MCV RBC AUTO: 98 FL (ref 80–100)
MONOCYTES # BLD AUTO: 0.08 X10*3/UL (ref 0.1–1)
MONOCYTES NFR BLD AUTO: 1.5 %
NEUTROPHILS # BLD AUTO: 5.24 X10*3/UL (ref 1.2–7.7)
NEUTROPHILS NFR BLD AUTO: 97.5 %
NRBC BLD-RTO: 0 /100 WBCS (ref 0–0)
NRBC BLD-RTO: 0 /100 WBCS (ref 0–0)
OXYHGB MFR BLDA: 96.1 % (ref 94–98)
OXYHGB MFR BLDA: 97 % (ref 94–98)
OXYHGB MFR BLDA: 97.4 % (ref 94–98)
OXYHGB MFR BLDV: 33.9 % (ref 45–75)
PCO2 BLDA: 38 MM HG (ref 38–42)
PCO2 BLDA: 38 MM HG (ref 38–42)
PCO2 BLDA: 42 MM HG (ref 38–42)
PCO2 BLDV: 41 MM HG (ref 41–51)
PH BLDA: 7.21 PH (ref 7.38–7.42)
PH BLDA: 7.24 PH (ref 7.38–7.42)
PH BLDA: 7.26 PH (ref 7.38–7.42)
PH BLDV: 7.26 PH (ref 7.33–7.43)
PHOSPHATE SERPL-MCNC: 4.5 MG/DL (ref 2.5–4.9)
PHOSPHATE SERPL-MCNC: 4.5 MG/DL (ref 2.5–4.9)
PLATELET # BLD AUTO: 109 X10*3/UL (ref 150–450)
PLATELET # BLD AUTO: 67 X10*3/UL (ref 150–450)
PO2 BLDA: 125 MM HG (ref 85–95)
PO2 BLDA: 158 MM HG (ref 85–95)
PO2 BLDA: 187 MM HG (ref 85–95)
PO2 BLDV: 25 MM HG (ref 35–45)
POTASSIUM BLDA-SCNC: 4 MMOL/L (ref 3.5–5.3)
POTASSIUM BLDA-SCNC: 4.3 MMOL/L (ref 3.5–5.3)
POTASSIUM BLDA-SCNC: 4.8 MMOL/L (ref 3.5–5.3)
POTASSIUM BLDV-SCNC: 4.2 MMOL/L (ref 3.5–5.3)
POTASSIUM SERPL-SCNC: 4 MMOL/L (ref 3.5–5.3)
POTASSIUM SERPL-SCNC: 4.3 MMOL/L (ref 3.5–5.3)
PROT SERPL-MCNC: 7.7 G/DL (ref 6.4–8.2)
PROTHROMBIN TIME: 11.4 SECONDS (ref 9.8–12.4)
RBC # BLD AUTO: 2.5 X10*6/UL (ref 4–5.2)
RBC # BLD AUTO: 2.74 X10*6/UL (ref 4–5.2)
RH FACTOR (ANTIGEN D): NORMAL
SAO2 % BLDA: 97 % (ref 94–100)
SAO2 % BLDA: 98 % (ref 94–100)
SAO2 % BLDA: 98 % (ref 94–100)
SAO2 % BLDV: 34 % (ref 45–75)
SODIUM BLDA-SCNC: 138 MMOL/L (ref 136–145)
SODIUM BLDA-SCNC: 139 MMOL/L (ref 136–145)
SODIUM BLDA-SCNC: 139 MMOL/L (ref 136–145)
SODIUM BLDV-SCNC: 139 MMOL/L (ref 136–145)
SODIUM SERPL-SCNC: 140 MMOL/L (ref 136–145)
SODIUM SERPL-SCNC: 140 MMOL/L (ref 136–145)
WBC # BLD AUTO: 5.4 X10*3/UL (ref 4.4–11.3)
WBC # BLD AUTO: 7.3 X10*3/UL (ref 4.4–11.3)

## 2025-06-10 PROCEDURE — 30233N1 TRANSFUSION OF NONAUTOLOGOUS RED BLOOD CELLS INTO PERIPHERAL VEIN, PERCUTANEOUS APPROACH: ICD-10-PCS | Performed by: STUDENT IN AN ORGANIZED HEALTH CARE EDUCATION/TRAINING PROGRAM

## 2025-06-10 PROCEDURE — 84132 ASSAY OF SERUM POTASSIUM: CPT

## 2025-06-10 PROCEDURE — 84702 CHORIONIC GONADOTROPIN TEST: CPT

## 2025-06-10 PROCEDURE — 86923 COMPATIBILITY TEST ELECTRIC: CPT

## 2025-06-10 PROCEDURE — 85025 COMPLETE CBC W/AUTO DIFF WBC: CPT

## 2025-06-10 PROCEDURE — 86803 HEPATITIS C AB TEST: CPT

## 2025-06-10 PROCEDURE — 2500000004 HC RX 250 GENERAL PHARMACY W/ HCPCS (ALT 636 FOR OP/ED): Mod: SE

## 2025-06-10 PROCEDURE — 3600000009 HC OR TIME - EACH INCREMENTAL 1 MINUTE - PROCEDURE LEVEL FOUR: Performed by: SURGERY

## 2025-06-10 PROCEDURE — 85027 COMPLETE CBC AUTOMATED: CPT

## 2025-06-10 PROCEDURE — 2720000007 HC OR 272 NO HCPCS: Performed by: SURGERY

## 2025-06-10 PROCEDURE — 87340 HEPATITIS B SURFACE AG IA: CPT

## 2025-06-10 PROCEDURE — 2500000004 HC RX 250 GENERAL PHARMACY W/ HCPCS (ALT 636 FOR OP/ED): Mod: JZ,SE

## 2025-06-10 PROCEDURE — 7100000001 HC RECOVERY ROOM TIME - INITIAL BASE CHARGE: Performed by: SURGERY

## 2025-06-10 PROCEDURE — 87389 HIV-1 AG W/HIV-1&-2 AB AG IA: CPT

## 2025-06-10 PROCEDURE — 76776 US EXAM K TRANSPL W/DOPPLER: CPT | Performed by: STUDENT IN AN ORGANIZED HEALTH CARE EDUCATION/TRAINING PROGRAM

## 2025-06-10 PROCEDURE — 86826 HLA X-MATCH NONCYTOTOXC ADDL: CPT | Mod: OUT | Performed by: SURGERY

## 2025-06-10 PROCEDURE — 87522 HEPATITIS C REVRS TRNSCRPJ: CPT

## 2025-06-10 PROCEDURE — C2617 STENT, NON-COR, TEM W/O DEL: HCPCS | Performed by: SURGERY

## 2025-06-10 PROCEDURE — A50365: Performed by: STUDENT IN AN ORGANIZED HEALTH CARE EDUCATION/TRAINING PROGRAM

## 2025-06-10 PROCEDURE — 2500000005 HC RX 250 GENERAL PHARMACY W/O HCPCS: Mod: SE | Performed by: STUDENT IN AN ORGANIZED HEALTH CARE EDUCATION/TRAINING PROGRAM

## 2025-06-10 PROCEDURE — 36620 INSERTION CATHETER ARTERY: CPT

## 2025-06-10 PROCEDURE — 71045 X-RAY EXAM CHEST 1 VIEW: CPT | Performed by: RADIOLOGY

## 2025-06-10 PROCEDURE — 2500000005 HC RX 250 GENERAL PHARMACY W/O HCPCS: Mod: SE

## 2025-06-10 PROCEDURE — 82306 VITAMIN D 25 HYDROXY: CPT

## 2025-06-10 PROCEDURE — 86664 EPSTEIN-BARR NUCLEAR ANTIGEN: CPT

## 2025-06-10 PROCEDURE — 50360 RNL ALTRNSPLJ W/O RCP NFRCT: CPT | Performed by: SURGERY

## 2025-06-10 PROCEDURE — 99223 1ST HOSP IP/OBS HIGH 75: CPT | Performed by: SURGERY

## 2025-06-10 PROCEDURE — 37799 UNLISTED PX VASCULAR SURGERY: CPT

## 2025-06-10 PROCEDURE — 2500000004 HC RX 250 GENERAL PHARMACY W/ HCPCS (ALT 636 FOR OP/ED): Mod: JZ,SE | Performed by: STUDENT IN AN ORGANIZED HEALTH CARE EDUCATION/TRAINING PROGRAM

## 2025-06-10 PROCEDURE — 86706 HEP B SURFACE ANTIBODY: CPT

## 2025-06-10 PROCEDURE — 36415 COLL VENOUS BLD VENIPUNCTURE: CPT

## 2025-06-10 PROCEDURE — 82947 ASSAY GLUCOSE BLOOD QUANT: CPT

## 2025-06-10 PROCEDURE — 8120000002 HC CADAVER DONOR - KIDNEY: Performed by: SURGERY

## 2025-06-10 PROCEDURE — P9016 RBC LEUKOCYTES REDUCED: HCPCS

## 2025-06-10 PROCEDURE — 3700000001 HC GENERAL ANESTHESIA TIME - INITIAL BASE CHARGE: Performed by: SURGERY

## 2025-06-10 PROCEDURE — 86704 HEP B CORE ANTIBODY TOTAL: CPT

## 2025-06-10 PROCEDURE — 86900 BLOOD TYPING SEROLOGIC ABO: CPT

## 2025-06-10 PROCEDURE — 30233R1 TRANSFUSION OF NONAUTOLOGOUS PLATELETS INTO PERIPHERAL VEIN, PERCUTANEOUS APPROACH: ICD-10-PCS | Performed by: STUDENT IN AN ORGANIZED HEALTH CARE EDUCATION/TRAINING PROGRAM

## 2025-06-10 PROCEDURE — 86825 HLA X-MATH NON-CYTOTOXIC: CPT | Mod: OUT | Performed by: SURGERY

## 2025-06-10 PROCEDURE — 2500000001 HC RX 250 WO HCPCS SELF ADMINISTERED DRUGS (ALT 637 FOR MEDICARE OP): Mod: SE

## 2025-06-10 PROCEDURE — 86644 CMV ANTIBODY: CPT

## 2025-06-10 PROCEDURE — 71045 X-RAY EXAM CHEST 1 VIEW: CPT

## 2025-06-10 PROCEDURE — 0TY00Z0 TRANSPLANTATION OF RIGHT KIDNEY, ALLOGENEIC, OPEN APPROACH: ICD-10-PCS | Performed by: SURGERY

## 2025-06-10 PROCEDURE — 82248 BILIRUBIN DIRECT: CPT

## 2025-06-10 PROCEDURE — 3700000002 HC GENERAL ANESTHESIA TIME - EACH INCREMENTAL 1 MINUTE: Performed by: SURGERY

## 2025-06-10 PROCEDURE — 84100 ASSAY OF PHOSPHORUS: CPT

## 2025-06-10 PROCEDURE — 1200000002 HC GENERAL ROOM WITH TELEMETRY DAILY

## 2025-06-10 PROCEDURE — 80069 RENAL FUNCTION PANEL: CPT | Mod: CCI

## 2025-06-10 PROCEDURE — 85730 THROMBOPLASTIN TIME PARTIAL: CPT

## 2025-06-10 PROCEDURE — 36430 TRANSFUSION BLD/BLD COMPNT: CPT

## 2025-06-10 PROCEDURE — 82330 ASSAY OF CALCIUM: CPT

## 2025-06-10 PROCEDURE — 80076 HEPATIC FUNCTION PANEL: CPT

## 2025-06-10 PROCEDURE — 86901 BLOOD TYPING SEROLOGIC RH(D): CPT

## 2025-06-10 PROCEDURE — 2500000002 HC RX 250 W HCPCS SELF ADMINISTERED DRUGS (ALT 637 FOR MEDICARE OP, ALT 636 FOR OP/ED): Mod: SE

## 2025-06-10 PROCEDURE — 2780000003 HC OR 278 NO HCPCS: Performed by: SURGERY

## 2025-06-10 PROCEDURE — 80505 PATH CLIN CONSLTJ HIGH 41-60: CPT | Performed by: SURGERY

## 2025-06-10 PROCEDURE — P9045 ALBUMIN (HUMAN), 5%, 250 ML: HCPCS | Mod: JZ,SE

## 2025-06-10 PROCEDURE — 7100000002 HC RECOVERY ROOM TIME - EACH INCREMENTAL 1 MINUTE: Performed by: SURGERY

## 2025-06-10 PROCEDURE — 85610 PROTHROMBIN TIME: CPT

## 2025-06-10 PROCEDURE — 76776 US EXAM K TRANSPL W/DOPPLER: CPT

## 2025-06-10 PROCEDURE — 3600000004 HC OR TIME - INITIAL BASE CHARGE - PROCEDURE LEVEL FOUR: Performed by: SURGERY

## 2025-06-10 DEVICE — 6.0FR(2MM)X 12 CM SURGITEK DOUBLE-J CLOSED TIP URETERAL STENT
Type: IMPLANTABLE DEVICE | Site: URETER | Status: FUNCTIONAL
Brand: DOUBLE-J

## 2025-06-10 RX ORDER — SODIUM BICARBONATE 1 MEQ/ML
50 SYRINGE (ML) INTRAVENOUS ONCE
Status: COMPLETED | OUTPATIENT
Start: 2025-06-10 | End: 2025-06-10

## 2025-06-10 RX ORDER — HYDROMORPHONE HYDROCHLORIDE 0.2 MG/ML
0.1 INJECTION INTRAMUSCULAR; INTRAVENOUS; SUBCUTANEOUS EVERY 5 MIN PRN
Status: DISCONTINUED | OUTPATIENT
Start: 2025-06-10 | End: 2025-06-10 | Stop reason: HOSPADM

## 2025-06-10 RX ORDER — FUROSEMIDE 10 MG/ML
INJECTION INTRAMUSCULAR; INTRAVENOUS AS NEEDED
Status: DISCONTINUED | OUTPATIENT
Start: 2025-06-10 | End: 2025-06-10

## 2025-06-10 RX ORDER — HYDROMORPHONE HYDROCHLORIDE 0.2 MG/ML
0.2 INJECTION INTRAMUSCULAR; INTRAVENOUS; SUBCUTANEOUS
Status: DISCONTINUED | OUTPATIENT
Start: 2025-06-10 | End: 2025-06-12

## 2025-06-10 RX ORDER — LIDOCAINE HYDROCHLORIDE 20 MG/ML
INJECTION, SOLUTION INFILTRATION; PERINEURAL AS NEEDED
Status: DISCONTINUED | OUTPATIENT
Start: 2025-06-10 | End: 2025-06-10

## 2025-06-10 RX ORDER — ACETAMINOPHEN 325 MG/1
650 TABLET ORAL EVERY 6 HOURS PRN
Status: DISCONTINUED | OUTPATIENT
Start: 2025-06-12 | End: 2025-06-13

## 2025-06-10 RX ORDER — OXYCODONE HYDROCHLORIDE 5 MG/1
5 TABLET ORAL EVERY 4 HOURS PRN
Status: DISCONTINUED | OUTPATIENT
Start: 2025-06-10 | End: 2025-06-13

## 2025-06-10 RX ORDER — MYCOPHENOLATE MOFETIL 250 MG/1
1000 CAPSULE ORAL EVERY 12 HOURS
Status: DISCONTINUED | OUTPATIENT
Start: 2025-06-10 | End: 2025-06-11

## 2025-06-10 RX ORDER — DIPHENHYDRAMINE HYDROCHLORIDE 50 MG/ML
INJECTION, SOLUTION INTRAMUSCULAR; INTRAVENOUS AS NEEDED
Status: DISCONTINUED | OUTPATIENT
Start: 2025-06-10 | End: 2025-06-10

## 2025-06-10 RX ORDER — NALOXONE HYDROCHLORIDE 0.4 MG/ML
0.2 INJECTION, SOLUTION INTRAMUSCULAR; INTRAVENOUS; SUBCUTANEOUS EVERY 5 MIN PRN
Status: DISCONTINUED | OUTPATIENT
Start: 2025-06-10 | End: 2025-06-16 | Stop reason: HOSPADM

## 2025-06-10 RX ORDER — CARVEDILOL 25 MG/1
25 TABLET ORAL EVERY 12 HOURS
Status: DISCONTINUED | OUTPATIENT
Start: 2025-06-10 | End: 2025-06-16 | Stop reason: HOSPADM

## 2025-06-10 RX ORDER — OXYCODONE HYDROCHLORIDE 5 MG/1
10 TABLET ORAL EVERY 4 HOURS PRN
Status: DISCONTINUED | OUTPATIENT
Start: 2025-06-10 | End: 2025-06-13

## 2025-06-10 RX ORDER — HYDROMORPHONE HYDROCHLORIDE 0.2 MG/ML
0.2 INJECTION INTRAMUSCULAR; INTRAVENOUS; SUBCUTANEOUS EVERY 5 MIN PRN
Status: DISCONTINUED | OUTPATIENT
Start: 2025-06-10 | End: 2025-06-10 | Stop reason: HOSPADM

## 2025-06-10 RX ORDER — LIDOCAINE HYDROCHLORIDE 10 MG/ML
0.1 INJECTION, SOLUTION INFILTRATION; PERINEURAL ONCE
Status: DISCONTINUED | OUTPATIENT
Start: 2025-06-10 | End: 2025-06-10 | Stop reason: HOSPADM

## 2025-06-10 RX ORDER — INSULIN LISPRO 100 [IU]/ML
0-5 INJECTION, SOLUTION INTRAVENOUS; SUBCUTANEOUS
Status: DISCONTINUED | OUTPATIENT
Start: 2025-06-11 | End: 2025-06-11

## 2025-06-10 RX ORDER — DEXTROSE 50 % IN WATER (D50W) INTRAVENOUS SYRINGE
25
Status: DISCONTINUED | OUTPATIENT
Start: 2025-06-10 | End: 2025-06-16 | Stop reason: HOSPADM

## 2025-06-10 RX ORDER — METHYLPREDNISOLONE SODIUM SUCCINATE 500 MG/8ML
INJECTION INTRAMUSCULAR; INTRAVENOUS AS NEEDED
Status: DISCONTINUED | OUTPATIENT
Start: 2025-06-10 | End: 2025-06-10

## 2025-06-10 RX ORDER — CEFAZOLIN SODIUM 2 G/100ML
2 INJECTION, SOLUTION INTRAVENOUS EVERY 8 HOURS
Status: COMPLETED | OUTPATIENT
Start: 2025-06-10 | End: 2025-06-11

## 2025-06-10 RX ORDER — HYDRALAZINE HYDROCHLORIDE 25 MG/1
25 TABLET, FILM COATED ORAL EVERY 8 HOURS PRN
Status: DISCONTINUED | OUTPATIENT
Start: 2025-06-10 | End: 2025-06-14

## 2025-06-10 RX ORDER — GABAPENTIN 300 MG/1
300 CAPSULE ORAL ONCE
Status: COMPLETED | OUTPATIENT
Start: 2025-06-10 | End: 2025-06-10

## 2025-06-10 RX ORDER — DIPHENHYDRAMINE HCL 25 MG
25 CAPSULE ORAL ONCE
Status: DISCONTINUED | OUTPATIENT
Start: 2025-06-10 | End: 2025-06-10

## 2025-06-10 RX ORDER — MIDAZOLAM HYDROCHLORIDE 1 MG/ML
INJECTION INTRAMUSCULAR; INTRAVENOUS AS NEEDED
Status: DISCONTINUED | OUTPATIENT
Start: 2025-06-10 | End: 2025-06-10

## 2025-06-10 RX ORDER — ACETAMINOPHEN 325 MG/1
650 TABLET ORAL EVERY 6 HOURS SCHEDULED
Status: DISPENSED | OUTPATIENT
Start: 2025-06-10 | End: 2025-06-12

## 2025-06-10 RX ORDER — GLYCOPYRROLATE 0.2 MG/ML
INJECTION INTRAMUSCULAR; INTRAVENOUS AS NEEDED
Status: DISCONTINUED | OUTPATIENT
Start: 2025-06-10 | End: 2025-06-10

## 2025-06-10 RX ORDER — ACETAMINOPHEN 325 MG/1
650 TABLET ORAL ONCE
Status: DISCONTINUED | OUTPATIENT
Start: 2025-06-10 | End: 2025-06-10

## 2025-06-10 RX ORDER — PHENYLEPHRINE HCL IN 0.9% NACL 0.4MG/10ML
SYRINGE (ML) INTRAVENOUS AS NEEDED
Status: DISCONTINUED | OUTPATIENT
Start: 2025-06-10 | End: 2025-06-10

## 2025-06-10 RX ORDER — ACETAMINOPHEN 10 MG/ML
1000 INJECTION, SOLUTION INTRAVENOUS ONCE
Status: COMPLETED | OUTPATIENT
Start: 2025-06-10 | End: 2025-06-10

## 2025-06-10 RX ORDER — ONDANSETRON HYDROCHLORIDE 2 MG/ML
INJECTION, SOLUTION INTRAVENOUS AS NEEDED
Status: DISCONTINUED | OUTPATIENT
Start: 2025-06-10 | End: 2025-06-10

## 2025-06-10 RX ORDER — CALCIUM CHLORIDE INJECTION 100 MG/ML
INJECTION, SOLUTION INTRAVENOUS AS NEEDED
Status: DISCONTINUED | OUTPATIENT
Start: 2025-06-10 | End: 2025-06-10

## 2025-06-10 RX ORDER — ACETAMINOPHEN 325 MG/1
975 TABLET ORAL ONCE
Status: COMPLETED | OUTPATIENT
Start: 2025-06-10 | End: 2025-06-10

## 2025-06-10 RX ORDER — HEPARIN SODIUM 1000 [USP'U]/ML
INJECTION, SOLUTION INTRAVENOUS; SUBCUTANEOUS AS NEEDED
Status: DISCONTINUED | OUTPATIENT
Start: 2025-06-10 | End: 2025-06-10

## 2025-06-10 RX ORDER — ONDANSETRON HYDROCHLORIDE 2 MG/ML
4 INJECTION, SOLUTION INTRAVENOUS ONCE AS NEEDED
Status: DISCONTINUED | OUTPATIENT
Start: 2025-06-10 | End: 2025-06-10 | Stop reason: HOSPADM

## 2025-06-10 RX ORDER — ALBUMIN HUMAN 50 G/1000ML
SOLUTION INTRAVENOUS AS NEEDED
Status: DISCONTINUED | OUTPATIENT
Start: 2025-06-10 | End: 2025-06-10

## 2025-06-10 RX ORDER — PANTOPRAZOLE SODIUM 40 MG/1
40 TABLET, DELAYED RELEASE ORAL
Status: DISCONTINUED | OUTPATIENT
Start: 2025-06-11 | End: 2025-06-16 | Stop reason: HOSPADM

## 2025-06-10 RX ORDER — DEXTROSE 50 % IN WATER (D50W) INTRAVENOUS SYRINGE
12.5
Status: DISCONTINUED | OUTPATIENT
Start: 2025-06-10 | End: 2025-06-16 | Stop reason: HOSPADM

## 2025-06-10 RX ORDER — LABETALOL HYDROCHLORIDE 5 MG/ML
10 INJECTION, SOLUTION INTRAVENOUS EVERY 10 MIN PRN
Status: DISCONTINUED | OUTPATIENT
Start: 2025-06-10 | End: 2025-06-10 | Stop reason: HOSPADM

## 2025-06-10 RX ORDER — SODIUM CHLORIDE 450 MG/100ML
60 INJECTION, SOLUTION INTRAVENOUS CONTINUOUS
Status: DISCONTINUED | OUTPATIENT
Start: 2025-06-10 | End: 2025-06-11

## 2025-06-10 RX ORDER — SODIUM BICARBONATE 1 MEQ/ML
SYRINGE (ML) INTRAVENOUS AS NEEDED
Status: DISCONTINUED | OUTPATIENT
Start: 2025-06-10 | End: 2025-06-10

## 2025-06-10 RX ORDER — CEFAZOLIN SODIUM 2 G/100ML
2 INJECTION, SOLUTION INTRAVENOUS ONCE
Status: COMPLETED | OUTPATIENT
Start: 2025-06-10 | End: 2025-06-10

## 2025-06-10 RX ORDER — SODIUM CHLORIDE, SODIUM LACTATE, POTASSIUM CHLORIDE, CALCIUM CHLORIDE 600; 310; 30; 20 MG/100ML; MG/100ML; MG/100ML; MG/100ML
100 INJECTION, SOLUTION INTRAVENOUS CONTINUOUS
Status: DISCONTINUED | OUTPATIENT
Start: 2025-06-10 | End: 2025-06-10 | Stop reason: HOSPADM

## 2025-06-10 RX ORDER — FENTANYL CITRATE 50 UG/ML
INJECTION, SOLUTION INTRAMUSCULAR; INTRAVENOUS AS NEEDED
Status: DISCONTINUED | OUTPATIENT
Start: 2025-06-10 | End: 2025-06-10

## 2025-06-10 RX ORDER — SODIUM CHLORIDE 9 MG/ML
999 INJECTION, SOLUTION INTRAVENOUS CONTINUOUS
Status: DISCONTINUED | OUTPATIENT
Start: 2025-06-10 | End: 2025-06-11

## 2025-06-10 RX ORDER — PREDNISONE 20 MG/1
20 TABLET ORAL DAILY
Status: DISCONTINUED | OUTPATIENT
Start: 2025-06-14 | End: 2025-06-11

## 2025-06-10 RX ORDER — APREPITANT 40 MG/1
CAPSULE ORAL AS NEEDED
Status: DISCONTINUED | OUTPATIENT
Start: 2025-06-10 | End: 2025-06-10

## 2025-06-10 RX ORDER — NORETHINDRONE AND ETHINYL ESTRADIOL 0.5-0.035
KIT ORAL AS NEEDED
Status: DISCONTINUED | OUTPATIENT
Start: 2025-06-10 | End: 2025-06-10

## 2025-06-10 RX ORDER — ROCURONIUM BROMIDE 10 MG/ML
INJECTION, SOLUTION INTRAVENOUS AS NEEDED
Status: DISCONTINUED | OUTPATIENT
Start: 2025-06-10 | End: 2025-06-10

## 2025-06-10 RX ORDER — PROPOFOL 10 MG/ML
INJECTION, EMULSION INTRAVENOUS AS NEEDED
Status: DISCONTINUED | OUTPATIENT
Start: 2025-06-10 | End: 2025-06-10

## 2025-06-10 RX ORDER — POLYETHYLENE GLYCOL 3350 17 G/17G
17 POWDER, FOR SOLUTION ORAL DAILY PRN
Status: DISCONTINUED | OUTPATIENT
Start: 2025-06-10 | End: 2025-06-12

## 2025-06-10 RX ADMIN — ACETAMINOPHEN 975 MG: 325 TABLET ORAL at 11:49

## 2025-06-10 RX ADMIN — Medication 80 MCG: at 13:44

## 2025-06-10 RX ADMIN — MIDAZOLAM HYDROCHLORIDE 2 MG: 2 INJECTION, SOLUTION INTRAMUSCULAR; INTRAVENOUS at 12:32

## 2025-06-10 RX ADMIN — PROPOFOL 50 MG: 10 INJECTION, EMULSION INTRAVENOUS at 12:45

## 2025-06-10 RX ADMIN — ACETAMINOPHEN 1000 MG: 10 INJECTION, SOLUTION INTRAVENOUS at 16:02

## 2025-06-10 RX ADMIN — PROPOFOL 150 MG: 10 INJECTION, EMULSION INTRAVENOUS at 12:32

## 2025-06-10 RX ADMIN — SODIUM BICARBONATE 50 MEQ: 84 INJECTION INTRAVENOUS at 15:56

## 2025-06-10 RX ADMIN — ROCURONIUM BROMIDE 20 MG: 10 INJECTION, SOLUTION INTRAVENOUS at 13:34

## 2025-06-10 RX ADMIN — HEPARIN SODIUM 2000 UNITS: 1000 INJECTION INTRAVENOUS; SUBCUTANEOUS at 14:11

## 2025-06-10 RX ADMIN — Medication 80 MCG: at 13:59

## 2025-06-10 RX ADMIN — FENTANYL CITRATE 50 MCG: 50 INJECTION, SOLUTION INTRAMUSCULAR; INTRAVENOUS at 13:03

## 2025-06-10 RX ADMIN — SODIUM CHLORIDE 60 ML/HR: 4.5 INJECTION, SOLUTION INTRAVENOUS at 17:00

## 2025-06-10 RX ADMIN — APREPITANT 40 MG: 40 CAPSULE ORAL at 11:06

## 2025-06-10 RX ADMIN — EPHEDRINE SULFATE 5 MG: 50 INJECTION INTRAVENOUS at 14:21

## 2025-06-10 RX ADMIN — HYDROMORPHONE HYDROCHLORIDE 0.2 MG: 0.2 INJECTION, SOLUTION INTRAMUSCULAR; INTRAVENOUS; SUBCUTANEOUS at 18:43

## 2025-06-10 RX ADMIN — ROCURONIUM BROMIDE 60 MG: 10 INJECTION, SOLUTION INTRAVENOUS at 12:33

## 2025-06-10 RX ADMIN — FENTANYL CITRATE 25 MCG: 50 INJECTION, SOLUTION INTRAMUSCULAR; INTRAVENOUS at 13:18

## 2025-06-10 RX ADMIN — LIDOCAINE HYDROCHLORIDE 70 MG: 20 INJECTION, SOLUTION INFILTRATION; PERINEURAL at 12:32

## 2025-06-10 RX ADMIN — FENTANYL CITRATE 50 MCG: 50 INJECTION, SOLUTION INTRAMUSCULAR; INTRAVENOUS at 12:32

## 2025-06-10 RX ADMIN — Medication 40 MCG: at 13:54

## 2025-06-10 RX ADMIN — GABAPENTIN 300 MG: 300 CAPSULE ORAL at 11:48

## 2025-06-10 RX ADMIN — SODIUM CHLORIDE, SODIUM LACTATE, POTASSIUM CHLORIDE, AND CALCIUM CHLORIDE: 600; 310; 30; 20 INJECTION, SOLUTION INTRAVENOUS at 12:22

## 2025-06-10 RX ADMIN — ONDANSETRON 4 MG: 2 INJECTION, SOLUTION INTRAMUSCULAR; INTRAVENOUS at 16:18

## 2025-06-10 RX ADMIN — ALBUMIN HUMAN 250 ML: 0.05 INJECTION, SOLUTION INTRAVENOUS at 14:02

## 2025-06-10 RX ADMIN — MYCOPHENOLATE MOFETIL 1000 MG: 250 CAPSULE ORAL at 19:42

## 2025-06-10 RX ADMIN — CEFAZOLIN SODIUM 2 G: 2 INJECTION, SOLUTION INTRAVENOUS at 12:45

## 2025-06-10 RX ADMIN — SUGAMMADEX 200 MG: 100 INJECTION, SOLUTION INTRAVENOUS at 16:20

## 2025-06-10 RX ADMIN — FUROSEMIDE 80 MG: 10 INJECTION INTRAMUSCULAR; INTRAVENOUS at 14:50

## 2025-06-10 RX ADMIN — Medication 6 L/MIN: at 16:35

## 2025-06-10 RX ADMIN — METHYLPREDNISOLONE SODIUM SUCCINATE 500 MG: 500 INJECTION, POWDER, FOR SOLUTION INTRAMUSCULAR; INTRAVENOUS at 12:59

## 2025-06-10 RX ADMIN — Medication 40 MCG: at 13:24

## 2025-06-10 RX ADMIN — CEFAZOLIN SODIUM 2 G: 2 INJECTION, SOLUTION INTRAVENOUS at 20:26

## 2025-06-10 RX ADMIN — GLYCOPYRROLATE 0.2 MG: 0.2 SOLUTION INTRAMUSCULAR; INTRAVENOUS at 14:14

## 2025-06-10 RX ADMIN — FENTANYL CITRATE 25 MCG: 50 INJECTION, SOLUTION INTRAMUSCULAR; INTRAVENOUS at 13:13

## 2025-06-10 RX ADMIN — CALCIUM CHLORIDE 0.2 G: 100 INJECTION, SOLUTION INTRAVENOUS at 15:17

## 2025-06-10 RX ADMIN — HYDROMORPHONE HYDROCHLORIDE 0.2 MG: 0.2 INJECTION, SOLUTION INTRAMUSCULAR; INTRAVENOUS; SUBCUTANEOUS at 19:47

## 2025-06-10 RX ADMIN — DIPHENHYDRAMINE HYDROCHLORIDE 50 MG: 50 INJECTION INTRAMUSCULAR; INTRAVENOUS at 12:48

## 2025-06-10 RX ADMIN — Medication 80 MCG: at 14:34

## 2025-06-10 RX ADMIN — SODIUM BICARBONATE 50 MEQ: 84 INJECTION INTRAVENOUS at 20:26

## 2025-06-10 RX ADMIN — SODIUM CHLORIDE 225 ML/HR: 0.9 INJECTION, SOLUTION INTRAVENOUS at 17:00

## 2025-06-10 RX ADMIN — ANTI-THYMOCYTE GLOBULIN (RABBIT) 100 MG: 5 INJECTION, POWDER, LYOPHILIZED, FOR SOLUTION INTRAVENOUS at 13:37

## 2025-06-10 SDOH — ECONOMIC STABILITY: FOOD INSECURITY: WITHIN THE PAST 12 MONTHS, THE FOOD YOU BOUGHT JUST DIDN'T LAST AND YOU DIDN'T HAVE MONEY TO GET MORE.: NEVER TRUE

## 2025-06-10 SDOH — ECONOMIC STABILITY: INCOME INSECURITY: IN THE PAST 12 MONTHS HAS THE ELECTRIC, GAS, OIL, OR WATER COMPANY THREATENED TO SHUT OFF SERVICES IN YOUR HOME?: NO

## 2025-06-10 SDOH — SOCIAL STABILITY: SOCIAL INSECURITY: WITHIN THE LAST YEAR, HAVE YOU BEEN AFRAID OF YOUR PARTNER OR EX-PARTNER?: NO

## 2025-06-10 SDOH — ECONOMIC STABILITY: FOOD INSECURITY: WITHIN THE PAST 12 MONTHS, YOU WORRIED THAT YOUR FOOD WOULD RUN OUT BEFORE YOU GOT THE MONEY TO BUY MORE.: NEVER TRUE

## 2025-06-10 SDOH — SOCIAL STABILITY: SOCIAL INSECURITY: ABUSE: ADULT

## 2025-06-10 SDOH — SOCIAL STABILITY: SOCIAL INSECURITY
WITHIN THE LAST YEAR, HAVE YOU BEEN KICKED, HIT, SLAPPED, OR OTHERWISE PHYSICALLY HURT BY YOUR PARTNER OR EX-PARTNER?: NO

## 2025-06-10 SDOH — SOCIAL STABILITY: SOCIAL INSECURITY: WITHIN THE LAST YEAR, HAVE YOU BEEN HUMILIATED OR EMOTIONALLY ABUSED IN OTHER WAYS BY YOUR PARTNER OR EX-PARTNER?: NO

## 2025-06-10 SDOH — SOCIAL STABILITY: SOCIAL INSECURITY: HAVE YOU HAD THOUGHTS OF HARMING ANYONE ELSE?: NO

## 2025-06-10 SDOH — SOCIAL STABILITY: SOCIAL INSECURITY: DO YOU FEEL ANYONE HAS EXPLOITED OR TAKEN ADVANTAGE OF YOU FINANCIALLY OR OF YOUR PERSONAL PROPERTY?: NO

## 2025-06-10 SDOH — HEALTH STABILITY: MENTAL HEALTH: CURRENT SMOKER: 0

## 2025-06-10 SDOH — SOCIAL STABILITY: SOCIAL INSECURITY
WITHIN THE LAST YEAR, HAVE YOU BEEN RAPED OR FORCED TO HAVE ANY KIND OF SEXUAL ACTIVITY BY YOUR PARTNER OR EX-PARTNER?: NO

## 2025-06-10 SDOH — SOCIAL STABILITY: SOCIAL INSECURITY: DOES ANYONE TRY TO KEEP YOU FROM HAVING/CONTACTING OTHER FRIENDS OR DOING THINGS OUTSIDE YOUR HOME?: NO

## 2025-06-10 SDOH — SOCIAL STABILITY: SOCIAL INSECURITY: ARE YOU OR HAVE YOU BEEN THREATENED OR ABUSED PHYSICALLY, EMOTIONALLY, OR SEXUALLY BY ANYONE?: NO

## 2025-06-10 SDOH — SOCIAL STABILITY: SOCIAL INSECURITY: DO YOU FEEL UNSAFE GOING BACK TO THE PLACE WHERE YOU ARE LIVING?: NO

## 2025-06-10 SDOH — SOCIAL STABILITY: SOCIAL INSECURITY: ARE THERE ANY APPARENT SIGNS OF INJURIES/BEHAVIORS THAT COULD BE RELATED TO ABUSE/NEGLECT?: NO

## 2025-06-10 SDOH — SOCIAL STABILITY: SOCIAL INSECURITY: HAS ANYONE EVER THREATENED TO HURT YOUR FAMILY OR YOUR PETS?: NO

## 2025-06-10 SDOH — SOCIAL STABILITY: SOCIAL INSECURITY: HAVE YOU HAD ANY THOUGHTS OF HARMING ANYONE ELSE?: NO

## 2025-06-10 SDOH — SOCIAL STABILITY: SOCIAL INSECURITY: WERE YOU ABLE TO COMPLETE ALL THE BEHAVIORAL HEALTH SCREENINGS?: YES

## 2025-06-10 ASSESSMENT — COLUMBIA-SUICIDE SEVERITY RATING SCALE - C-SSRS
2. HAVE YOU ACTUALLY HAD ANY THOUGHTS OF KILLING YOURSELF?: NO
6. HAVE YOU EVER DONE ANYTHING, STARTED TO DO ANYTHING, OR PREPARED TO DO ANYTHING TO END YOUR LIFE?: NO
1. IN THE PAST MONTH, HAVE YOU WISHED YOU WERE DEAD OR WISHED YOU COULD GO TO SLEEP AND NOT WAKE UP?: NO
2. HAVE YOU ACTUALLY HAD ANY THOUGHTS OF KILLING YOURSELF?: NO
6. HAVE YOU EVER DONE ANYTHING, STARTED TO DO ANYTHING, OR PREPARED TO DO ANYTHING TO END YOUR LIFE?: NO
1. IN THE PAST MONTH, HAVE YOU WISHED YOU WERE DEAD OR WISHED YOU COULD GO TO SLEEP AND NOT WAKE UP?: NO

## 2025-06-10 ASSESSMENT — PAIN DESCRIPTION - ORIENTATION: ORIENTATION: RIGHT

## 2025-06-10 ASSESSMENT — COGNITIVE AND FUNCTIONAL STATUS - GENERAL
MOVING FROM LYING ON BACK TO SITTING ON SIDE OF FLAT BED WITH BEDRAILS: A LITTLE
MOVING TO AND FROM BED TO CHAIR: A LITTLE
HELP NEEDED FOR BATHING: A LITTLE
TOILETING: A LITTLE
MOBILITY SCORE: 24
TURNING FROM BACK TO SIDE WHILE IN FLAT BAD: A LITTLE
STANDING UP FROM CHAIR USING ARMS: A LITTLE
DRESSING REGULAR LOWER BODY CLOTHING: A LITTLE
PATIENT BASELINE BEDBOUND: NO
DAILY ACTIVITIY SCORE: 24
PERSONAL GROOMING: A LITTLE
MOBILITY SCORE: 16
CLIMB 3 TO 5 STEPS WITH RAILING: A LOT
WALKING IN HOSPITAL ROOM: A LOT
DAILY ACTIVITIY SCORE: 18
DRESSING REGULAR UPPER BODY CLOTHING: A LITTLE
EATING MEALS: A LITTLE

## 2025-06-10 ASSESSMENT — ACTIVITIES OF DAILY LIVING (ADL)
PATIENT'S MEMORY ADEQUATE TO SAFELY COMPLETE DAILY ACTIVITIES?: YES
HEARING - RIGHT EAR: FUNCTIONAL
TOILETING: INDEPENDENT
ADEQUATE_TO_COMPLETE_ADL: YES
DRESSING YOURSELF: INDEPENDENT
WALKS IN HOME: INDEPENDENT
HEARING - LEFT EAR: FUNCTIONAL
FEEDING YOURSELF: INDEPENDENT
GROOMING: INDEPENDENT
JUDGMENT_ADEQUATE_SAFELY_COMPLETE_DAILY_ACTIVITIES: YES
LACK_OF_TRANSPORTATION: NO
BATHING: INDEPENDENT

## 2025-06-10 ASSESSMENT — LIFESTYLE VARIABLES
HOW OFTEN DO YOU HAVE 6 OR MORE DRINKS ON ONE OCCASION: NEVER
SKIP TO QUESTIONS 9-10: 1
AUDIT-C TOTAL SCORE: 0
AUDIT-C TOTAL SCORE: 0
HOW MANY STANDARD DRINKS CONTAINING ALCOHOL DO YOU HAVE ON A TYPICAL DAY: PATIENT DOES NOT DRINK
HOW OFTEN DO YOU HAVE A DRINK CONTAINING ALCOHOL: NEVER

## 2025-06-10 ASSESSMENT — PAIN DESCRIPTION - LOCATION
LOCATION: ABDOMEN
LOCATION: ABDOMEN

## 2025-06-10 ASSESSMENT — PATIENT HEALTH QUESTIONNAIRE - PHQ9
2. FEELING DOWN, DEPRESSED OR HOPELESS: NOT AT ALL
1. LITTLE INTEREST OR PLEASURE IN DOING THINGS: NOT AT ALL
SUM OF ALL RESPONSES TO PHQ9 QUESTIONS 1 & 2: 0

## 2025-06-10 ASSESSMENT — PAIN - FUNCTIONAL ASSESSMENT
PAIN_FUNCTIONAL_ASSESSMENT: UNABLE TO SELF-REPORT
PAIN_FUNCTIONAL_ASSESSMENT: 0-10
PAIN_FUNCTIONAL_ASSESSMENT: UNABLE TO SELF-REPORT
PAIN_FUNCTIONAL_ASSESSMENT: 0-10
PAIN_FUNCTIONAL_ASSESSMENT: 0-10
PAIN_FUNCTIONAL_ASSESSMENT: UNABLE TO SELF-REPORT

## 2025-06-10 ASSESSMENT — PAIN SCALES - GENERAL
PAINLEVEL_OUTOF10: 5 - MODERATE PAIN
PAINLEVEL_OUTOF10: 6
PAINLEVEL_OUTOF10: 6
PAINLEVEL_OUTOF10: 0 - NO PAIN

## 2025-06-10 NOTE — ANESTHESIA PREPROCEDURE EVALUATION
Patient: Luis Panda    Procedure Information       Date/Time: 06/10/25 1100    Procedure: TRANSPLANT, KIDNEY (Abdomen)    Location: [facility] OR 20 / [facility] OR    Surgeons: Debbie Gallo MD            Relevant Problems   Anesthesia   (+) PONV (postoperative nausea and vomiting)      Cardiac  -ve stress test    (+) Essential hypertension   (+) Mixed hyperlipidemia      Neuro   (+) Cerebral aneurysm, nonruptured (HHS-HCC)      GI   (+) Gastroesophageal reflux disease without esophagitis      /Renal  No dialysis , AVF on the left arm   Still makes 1-2 L urine / day   (+) ESRD (end stage renal disease) (Multi)      Liver (within normal limits)      Endocrine   (+) Type 2 diabetes mellitus without complication, without long-term current use of insulin      Hematology  1 U PRBC given 1 month ago per the son    (+) Anemia      Musculoskeletal (within normal limits)      HEENT   (+) Vision impairment       Clinical information reviewed:   Tobacco  Allergies    Med Hx  Surg Hx   Fam Hx  Soc Hx        NPO Detail:  NPO/Void Status  Carbohydrate Drink Given Prior to Surgery? : N  Date of Last Liquid: 06/09/25  Time of Last Liquid: 2300  Date of Last Solid: 06/09/25  Time of Last Solid: 2300  Last Intake Type: Clear fluids; Light meal  Time of Last Void: 0700         Physical Exam    Airway  Mallampati: III  Neck ROM: full  Mouth opening: 3 or more finger widths     Cardiovascular   Rhythm: regular     Dental        Pulmonary    Abdominal          Anesthesia Plan    History of general anesthesia?: yes  History of complications of general anesthesia?: yes    ASA 3     general     The patient is not a current smoker.    intravenous induction   Postoperative pain plan includes opioids.  Trial extubation is planned.  Anesthetic plan and risks discussed with patient.  Use of blood products discussed with patient who consented to blood products.    Plan discussed with attending.

## 2025-06-10 NOTE — ANESTHESIA POSTPROCEDURE EVALUATION
Patient: Luis Panda    Procedure Summary       Date: 06/10/25 Room / Location: [facility] OR 20 / [facility]    Anesthesia Start: 1223 Anesthesia Stop: 1641    Procedure: TRANSPLANT, KIDNEY (Abdomen) Diagnosis:       ESRD (end stage renal disease) (Multi)      (ESRD (end stage renal disease) (Multi) [N18.6])    Surgeons: Debbie Gallo MD Responsible Provider: Keith Shaffer MD    Anesthesia Type: general ASA Status: 3            Anesthesia Type: general    Vitals Value Taken Time   /87 06/10/25 16:47   Temp 36.5 06/10/25 16:47   Pulse 98 06/10/25 16:42   Resp 19 06/10/25 16:42   SpO2 100 % 06/10/25 16:42   Vitals shown include unfiled device data.    Anesthesia Post Evaluation    Patient location during evaluation: PACU  Patient participation: complete - patient cannot participate  Level of consciousness: sleepy but conscious  Pain management: satisfactory to patient  Airway patency: patent  Cardiovascular status: acceptable and hypertensive  Respiratory status: acceptable and face mask  Hydration status: acceptable  Postoperative Nausea and Vomiting: none        There were no known notable events for this encounter.

## 2025-06-10 NOTE — ANESTHESIA PROCEDURE NOTES
Peripheral IV  Date/Time: 6/10/2025 12:36 PM      Placement  Needle size: 16 G  Laterality: right  Location: hand  Attempts: 1

## 2025-06-10 NOTE — TELEPHONE ENCOUNTER
UNOS ID:  MATCH ID:  ORGAN:  KDPI (if applicable):  KNOWN RISK STATUS:   LOCAL VS IMPORT:   HCV (if applicable):  HepBcAb (if applicable):    Confirm the following:  Any COVID symptoms (nausea, vomiting, diarrhea, fever, chills, cough, sore throat, headache): YES-DESCRIBE/NO: No  Any contact with anyone positive for or suspected to have COVID: YES-DESCRIBE/NO: No  Any recent hospitalizations: YES-DESCRIBE/NO: No  Any recent illnesses: YES-DESCRIBE/NO: No  Any recent injuries (cracked teeth, broken bones, wounds that won’t heal): YES-DESCRIBE/NO: No  Any antibiotics: YES-DESCRIBE/NO: No  Any blood thinners: YES-DESCRIBE/NO: No  Any blood transfusions: YES-DESCRIBE/NO: Yes, describe: hemoglobin low, transfusion was a month and a half ago  When was last dialysis/type: YES/NA/NO: No    The last time they ate or drank anything: last night 9pm  Ask the surgeon whether or not the patient should be made NPO at this time. YES  It is not necessary for the patient to bring anything with them other than a current medication list and insurance information. Yes  Determine ETA to hospital: 45 min-1 hour  Patient aware of the possibility of a dry-run.  Yes

## 2025-06-10 NOTE — ANESTHESIA PROCEDURE NOTES
Peripheral IV  Date/Time: 6/10/2025 12:35 PM      Placement  Needle size: 16 G  Laterality: right  Location: hand  Attempts: 1

## 2025-06-10 NOTE — ANESTHESIA PROCEDURE NOTES
Airway  Date/Time: 6/10/2025 12:35 PM  Reason: elective    Airway not difficult    Staffing  Performed: resident   Authorized by: Keith Shaffer MD    Performed by: Kala Camarillo MD  Patient location during procedure: OR    Patient Condition  Indications for airway management: anesthesia  Patient position: sniffing  MILS maintained throughout  Planned trial extubation  Sedation level: deep     Final Airway Details   Preoxygenated: yes  Final airway type: endotracheal airway  Successful airway: ETT  Cuffed: yes   Successful intubation technique: video laryngoscopy  Adjuncts used in placement: intubating stylet  Endotracheal tube insertion site: oral  Blade: Jeremy  Blade size: #3  ETT size (mm): 7.0  Cormack-Lehane Classification: grade I - full view of glottis  Placement verified by: chest auscultation   Inital cuff pressure (cm H2O): 8  Measured from: lips  ETT to lips (cm): 20  Number of attempts at approach: 1  Number of other approaches attempted: 0    Additional Comments  Teeth intact

## 2025-06-10 NOTE — OP NOTE
Transplant Operative Note     Date:  6/10/2025                  OR Location: [facility] OR     Name: Luis Panda   : 1965    Age:  60 y.o.    MRN: [id number]     Diagnosis  Pre-op Diagnosis     * ESRD Post-op Diagnosis     * S/p kidney transplant      Procedures  Backtable preparation  donor renal allograft   donor kidney transplant  Placement of ureteral stent     Surgeon: Debbie Gallo MD, PHD, MPH     Assistant: Rahat Haynes MD    Specimen: none collected    Indications: Luis Panda  is a 60 y.o. female presents with ESRD for kidney transplant.    Procedure Details:  The patient was brought into the operating room and placed in a supine position on the OR table. Prior to proceeding, a complete team huddle was taken and recorded in the EMR to include identification of the patient, identification of the procedure, identification of the operative site, presence of all required radiographs and/or equipment, and the timely administration of appropriate antibiotics. In addition, I personally verified that the B blood type of the recipient was compatible with the B blood type of the donor and that the UNOS ID number [id number] on the packaging matched that of the paperwork for the kidney. This was separately recorded in the EMR with two signatures prior to anesthesia induction.     After the induction of GET anesthesia, lines were placed by the anesthesiologist. The urinary bladder was catheterized and irrigated with saline. There was no tension on the axillae and all pressure points were padded. Sequential compression boots were used as were Isabel Huggers. The abdomen was prepped and draped in the usual sterile fashion.     Prior to starting the operation, the left kidney was prepared on the back table. The preservation method used was cold static storage. The preservation solution used was UW. The kidney was cleared of the perinephric fat and the vessels were dissected to the  appropriate length. The renal vein was retroaortic and thinner than standard.     Skin was incised over the right lower quadrant with a knife and deepened with electrocautery. Attention was turned to mobilization of the external iliac vessels. Overlying lymphatics were ligated or cauterized and the vessels were dissected free for a length compatible with anastomosis. Venous control was obtained with a vascular clamp. A venotomy was made, the vein irrigated, and a renal vein to external iliac vein anastomosis was created with 6-0 Prolene. Arterial control was obtained with a vascular clamp. Arteriotomy was made, the artery irrigated, and the donor renal artery was anastomosed to external iliac artery with 6-0 Prolene. Anastomosis time was 37 minutes and cold ischemia time was 21 hours and 54 minutes. Kidney reperfused nicely with good turgor; made a small amount of urine intra-op. After hemostasis was obtained, a Lich uretero-neocystostomy was created. The bladder was filled and identified, opened, and the anastomosis created; a ureteral stent was used. Normal bladder; 3-day Quiroz.     The kidney was well perfused and sitting appropriately without tension on the anastomoses. A 19 [place] Adrián drain was placed in the perinephric space. The fascia was closed with running #1 Prolene. The subcutaneous tissues were irrigated, and Jaswant's was closed with running 3-0 Vicryl. The skin was closed with staples, and a dry, sterile dressing was placed. At the end of the case the sponge and instrument counts were all correct. The patient was extubated and brought to the PACU in stable condition.    Complications: None; patient tolerated the procedure well     Attending Attestation: I was present and scrubbed for the entire case.      Debbie Gallo MD, PhD, MPH, FACS  Chief, Transplant and Hepatobiliary Surgery

## 2025-06-10 NOTE — ANESTHESIA PROCEDURE NOTES
Arterial Line:    Date/Time: 6/10/2025 12:35 PM    Staffing  Performed: resident   Authorized by: Keith Shaffer MD    Performed by: Kala Camarillo MD    An arterial line was placed. Procedure performed using surface landmarks.in the OR for the following indication(s): continuous blood pressure monitoring and blood sampling needed.    A 20 gauge (size), 1 and 3/8 inch (length), Angiocath (type) catheter was placed into the Right radial artery, secured by Tegaderm,   Seldinger technique not used.

## 2025-06-10 NOTE — H&P
Transplant Surgery History and Physical    Subjective   Chief Complaint/Reason for Admission: Potential kidney transplant    HPI:  Luis Panda is a 60 y.o. year old female with a PMHx significant for ESRD 2/2 PCKD who presents for possible  donor kidney transplant. Patient has a LUE fistula but has never required dialysis. Patient makes an estimated 1-2 L of urine each day. Dry weight is 66.10 kg. Past surgical history includes AVF creation. Past medical history incudes ITP, PCKD, HTN, and cervical radiculopathy.  Denies any recent sick contacts, as well as fevers/chills, headache, dizziness, chest pain, cough, shortness of breath, nausea/vomiting, constipation/diarrhea, abdominal pain, weakness.    Kidney Info:  Etiology: DCD  No increased risk  ABO: B  PRA: 19 %  CMV: Positive/Pending  EBV: Positive IgG, Negative IgM/Pending  Toxo: Negative/Pending  HCV Ab/REHAN: Negative/Pending  HBcAb: Negative/Pending  ABsAg/HBV REHAN: Negative/Pending    Recent imaging includes:  - ECHO on 2023, which revealed an EF of 70-75%  - Stress Test on 10/04/2024, which revealed no evidence of inducible or prior ischemia  - CT Abd/Pelvis on 2025, which revealed mild aortic atherosclerosis without notable iliac disease    A 12-point ROS was performed and was unremarkable except as above.    PMH:  Medical History[1]  PSH:  Surgical History[2]  Soc Hx:  Social History     Socioeconomic History    Marital status:      Spouse name: Not on file    Number of children: Not on file    Years of education: Not on file    Highest education level: Not on file   Occupational History    Not on file   Tobacco Use    Smoking status: Former     Current packs/day: 0.00     Types: Cigarettes     Quit date:      Years since quittin.4     Passive exposure: Never    Smokeless tobacco: Never   Substance and Sexual Activity    Alcohol use: Not Currently    Drug use: Never    Sexual activity: Not on file   Other Topics  Concern    Not on file   Social History Narrative    Not on file     Social Drivers of Health     Financial Resource Strain: Not on file   Food Insecurity: No Food Insecurity (4/2/2025)    Received from [facility]    Hunger Vital Sign     Worried About Running Out of Food in the Last Year: Never true     Ran Out of Food in the Last Year: Never true   Transportation Needs: No Transportation Needs (4/2/2025)    Received from [facility]    PRAPARE - Transportation     Lack of Transportation (Medical): No     Lack of Transportation (Non-Medical): No   Physical Activity: Not on file   Stress: Not on file   Social Connections: Not on file   Intimate Partner Violence: Not on file   Housing Stability: Low Risk  (4/2/2025)    Received from [facility]    Housing Stability Vital Sign     Unable to Pay for Housing in the Last Year: No     Number of Times Moved in the Last Year: 0     Homeless in the Last Year: No     Fam Hx:  Family History[3]   Allergies:  RX Allergies[4]  Current Medications:  Medications Ordered Prior to Encounter[5]      Objective   Vitals:  There were no vitals taken for this visit.    Physical Exam:  GEN: No acute distress. Alert, awake and conversive  HEENT: Atraumatic. Sclera anicteric. Moist mucous membranes.  RESP: Breathing non-labored, equal chest rise. On RA.  CV: Regular rate, normotensive  GI: Abdomen soft, nondistended, nontender  : Voiding spontaneously.  MSK: No gross deformities. Moves all extremities spontaneously.  NEURO: Alert and oriented x3. No focal deficits.  PSYCH: Appropriate mood and affect.  SKIN: No rashes or lesions     Labs within past 24h:  No results found for this or any previous visit (from the past 24 hours).    Imaging within past 24h:  Imaging  No results found.    Cardiology, Vascular, and Other Imaging  No other imaging results found for the past 2 days      I have reviewed the imaging above as it pertains to the patient's surgical concerns and agree  with the radiologist's interpretation.         ASSESSMENT  Luis Panda is a 60 y.o. female who presented to [facility] as a direct admit for potential kidney transplant. Patient is in stable condition.     PLAN:  - To OR at for BD DDKT with  . Consent to be obtained prior.  - NPO now. mIVF while NPO  - Ordered STAT basic labs, serologies, infectious workup including CXR  - Holding home meds pending OR.    Patient discussed and staffed with Dr. Olegario Gallo, who agrees with plan as above.    Mili Costello MD  PGY-1 General Surgery  Transplant Surgery [id number]         [1]   Past Medical History:  Diagnosis Date    CKD (chronic kidney disease)     Diabetes mellitus (Multi)     HLD (hyperlipidemia)     Hypertension    [2]   Past Surgical History:  Procedure Laterality Date    AV FISTULA PLACEMENT Left     approx 2019    MR HEAD ANGIO WO IV CONTRAST  08/07/2023    MR HEAD ANGIO WO IV CONTRAST 8/7/2023 [facility] [id number] MRI   [3] No family history on file.  [4]   Allergies  Allergen Reactions    Amlodipine Unknown     LEG SWELLING    Oxycodone Unknown     Dizziness, weakness   [5]   No current facility-administered medications on file prior to encounter.     Current Outpatient Medications on File Prior to Encounter   Medication Sig Dispense Refill    aspirin 81 mg EC tablet       calcitriol (Rocaltrol) 0.25 mcg capsule Take 1 capsule (0.25 mcg) by mouth once daily.      calcium carbonate-vitamin D3 (Oyster Shell) 250 mg-3.125 mcg (125 unit) tablet       carvedilol (Coreg) 25 mg tablet Take 1 tablet (25 mg) by mouth 2 times a day with meals.      cholecalciferol (Vitamin D-3) 50 MCG (2000 UT) tablet Take 1 tablet (50 mcg) by mouth once daily.      eltrombopag olamine (Promacta) 12.5 mg tablet Take 1 tablet (12.5 mg) by mouth once daily in the morning. Take before meals. Take on an empty stomach, 1 hour before or 2 hours afer a meal. 30 tablet 11    ferrous sulfate, 325 mg ferrous sulfate, tablet Take 1 tablet by mouth 3 times a  day.      glipiZIDE (Glucotrol) 5 mg tablet Take 1 tablet (5 mg) by mouth once daily.      hydrALAZINE (Apresoline) 25 mg tablet Take 1 tablet (25 mg) by mouth 3 times a day.      omeprazole (PriLOSEC) 40 mg DR capsule Take 1 capsule (40 mg) by mouth once daily.      ondansetron ODT (Zofran-ODT) 4 mg disintegrating tablet 1 tablet (4 mg) every 6 hours.      simvastatin (Zocor) 20 mg tablet Take 1 tablet (20 mg) by mouth once daily.      sodium bicarbonate 650 mg tablet Take 1 tablet (650 mg) by mouth.      traZODone (Desyrel) 50 mg tablet Take 1 tablet (50 mg) by mouth as needed at bedtime for sleep.      TRUEplus Lancets 33 gauge misc

## 2025-06-10 NOTE — BRIEF OP NOTE
Date: 6/10/2025  OR Location: [facility] OR    Name: Luis Panda, : 1965, Age: 60 y.o., MRN: [id number], Sex: female    Diagnosis  Pre-op Diagnosis      * ESRD (end stage renal disease) (Multi) [N18.6] Post-op Diagnosis     * ESRD (end stage renal disease) (Multi) [N18.6]     Procedures  TRANSPLANT, KIDNEY  98151 - SC RENAL ALTRNSPLJ IMPLTJ GRF W/O RCP NEPHRECTOMY   donor kidney transplant to the right pelvis    Surgeons      * Debbie Gallo - Primary    Resident/Fellow/Other Assistant:  Surgeons and Role:     * Rahat Haynes MD - Assisting    Staff:   Circulator: Porsche Martub Person: Avis Morse Person: Neena  Relief Circulator: Camalynn  Relief Scrub: Damaris    Anesthesia Staff: Anesthesiologist: Keith Shaffer MD  C-AA: STEVEN Love  Anesthesia Resident: Kala Camarillo MD    Procedure Summary  Anesthesia: General  ASA: III  Estimated Blood Loss: 50mL  Intra-op Medications:   Administrations occurring from 1100 to 1640 on 06/10/25:   Medication Name Total Dose   anti-thymocyte globulin rabbit (Thymoglobulin) 100 mg, hydrocortisone sodium succinate (PF) (Solu-CORTEF) 20 mg, heparin 1,000 Units in sodium chloride 0.9% 500 mL  mg   acetaminophen (Ofirmev) injection 1,000 mg 1,000 mg   acetaminophen (Tylenol) tablet 975 mg 975 mg   ceFAZolin (Ancef) 2 g in dextrose (iso)  mL 2 g   gabapentin (Neurontin) capsule 300 mg 300 mg   albumin human bottle 5% 250 mL   aprepitant (Emend) 40 mg capsule 40 mg   calcium chloride 10% 0.2 g   diphenhydrAMINE 50 mg/mL 50 mg   ePHEDrine injection 5 mg   fentaNYL (Sublimaze) injection 50 mcg/mL 150 mcg   furosemide (Lasix) 10 mg/mL 80 mg   glycopyrrolate (Robinul) injection 0.2 mg   heparin 1,000 units/mL 2,000 Units   LR bolus Cannot be calculated   lidocaine (Xylocaine) injection 2 % 70 mg   methylPREDNISolone sodium succinate (SOLU-Medrol) 500 mg 500 mg   midazolam PF (Versed) injection 1 mg/mL 2 mg   ondansetron 2 mg/mL 4  mg   phenylephrine 40 mcg/mL syringe 10 mL 320 mcg   propofol (Diprivan) injection 10 mg/mL 200 mg   rocuronium (ZeMuron) 50 mg/5 mL injection 80 mg   sodium bicaronate syringe 8.4% (1 mEq/mL) 50 mEq   sugammadex (Bridion) 200 mg/2 mL injection 200 mg              Anesthesia Record               Intraprocedure I/O Totals          Intake    PRBC 350.00 mL    LR bolus 2500.00 mL    anti-thymocyte globulin rabbit (Thymoglobulin) 100 mg, hydrocortisone sodium succinate (PF) (Solu-CORTEF) 20 mg, heparin 1,000 Units in sodium chloride 0.9% 500 mL .40 mL    Total Intake 3371.4 mL       Output    Urine 151 mL    Est. Blood Loss 20 mL    Total Output 171 mL       Net    Net Volume 3200.4 mL          Specimen: No specimens collected       Findings:  donor left kidney transplanted to the right iliac fossa. Organ arrival at 1126, out of ice 1419, reperfusion at 1456    Complications:  None; patient tolerated the procedure well.     Disposition: PACU - hemodynamically stable.  Condition: stable  Specimens Collected: No specimens collected    Rahat Haynes MD, PhD  Vascular Surgery, PGY3  Transplant [id number]      Attending Attestation: I was present and scrubbed for the entire procedure.    Debbie Melvin Sabino  Phone Number: [phone]

## 2025-06-11 ENCOUNTER — LAB REQUISITION (OUTPATIENT)
Dept: LAB | Facility: CLINIC | Age: 60
End: 2025-06-11
Payer: MEDICAID

## 2025-06-11 ENCOUNTER — DOCUMENTATION (OUTPATIENT)
Facility: HOSPITAL | Age: 60
End: 2025-06-11
Payer: MEDICAID

## 2025-06-11 DIAGNOSIS — Z94.0 KIDNEY TRANSPLANT STATUS: ICD-10-CM

## 2025-06-11 LAB
25(OH)D3 SERPL-MCNC: 54 NG/ML (ref 30–100)
ALBUMIN SERPL BCP-MCNC: 3 G/DL (ref 3.4–5)
ANION GAP SERPL CALC-SCNC: 14 MMOL/L (ref 10–20)
BASOPHILS # BLD AUTO: 0.01 X10*3/UL (ref 0–0.1)
BASOPHILS NFR BLD AUTO: 0.1 %
BUN SERPL-MCNC: 49 MG/DL (ref 6–23)
CALCIUM SERPL-MCNC: 7.3 MG/DL (ref 8.6–10.6)
CHLORIDE SERPL-SCNC: 111 MMOL/L (ref 98–107)
CO2 SERPL-SCNC: 19 MMOL/L (ref 21–32)
CREAT SERPL-MCNC: 6.79 MG/DL (ref 0.5–1.05)
EGFRCR SERPLBLD CKD-EPI 2021: 6 ML/MIN/1.73M*2
EOSINOPHIL # BLD AUTO: 0 X10*3/UL (ref 0–0.7)
EOSINOPHIL NFR BLD AUTO: 0 %
ERYTHROCYTE [DISTWIDTH] IN BLOOD BY AUTOMATED COUNT: 15.7 % (ref 11.5–14.5)
ERYTHROCYTE [DISTWIDTH] IN BLOOD BY AUTOMATED COUNT: 15.9 % (ref 11.5–14.5)
FLOW ALLOCROSSMATCH PEAK: NORMAL
GLUCOSE BLD MANUAL STRIP-MCNC: 160 MG/DL (ref 74–99)
GLUCOSE BLD MANUAL STRIP-MCNC: 171 MG/DL (ref 74–99)
GLUCOSE BLD MANUAL STRIP-MCNC: 173 MG/DL (ref 74–99)
GLUCOSE BLD MANUAL STRIP-MCNC: 179 MG/DL (ref 74–99)
GLUCOSE BLD MANUAL STRIP-MCNC: 181 MG/DL (ref 74–99)
GLUCOSE BLD MANUAL STRIP-MCNC: 181 MG/DL (ref 74–99)
GLUCOSE SERPL-MCNC: 174 MG/DL (ref 74–99)
HCT VFR BLD AUTO: 21.1 % (ref 36–46)
HCT VFR BLD AUTO: 22.1 % (ref 36–46)
HCV RNA SERPL NAA+PROBE-ACNC: NOT DETECTED K[IU]/ML
HCV RNA SERPL NAA+PROBE-LOG IU: NORMAL {LOG_IU}/ML
HGB BLD-MCNC: 7.1 G/DL (ref 12–16)
HGB BLD-MCNC: 7.5 G/DL (ref 12–16)
HLA RESULTS: NORMAL
HLA RESULTS: NORMAL
HLA-A+B+C AB NFR SER: NORMAL %
HLA-DP+DQ+DR AB NFR SER: NORMAL %
IMM GRANULOCYTES # BLD AUTO: 0.03 X10*3/UL (ref 0–0.7)
IMM GRANULOCYTES NFR BLD AUTO: 0.4 % (ref 0–0.9)
LYMPHOCYTES # BLD AUTO: 0.05 X10*3/UL (ref 1.2–4.8)
LYMPHOCYTES NFR BLD AUTO: 0.6 %
MAGNESIUM SERPL-MCNC: 1.7 MG/DL (ref 1.6–2.4)
MCH RBC QN AUTO: 31.1 PG (ref 26–34)
MCH RBC QN AUTO: 32.4 PG (ref 26–34)
MCHC RBC AUTO-ENTMCNC: 33.6 G/DL (ref 32–36)
MCHC RBC AUTO-ENTMCNC: 33.9 G/DL (ref 32–36)
MCV RBC AUTO: 92 FL (ref 80–100)
MCV RBC AUTO: 96 FL (ref 80–100)
MONOCYTES # BLD AUTO: 0.13 X10*3/UL (ref 0.1–1)
MONOCYTES NFR BLD AUTO: 1.7 %
NEUTROPHILS # BLD AUTO: 7.61 X10*3/UL (ref 1.2–7.7)
NEUTROPHILS NFR BLD AUTO: 97.2 %
NRBC BLD-RTO: 0 /100 WBCS (ref 0–0)
NRBC BLD-RTO: 0 /100 WBCS (ref 0–0)
PHOSPHATE SERPL-MCNC: 4.8 MG/DL (ref 2.5–4.9)
PLATELET # BLD AUTO: 108 X10*3/UL (ref 150–450)
PLATELET # BLD AUTO: 65 X10*3/UL (ref 150–450)
POTASSIUM SERPL-SCNC: 3.7 MMOL/L (ref 3.5–5.3)
RBC # BLD AUTO: 2.19 X10*6/UL (ref 4–5.2)
RBC # BLD AUTO: 2.41 X10*6/UL (ref 4–5.2)
SODIUM SERPL-SCNC: 140 MMOL/L (ref 136–145)
WBC # BLD AUTO: 7.8 X10*3/UL (ref 4.4–11.3)
WBC # BLD AUTO: 9.5 X10*3/UL (ref 4.4–11.3)

## 2025-06-11 PROCEDURE — 2500000001 HC RX 250 WO HCPCS SELF ADMINISTERED DRUGS (ALT 637 FOR MEDICARE OP): Mod: SE

## 2025-06-11 PROCEDURE — 82947 ASSAY GLUCOSE BLOOD QUANT: CPT

## 2025-06-11 PROCEDURE — P9037 PLATE PHERES LEUKOREDU IRRAD: HCPCS

## 2025-06-11 PROCEDURE — 2500000004 HC RX 250 GENERAL PHARMACY W/ HCPCS (ALT 636 FOR OP/ED): Mod: SE

## 2025-06-11 PROCEDURE — 1200000002 HC GENERAL ROOM WITH TELEMETRY DAILY

## 2025-06-11 PROCEDURE — P9016 RBC LEUKOCYTES REDUCED: HCPCS

## 2025-06-11 PROCEDURE — 85027 COMPLETE CBC AUTOMATED: CPT

## 2025-06-11 PROCEDURE — 36415 COLL VENOUS BLD VENIPUNCTURE: CPT

## 2025-06-11 PROCEDURE — 99254 IP/OBS CNSLTJ NEW/EST MOD 60: CPT

## 2025-06-11 PROCEDURE — 83735 ASSAY OF MAGNESIUM: CPT

## 2025-06-11 PROCEDURE — 36430 TRANSFUSION BLD/BLD COMPNT: CPT

## 2025-06-11 PROCEDURE — 80069 RENAL FUNCTION PANEL: CPT

## 2025-06-11 PROCEDURE — 2500000002 HC RX 250 W HCPCS SELF ADMINISTERED DRUGS (ALT 637 FOR MEDICARE OP, ALT 636 FOR OP/ED): Mod: SE

## 2025-06-11 PROCEDURE — 97161 PT EVAL LOW COMPLEX 20 MIN: CPT | Mod: GP

## 2025-06-11 PROCEDURE — 99232 SBSQ HOSP IP/OBS MODERATE 35: CPT | Performed by: SURGERY

## 2025-06-11 PROCEDURE — 85025 COMPLETE CBC W/AUTO DIFF WBC: CPT

## 2025-06-11 RX ORDER — INSULIN LISPRO 100 [IU]/ML
0-10 INJECTION, SOLUTION INTRAVENOUS; SUBCUTANEOUS
Status: DISCONTINUED | OUTPATIENT
Start: 2025-06-11 | End: 2025-06-16 | Stop reason: HOSPADM

## 2025-06-11 RX ORDER — SIMVASTATIN 20 MG/1
20 TABLET, FILM COATED ORAL DAILY
Status: DISCONTINUED | OUTPATIENT
Start: 2025-06-11 | End: 2025-06-16 | Stop reason: HOSPADM

## 2025-06-11 RX ORDER — PREDNISONE 10 MG/1
20 TABLET ORAL DAILY
Status: DISCONTINUED | OUTPATIENT
Start: 2025-06-15 | End: 2025-06-16 | Stop reason: HOSPADM

## 2025-06-11 RX ORDER — CALCIUM GLUCONATE 20 MG/ML
1 INJECTION, SOLUTION INTRAVENOUS EVERY 4 HOURS
Status: DISPENSED | OUTPATIENT
Start: 2025-06-11 | End: 2025-06-11

## 2025-06-11 RX ORDER — ONDANSETRON 4 MG/1
4 TABLET, FILM COATED ORAL EVERY 6 HOURS PRN
COMMUNITY
End: 2025-06-16 | Stop reason: HOSPADM

## 2025-06-11 RX ORDER — ATOVAQUONE 750 MG/5ML
1500 SUSPENSION ORAL DAILY
Status: DISCONTINUED | OUTPATIENT
Start: 2025-06-11 | End: 2025-06-16 | Stop reason: HOSPADM

## 2025-06-11 RX ORDER — CLOTRIMAZOLE 10 MG/1
10 LOZENGE ORAL
Status: DISCONTINUED | OUTPATIENT
Start: 2025-06-11 | End: 2025-06-16 | Stop reason: HOSPADM

## 2025-06-11 RX ORDER — MAGNESIUM SULFATE HEPTAHYDRATE 40 MG/ML
2 INJECTION, SOLUTION INTRAVENOUS ONCE
Status: COMPLETED | OUTPATIENT
Start: 2025-06-11 | End: 2025-06-11

## 2025-06-11 RX ORDER — SEVELAMER CARBONATE 800 MG/1
800 TABLET, FILM COATED ORAL
COMMUNITY
Start: 2025-02-18 | End: 2025-06-16 | Stop reason: HOSPADM

## 2025-06-11 RX ORDER — FUROSEMIDE 10 MG/ML
60 INJECTION INTRAMUSCULAR; INTRAVENOUS ONCE
Status: COMPLETED | OUTPATIENT
Start: 2025-06-11 | End: 2025-06-11

## 2025-06-11 RX ORDER — MYCOPHENOLATE MOFETIL 250 MG/1
500 CAPSULE ORAL EVERY 12 HOURS
Status: DISCONTINUED | OUTPATIENT
Start: 2025-06-11 | End: 2025-06-16 | Stop reason: HOSPADM

## 2025-06-11 RX ADMIN — CLOTRIMAZOLE 10 MG: 10 LOZENGE ORAL at 17:21

## 2025-06-11 RX ADMIN — CARVEDILOL 25 MG: 25 TABLET, FILM COATED ORAL at 08:22

## 2025-06-11 RX ADMIN — DEXTROSE 250 MG: 50 INJECTION, SOLUTION INTRAVENOUS at 08:22

## 2025-06-11 RX ADMIN — MYCOPHENOLATE MOFETIL 500 MG: 250 CAPSULE ORAL at 18:18

## 2025-06-11 RX ADMIN — ACETAMINOPHEN 650 MG: 325 TABLET ORAL at 17:21

## 2025-06-11 RX ADMIN — INSULIN LISPRO 1 UNITS: 100 INJECTION, SOLUTION INTRAVENOUS; SUBCUTANEOUS at 08:30

## 2025-06-11 RX ADMIN — INSULIN LISPRO 1 UNITS: 100 INJECTION, SOLUTION INTRAVENOUS; SUBCUTANEOUS at 13:14

## 2025-06-11 RX ADMIN — MAGNESIUM SULFATE HEPTAHYDRATE 2 G: 40 INJECTION, SOLUTION INTRAVENOUS at 08:24

## 2025-06-11 RX ADMIN — CEFAZOLIN SODIUM 2 G: 2 INJECTION, SOLUTION INTRAVENOUS at 11:41

## 2025-06-11 RX ADMIN — ACETAMINOPHEN 650 MG: 325 TABLET ORAL at 06:02

## 2025-06-11 RX ADMIN — SODIUM CHLORIDE 20 MG: 9 INJECTION, SOLUTION INTRAVENOUS at 12:46

## 2025-06-11 RX ADMIN — CARVEDILOL 25 MG: 25 TABLET, FILM COATED ORAL at 20:53

## 2025-06-11 RX ADMIN — CLOTRIMAZOLE 10 MG: 10 LOZENGE ORAL at 11:59

## 2025-06-11 RX ADMIN — SIMVASTATIN 20 MG: 20 TABLET, FILM COATED ORAL at 20:54

## 2025-06-11 RX ADMIN — MYCOPHENOLATE MOFETIL 1000 MG: 250 CAPSULE ORAL at 06:02

## 2025-06-11 RX ADMIN — FUROSEMIDE 60 MG: 10 INJECTION, SOLUTION INTRAMUSCULAR; INTRAVENOUS at 11:52

## 2025-06-11 RX ADMIN — OXYCODONE 5 MG: 5 TABLET ORAL at 08:50

## 2025-06-11 RX ADMIN — PANTOPRAZOLE SODIUM 40 MG: 40 TABLET, DELAYED RELEASE ORAL at 06:02

## 2025-06-11 RX ADMIN — DARBEPOETIN ALFA 100 MCG: 100 INJECTION, SOLUTION INTRAVENOUS; SUBCUTANEOUS at 11:39

## 2025-06-11 RX ADMIN — PANTOPRAZOLE SODIUM 40 MG: 40 TABLET, DELAYED RELEASE ORAL at 17:21

## 2025-06-11 RX ADMIN — CEFAZOLIN SODIUM 2 G: 2 INJECTION, SOLUTION INTRAVENOUS at 03:54

## 2025-06-11 RX ADMIN — ACETAMINOPHEN 650 MG: 325 TABLET ORAL at 11:39

## 2025-06-11 RX ADMIN — DEXTROSE 250 MG: 50 INJECTION, SOLUTION INTRAVENOUS at 11:40

## 2025-06-11 RX ADMIN — CLOTRIMAZOLE 10 MG: 10 LOZENGE ORAL at 08:22

## 2025-06-11 RX ADMIN — INSULIN LISPRO 2 UNITS: 100 INJECTION, SOLUTION INTRAVENOUS; SUBCUTANEOUS at 18:18

## 2025-06-11 ASSESSMENT — PAIN - FUNCTIONAL ASSESSMENT
PAIN_FUNCTIONAL_ASSESSMENT: 0-10

## 2025-06-11 ASSESSMENT — COGNITIVE AND FUNCTIONAL STATUS - GENERAL
CLIMB 3 TO 5 STEPS WITH RAILING: A LITTLE
MOVING TO AND FROM BED TO CHAIR: A LITTLE
MOBILITY SCORE: 24
MOVING FROM LYING ON BACK TO SITTING ON SIDE OF FLAT BED WITH BEDRAILS: A LITTLE
MOBILITY SCORE: 18
STANDING UP FROM CHAIR USING ARMS: A LITTLE
WALKING IN HOSPITAL ROOM: A LITTLE
TURNING FROM BACK TO SIDE WHILE IN FLAT BAD: A LITTLE
DAILY ACTIVITIY SCORE: 24

## 2025-06-11 ASSESSMENT — ACTIVITIES OF DAILY LIVING (ADL): ADL_ASSISTANCE: INDEPENDENT

## 2025-06-11 ASSESSMENT — PAIN SCALES - GENERAL
PAINLEVEL_OUTOF10: 6
PAINLEVEL_OUTOF10: 0 - NO PAIN

## 2025-06-11 ASSESSMENT — PAIN DESCRIPTION - LOCATION: LOCATION: ABDOMEN

## 2025-06-11 NOTE — CONSULTS
Nutrition Diet Education:   Nutrition Assessment    Reason for Assessment: Provider consult order    Patient is a 60 y.o. female on day 2 of admission presenting with PMHx significant for ESRD, ITP, PCKD, HTN, and cervical radiculopathy. Pt s/p DDKT 6/10.         Education Documentation  Nutrition Related Education, taught by Manfred Mcguire RDN, LD at 6/11/2025  3:02 PM.  Learner: Family, Patient  Readiness: Acceptance  Method: Handout  Response: Verbalizes Understanding  Comment: Spoke with pt son over phone to provide food safety education s/p transplant. Discussed cooking meat to proper cooking temperature and to avoid any raw meat or sushi. Also discussed avoiding any raw/unpasteurized dairy products and to heat deli meat to steaming prior to consuming. Also discussed properly washing fruits and vegetables prior to consuming and to properly wash surfaces and prevent cross-contamination by using clean cutting boards. Provided pt with English version of food safety booklet for son and education in [facility].              Time Spent (min): 30 minutes

## 2025-06-11 NOTE — SIGNIFICANT EVENT
Post-op check note    S/p DDKT    Subjective:   Pain Is well controlled  She has not experienced any chest pain, SOB, heartburn sx.  Abd pain is in RLQ surroudning drain and incision site  Drain is serosang holding suction. Does have some serosang drainage around drain as well that stained abd binder  UOP is 150-175 every hour    Objective:  Vitals:    06/10/25 1947   BP: 179/81   Pulse: 96   Resp: 17   Temp:    SpO2: 100%     General: in NAD, laying in bed comfortbaly, family members at bedside  HEENT: PERRL, iEOM, MMM  CV: RRR on peripheral palpation, cap refill < 3 sec in all extremities  respL on 2 L NC satting appropriately  Abdomen: soft, midlly distended,ULI drain in RLQ with serosang output. OR dressing with mild shadowing  MSK: Moving all extremities without issue  Neuro: no focal deficits  Psych: normal behavior and mood    Assessment/plan:  Patient is progressing appropriately post-op  Pain is controlled and limitedto RLQ  Given 1 amp of bicarb IV injection      - cont mIVF and 1:1 fluid replacements  - maintain ULI bulb to self suction  - NPO overngiht  Wean NC as tolerated  - pain control with  tylenol and oxy  - coreg 25 Q12. PRN hydral available  - solumedrol taper  - periop ancef  - MMF1g dose tonight, then BID  - AM labs    Selam Coronado MD  PGY1 Abdominal Transplant Surgery  Pager [phone]  Available via secure chat

## 2025-06-11 NOTE — PROGRESS NOTES
Pharmacy Medication History Review    Luis Panda is a 60 y.o. female admitted for Kidney transplant candidate. Pharmacy reviewed the patient's [id number] medications and allergies for accuracy.    Medications ADDED:  Renvela  Medications CHANGED:  Aspirin one tablet daily  Calcitriol from one capsule daily to three times weekly on Mon, Wed, & Fri  Vitamin D3 from one tablet daily to twice daily  Glipizide from 5 mg to 2.5 mg  Hydralazine from 25 mg to 50 mg  Zofran from 4 mg ODT to 4 mg IR  Sodium bicarbonate 2 tablets three times daily  Trazodone from as needed at bedtime to at bedtime  Medications REMOVED:   Calcium carbonate & vitamin D3     The list below reflects the updated PTA list.   Prior to Admission Medications   Prescriptions Informant   TRUEplus Lancets 33 gauge misc Other, Family Member   aspirin 81 mg EC tablet Other, Family Member   Sig: Take 1 tablet (81 mg) by mouth once daily.   calcitriol (Rocaltrol) 0.25 mcg capsule Other, Family Member   Sig: Take 1 capsule (0.25 mcg) by mouth once a day on Monday, Wednesday, and Friday. 3 times weekyl   carvedilol (Coreg) 25 mg tablet Other, Family Member   Sig: Take 1 tablet (25 mg) by mouth 2 times daily (morning and late afternoon).   cholecalciferol (Vitamin D-3) 50 MCG (2000 UT) tablet Other, Family Member   Sig: Take 1 tablet (50 mcg) by mouth 2 times a day.   eltrombopag olamine (Promacta) 12.5 mg tablet Other, Family Member   Sig: Take 1 tablet (12.5 mg) by mouth once daily in the morning. Take before meals. Take on an empty stomach, 1 hour before or 2 hours afer a meal.  The [facility] prepackaged dosing has this packaged with the evening medications    ferrous sulfate, 325 mg ferrous sulfate, tablet Other, Family Member   Sig: Take 1 tablet by mouth 3 times a day.   glipiZIDE 2.5 mg tablet Other, Family Member   Sig: Take 1 tablet by mouth once daily.   hydrALAZINE (Apresoline) 50 mg tablet Other, Family Member   Sig: Take 1 tablet (50 mg)  by mouth 3 times a day.   omeprazole (PriLOSEC) 40 mg DR capsule Other, Family Member   Sig: Take 1 capsule (40 mg) by mouth once daily.   ondansetron (Zofran) 4 mg tablet Other   Sig: Take 1 tablet (4 mg) by mouth every 6 hours if needed for nausea or vomiting.  Per pharmacy dispense history, last dispensed 3/31/25 for 7-day supply    sevelamer carbonate (Renvela) 800 mg tablet Family Member, Other   Sig: Take 1 tablet (800 mg) by mouth 3 times daily (morning, midday, late afternoon).   simvastatin (Zocor) 20 mg tablet Other, Family Member   Sig: Take 1 tablet (20 mg) by mouth once daily at bedtime.   sodium bicarbonate 650 mg tablet Other, Family Member   Sig: Take 2 tablets (1,300 mg) by mouth 3 times a day.   traZODone (Desyrel) 50 mg tablet Other, Family Member   Sig: Take 1 tablet (50 mg) by mouth once daily at bedtime.      Facility-Administered Medications: None        The list below reflects the updated allergy list. Please review each documented allergy for additional clarification and justification.  Allergies  Reviewed by Denilson Yates, DeeD on 6/11/2025        Severity Reactions Comments    Amlodipine Not Specified Other LEG SWELLING    Oxycodone Not Specified Other Dizziness, weakness            Patient declines M2B at discharge.     Sources:   [facility]  Pharmacy dispense history  Chart Review  Care Everywhere  [facility] Carbone  ID #[id number]  [facility]ph[facility] progress note (Care Everywhere)  4/4/25 [facility] discharge summary  2/20/25 [facility] Family Medicine Telephone encounter    Additional Comments:  No recent dispense history per [facility] report  I spoke with the patient, patient's spouse and grandson using [facility]  service  The patient's grandson reported the patient receives medication via mail and provided a box containing prepackaged medications from [facility] with morning, noon, evening and bedtime prepackaged doses  The box was dated from  "6/7/25 to 6/20/25 and indicated box 1 of 2. The patient's grandson informed that the 2nd box is at home and is the supply of medication for the remainder of the month  The box was left with the patient at bedside at the conclusion of the med rec interview process  PTA med list updated per sources cited above, as well as the [facility] pharmacy box of prepackaged medications      Denilson Yates, PharmD  Transitions of Care Pharmacist  06/11/25     Secure Chat preferred   If no response call [id number] or [employer] \"Med Rec\"    "

## 2025-06-11 NOTE — PROGRESS NOTES
Transplant event completed.  Verified serologies and cross clamp time with [facility].  Verified recipient is listed in the transplant phase in Epic.

## 2025-06-11 NOTE — PROGRESS NOTES
Pharmacy Admission Order Reconciliation Review    Luis Panda is a 60 y.o. female admitted for Kidney transplant candidate. Pharmacy reviewed the patient's unreconciled admission medications.    Prior to admission medications that were reviewed and acted on by the pharmacist include:  prilosec  zofran  These medications have been reconciled.     Any other unreconcilied medications have been addressed and will be ordered or held by the patient's medical team. Medications addressed by the pharmacist may be added or changed by the patient's medical team at any time.    Denilson Yates, PharmD   Transitions of Care Pharmacist    Please reach out via Secure Chat for questions

## 2025-06-11 NOTE — NURSING NOTE
2 units of platelets ordered, one unit given, unit 2 on hold with blood bank to determine if warranted.  Transplant team aware.

## 2025-06-11 NOTE — CONSULTS
Luis Panda   60 y.o.      Vitals:    25 0600   Weight: 70.6 kg (155 lb 10.3 oz)      MRN/Room: [id number]    HPI:  Luis Panda is a 60 y.o. 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 Transplant Nephrology following along with Transplant surgery team.     Post op course has been uneventful thus far. Immediate graft function  Patient is [place] speaking.           /69   Pulse 70   Temp 36.2 °C (97.2 °F)   Resp 17   Ht 1.524 m (5')   Wt 70.6 kg (155 lb 10.3 oz)   SpO2 91%   BMI 30.40 kg/m²      Medical History[1]   Surgical History[2]   Family History[3]  Social History     Socioeconomic History    Marital status:      Spouse name: Not on file    Number of children: Not on file    Years of education: Not on file    Highest education level: Not on file   Occupational History    Not on file   Tobacco Use    Smoking status: Former     Current packs/day: 0.00     Types: Cigarettes     Quit date:      Years since quittin.4     Passive exposure: Never    Smokeless tobacco: Never   Substance and Sexual Activity    Alcohol use: Not Currently    Drug use: Never    Sexual activity: Not on file   Other Topics Concern    Not on file   Social History Narrative    Not on file     Social Drivers of Health     Financial Resource Strain: Low Risk  (2025)    Overall Financial Resource Strain (CARDIA)     Difficulty of Paying Living Expenses: Not hard at all   Food Insecurity: No Food Insecurity (6/10/2025)    Hunger Vital Sign     Worried About Running Out of Food in the Last Year: Never true     Ran Out of Food in the Last Year: Never true   Transportation Needs: No Transportation Needs (2025)    PRAPARE - Transportation     Lack of Transportation (Medical): No     Lack of Transportation (Non-Medical): No   Physical Activity: Not on file   Stress: Not on file   Social Connections: Not on file   Intimate Partner Violence:  Not At Risk (6/10/2025)    Humiliation, Afraid, Rape, and Kick questionnaire     Fear of Current or Ex-Partner: No     Emotionally Abused: No     Physically Abused: No     Sexually Abused: No   Housing Stability: Low Risk  (6/11/2025)    Housing Stability Vital Sign     Unable to Pay for Housing in the Last Year: No     Number of Times Moved in the Last Year: 0     Homeless in the Last Year: No       Allergies[4]     Prior to Admission Medications   Prescriptions Last Dose Informant Patient Reported? Taking?   TRUEplus Lancets 33 gauge misc  Other, Family Member Yes No   aspirin 81 mg EC tablet  Other, Family Member Yes No   Sig: Take 1 tablet (81 mg) by mouth once daily.   calcitriol (Rocaltrol) 0.25 mcg capsule  Other, Family Member Yes No   Sig: Take 1 capsule (0.25 mcg) by mouth once a day on Monday, Wednesday, and Friday. 3 times weekyl   carvedilol (Coreg) 25 mg tablet  Other, Family Member Yes No   Sig: Take 1 tablet (25 mg) by mouth 2 times daily (morning and late afternoon).   cholecalciferol (Vitamin D-3) 50 MCG (2000 UT) tablet  Other, Family Member Yes No   Sig: Take 1 tablet (50 mcg) by mouth 2 times a day.   eltrombopag olamine (Promacta) 12.5 mg tablet  Other, Family Member No No   Sig: Take 1 tablet (12.5 mg) by mouth once daily in the morning. Take before meals. Take on an empty stomach, 1 hour before or 2 hours afer a meal.   ferrous sulfate, 325 mg ferrous sulfate, tablet  Other, Family Member Yes No   Sig: Take 1 tablet by mouth 3 times a day.   glipiZIDE 2.5 mg tablet  Other, Family Member Yes No   Sig: Take 1 tablet by mouth once daily.   hydrALAZINE (Apresoline) 50 mg tablet  Other, Family Member Yes No   Sig: Take 1 tablet (50 mg) by mouth 3 times a day.   omeprazole (PriLOSEC) 40 mg DR capsule  Other, Family Member Yes No   Sig: Take 1 capsule (40 mg) by mouth once daily.   ondansetron (Zofran) 4 mg tablet  Other Yes No   Sig: Take 1 tablet (4 mg) by mouth every 6 hours if needed for nausea  or vomiting.   sevelamer carbonate (Renvela) 800 mg tablet  Family Member, Other Yes Yes   Sig: Take 1 tablet (800 mg) by mouth 3 times daily (morning, midday, late afternoon).   simvastatin (Zocor) 20 mg tablet  Other, Family Member Yes No   Sig: Take 1 tablet (20 mg) by mouth once daily at bedtime.   sodium bicarbonate 650 mg tablet  Other, Family Member Yes No   Sig: Take 2 tablets (1,300 mg) by mouth 3 times a day.   traZODone (Desyrel) 50 mg tablet  Other, Family Member Yes No   Sig: Take 1 tablet (50 mg) by mouth once daily at bedtime.      Facility-Administered Medications: None        Meds:   acetaminophen, 650 mg, q6h NARGIS  atovaquone, 1,500 mg, Daily  carvedilol, 25 mg, q12h  clotrimazole, 10 mg, TID  insulin lispro, 0-10 Units, TID AC  [START ON 6/14/2025] methylPREDNISolone sodium succinate (PF), 125 mg, Once  [START ON 6/13/2025] methylPREDNISolone sodium succinate (PF), 250 mg, Once  [START ON 6/12/2025] methylPREDNISolone sodium succinate (PF), 500 mg, Once  mycophenolate, 500 mg, q12h  pantoprazole, 40 mg, BID AC  [START ON 6/15/2025] predniSONE, 20 mg, Daily  simvastatin, 20 mg, Daily         [START ON 6/12/2025] acetaminophen, 650 mg, q6h PRN  dextrose, 12.5 g, q15 min PRN  dextrose, 25 g, q15 min PRN  glucagon, 1 mg, q15 min PRN  glucagon, 1 mg, q15 min PRN  hydrALAZINE, 25 mg, q8h PRN  HYDROmorphone, 0.2 mg, q3h PRN  naloxone, 0.2 mg, q5 min PRN  oxyCODONE, 10 mg, q4h PRN  oxyCODONE, 5 mg, q4h PRN  oxygen, , Continuous PRN - O2/gases  polyethylene glycol, 17 g, Daily PRN        Vitals:    06/11/25 1220   BP: 150/69   Pulse: 70   Resp: 17   Temp:    SpO2: 91%        06/09 1900 - 06/11 0659  In: 4202.4 [P.O.:30; I.V.:801]  Out: 2206 [Urine:1826; Drains:330]        General appearance: No distress  Eyes: non-icteric  Skin: no apparent rash  Heart: S1 S2 regular  Lungs: CTA bilat.  No wheezing/crackles  Abdomen: post op  Extremities: No edema bilat  : Quiroz  Neuro: No FND   ACCESS: LUE AVF    Blood  Labs:  Results for orders placed or performed during the hospital encounter of 06/10/25 (from the past 24 hours)   Blood Gas Arterial Full Panel Unsolicited   Result Value Ref Range    POCT pH, Arterial 7.24 (LL) 7.38 - 7.42 pH    POCT pCO2, Arterial 38 38 - 42 mm Hg    POCT pO2, Arterial 187 (H) 85 - 95 mm Hg    POCT SO2, Arterial 98 94 - 100 %    POCT Oxy Hemoglobin, Arterial 97.4 94.0 - 98.0 %    POCT Hematocrit Calculated, Arterial 18.0 (L) 36.0 - 46.0 %    POCT Sodium, Arterial 139 136 - 145 mmol/L    POCT Potassium, Arterial 4.3 3.5 - 5.3 mmol/L    POCT Chloride, Arterial 116 (H) 98 - 107 mmol/L    POCT Ionized Calcium, Arterial 1.21 1.10 - 1.33 mmol/L    POCT Glucose, Arterial 90 74 - 99 mg/dL    POCT Lactate, Arterial 1.6 0.4 - 2.0 mmol/L    POCT Base Excess, Arterial -10.2 (L) -2.0 - 3.0 mmol/L    POCT HCO3 Calculated, Arterial 16.3 (L) 22.0 - 26.0 mmol/L    POCT Hemoglobin, Arterial 6.0 (LL) 12.0 - 16.0 g/dL    POCT Anion Gap, Arterial 11 10 - 25 mmo/L    Patient Temperature 37.0 degrees Celsius    FiO2 50 %   Blood Gas Arterial Full Panel Unsolicited   Result Value Ref Range    POCT pH, Arterial 7.21 (LL) 7.38 - 7.42 pH    POCT pCO2, Arterial 42 38 - 42 mm Hg    POCT pO2, Arterial 158 (H) 85 - 95 mm Hg    POCT SO2, Arterial 98 94 - 100 %    POCT Oxy Hemoglobin, Arterial 96.1 94.0 - 98.0 %    POCT Hematocrit Calculated, Arterial 25.0 (L) 36.0 - 46.0 %    POCT Sodium, Arterial 138 136 - 145 mmol/L    POCT Potassium, Arterial 4.8 3.5 - 5.3 mmol/L    POCT Chloride, Arterial 115 (H) 98 - 107 mmol/L    POCT Ionized Calcium, Arterial 1.26 1.10 - 1.33 mmol/L    POCT Glucose, Arterial 116 (H) 74 - 99 mg/dL    POCT Lactate, Arterial 1.6 0.4 - 2.0 mmol/L    POCT Base Excess, Arterial -10.4 (L) -2.0 - 3.0 mmol/L    POCT HCO3 Calculated, Arterial 16.8 (L) 22.0 - 26.0 mmol/L    POCT Hemoglobin, Arterial 8.4 (L) 12.0 - 16.0 g/dL    POCT Anion Gap, Arterial 11 10 - 25 mmo/L    Patient Temperature 37.0 degrees Celsius     FiO2 50 %   POCT GLUCOSE   Result Value Ref Range    POCT Glucose 156 (H) 74 - 99 mg/dL   CBC and Auto Differential   Result Value Ref Range    WBC 5.4 4.4 - 11.3 x10*3/uL    nRBC 0.0 0.0 - 0.0 /100 WBCs    RBC 2.50 (L) 4.00 - 5.20 x10*6/uL    Hemoglobin 7.8 (L) 12.0 - 16.0 g/dL    Hematocrit 24.5 (L) 36.0 - 46.0 %    MCV 98 80 - 100 fL    MCH 31.2 26.0 - 34.0 pg    MCHC 31.8 (L) 32.0 - 36.0 g/dL    RDW 14.7 (H) 11.5 - 14.5 %    Platelets 67 (L) 150 - 450 x10*3/uL    Neutrophils % 97.5 40.0 - 80.0 %    Immature Granulocytes %, Automated 0.4 0.0 - 0.9 %    Lymphocytes % 0.6 13.0 - 44.0 %    Monocytes % 1.5 2.0 - 10.0 %    Eosinophils % 0.0 0.0 - 6.0 %    Basophils % 0.0 0.0 - 2.0 %    Neutrophils Absolute 5.24 1.20 - 7.70 x10*3/uL    Immature Granulocytes Absolute, Automated 0.02 0.00 - 0.70 x10*3/uL    Lymphocytes Absolute 0.03 (L) 1.20 - 4.80 x10*3/uL    Monocytes Absolute 0.08 (L) 0.10 - 1.00 x10*3/uL    Eosinophils Absolute 0.00 0.00 - 0.70 x10*3/uL    Basophils Absolute 0.00 0.00 - 0.10 x10*3/uL   Renal Function Panel   Result Value Ref Range    Glucose 170 (H) 74 - 99 mg/dL    Sodium 140 136 - 145 mmol/L    Potassium 4.3 3.5 - 5.3 mmol/L    Chloride 110 (H) 98 - 107 mmol/L    Bicarbonate 20 (L) 21 - 32 mmol/L    Anion Gap 14 10 - 20 mmol/L    Urea Nitrogen 53 (H) 6 - 23 mg/dL    Creatinine 7.60 (H) 0.50 - 1.05 mg/dL    eGFR 6 (L) >60 mL/min/1.73m*2    Calcium 8.7 8.6 - 10.6 mg/dL    Phosphorus 4.5 2.5 - 4.9 mg/dL    Albumin 3.5 3.4 - 5.0 g/dL   Blood Gas Arterial Full Panel   Result Value Ref Range    POCT pH, Arterial 7.26 (L) 7.38 - 7.42 pH    POCT pCO2, Arterial 38 38 - 42 mm Hg    POCT pO2, Arterial 125 (H) 85 - 95 mm Hg    POCT SO2, Arterial 97 94 - 100 %    POCT Oxy Hemoglobin, Arterial 97.0 94.0 - 98.0 %    POCT Hematocrit Calculated, Arterial 26.0 (L) 36.0 - 46.0 %    POCT Sodium, Arterial 139 136 - 145 mmol/L    POCT Potassium, Arterial 4.0 3.5 - 5.3 mmol/L    POCT Chloride, Arterial 112 (H) 98 - 107  mmol/L    POCT Ionized Calcium, Arterial 1.15 1.10 - 1.33 mmol/L    POCT Glucose, Arterial 171 (H) 74 - 99 mg/dL    POCT Lactate, Arterial 1.2 0.4 - 2.0 mmol/L    POCT Base Excess, Arterial -9.2 (L) -2.0 - 3.0 mmol/L    POCT HCO3 Calculated, Arterial 17.1 (L) 22.0 - 26.0 mmol/L    POCT Hemoglobin, Arterial 8.7 (L) 12.0 - 16.0 g/dL    POCT Anion Gap, Arterial 14 10 - 25 mmo/L    Patient Temperature 37.0 degrees Celsius    FiO2 22 %   POCT GLUCOSE   Result Value Ref Range    POCT Glucose 173 (H) 74 - 99 mg/dL   POCT GLUCOSE   Result Value Ref Range    POCT Glucose 171 (H) 74 - 99 mg/dL   Magnesium   Result Value Ref Range    Magnesium 1.70 1.60 - 2.40 mg/dL   Renal Function Panel   Result Value Ref Range    Glucose 174 (H) 74 - 99 mg/dL    Sodium 140 136 - 145 mmol/L    Potassium 3.7 3.5 - 5.3 mmol/L    Chloride 111 (H) 98 - 107 mmol/L    Bicarbonate 19 (L) 21 - 32 mmol/L    Anion Gap 14 10 - 20 mmol/L    Urea Nitrogen 49 (H) 6 - 23 mg/dL    Creatinine 6.79 (H) 0.50 - 1.05 mg/dL    eGFR 6 (L) >60 mL/min/1.73m*2    Calcium 7.3 (L) 8.6 - 10.6 mg/dL    Phosphorus 4.8 2.5 - 4.9 mg/dL    Albumin 3.0 (L) 3.4 - 5.0 g/dL   CBC and Auto Differential   Result Value Ref Range    WBC 7.8 4.4 - 11.3 x10*3/uL    nRBC 0.0 0.0 - 0.0 /100 WBCs    RBC 2.19 (L) 4.00 - 5.20 x10*6/uL    Hemoglobin 7.1 (L) 12.0 - 16.0 g/dL    Hematocrit 21.1 (L) 36.0 - 46.0 %    MCV 96 80 - 100 fL    MCH 32.4 26.0 - 34.0 pg    MCHC 33.6 32.0 - 36.0 g/dL    RDW 15.9 (H) 11.5 - 14.5 %    Platelets 65 (L) 150 - 450 x10*3/uL    Neutrophils % 97.2 40.0 - 80.0 %    Immature Granulocytes %, Automated 0.4 0.0 - 0.9 %    Lymphocytes % 0.6 13.0 - 44.0 %    Monocytes % 1.7 2.0 - 10.0 %    Eosinophils % 0.0 0.0 - 6.0 %    Basophils % 0.1 0.0 - 2.0 %    Neutrophils Absolute 7.61 1.20 - 7.70 x10*3/uL    Immature Granulocytes Absolute, Automated 0.03 0.00 - 0.70 x10*3/uL    Lymphocytes Absolute 0.05 (L) 1.20 - 4.80 x10*3/uL    Monocytes Absolute 0.13 0.10 - 1.00 x10*3/uL     Eosinophils Absolute 0.00 0.00 - 0.70 x10*3/uL    Basophils Absolute 0.01 0.00 - 0.10 x10*3/uL   POCT GLUCOSE   Result Value Ref Range    POCT Glucose 160 (H) 74 - 99 mg/dL   Prepare Platelets: 1 Units   Result Value Ref Range    PRODUCT CODE V4341S80     Unit Number [id number]     Unit ABO B     Unit RH POS     Dispense Status IS     Blood Expiration Date 6/13/2025 11:59:00 PM EDT     PRODUCT BLOOD TYPE 7300     UNIT VOLUME 287    POCT GLUCOSE   Result Value Ref Range    POCT Glucose 179 (H) 74 - 99 mg/dL        ASSESSMENT:  Luis Panda is a 60 y.o. 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 Transplant Nephrology following along with Transplant surgery team.     Post op course has been uneventful thus far. Immediate graft function  Patient is [place] speaking.     #Allograft  -PRA 0, KDPI 73%   -Serum Cr trending down 7.6--> 6.8  -Urine output: 1.8 L yesterday   -Lasix 60mg IV once today     #Immunosuppression  -As per protocol  -Simulect, IV Methylprednisone, MMF 500mg po bid    #Infectious prophylaxis  -Atovaquone, Clotrimazole     #Hemodynamics   -Carvedilol 25mg po bid     #Electrolytes  -Stable     #CKD-MBD  -Stable, no phos binder    #CKD-Anemia   -Aranesp 100mcg today     #Acid-base  -HCO3 19     Qamar Shepherd MD   Nephrology fellow            [1]   Past Medical History:  Diagnosis Date    CKD (chronic kidney disease)     Diabetes mellitus (Multi)     HLD (hyperlipidemia)     Hypertension     PONV (postoperative nausea and vomiting)    [2]   Past Surgical History:  Procedure Laterality Date    AV FISTULA PLACEMENT Left     approx 2019    MR HEAD ANGIO WO IV CONTRAST  08/07/2023    MR HEAD ANGIO WO IV CONTRAST 8/7/2023 [facility] [id number] MRI   [3] No family history on file.  [4]   Allergies  Allergen Reactions    Amlodipine Other     LEG SWELLING    Oxycodone Other     Dizziness, weakness

## 2025-06-11 NOTE — PROGRESS NOTES
06/11/25 0956   Discharge Planning   Living Arrangements Spouse/significant other   Support Systems Spouse/significant other   Assistance Needed None   Type of Residence Private residence   Home or Post Acute Services None   Expected Discharge Disposition Home   Does the patient need discharge transport arranged? Yes   RoundTrip coordination needed? Yes   Has discharge transport been arranged? No   Financial Resource Strain   How hard is it for you to pay for the very basics like food, housing, medical care, and heating? Not hard   Housing Stability   In the last 12 months, was there a time when you were not able to pay the mortgage or rent on time? N   Transportation Needs   In the past 12 months, has lack of transportation kept you from medical appointments or from getting medications? no       DC Planning:  Went in and met with the pt, confirmed demographics.     Transitional Care Coordination Progress Note:  Patient discussed during interdisciplinary rounds.     Plan per Medical/Surgical team:    Home Care choice for home going needs, [facility].  Pt Needs: PT/OT.  [facility] ordered for RPM.         Discharge disposition: [facility]. [facility], [facility]    Potential Barriers: None    ADOD:  6/16    This TCC will continue to follow for home going needs and safe DC plan.      Alison Lees. SHARRI. TCC.

## 2025-06-11 NOTE — PROGRESS NOTES
TRANSPLANT SURGERY PROGRESS NOTE    Luis Panda underwent transplant surgery on 6/10/2025 (Kidney) and was evaluated on multidisciplinary inpatient rounds.  I specifically evaluated the management of immunosuppression to ensure adequate exposure required to decrease the risk of rejection.  Furthermore, I reviewed potential side effects including tremor, hyperglycemia, leukopenia, infection, and other neurologic changes.  I reviewed and adjusted infectious prophylaxis based on the patients clinical condition and  protocols.     24 EVENTS: immediate graft function    DIAGNOSIS: Kidney replaced by transplant ([facility]) Z94.0    PHYSICAL EXAMINATION:  Last Recorded Vitals  Visit Vitals  /75 (BP Location: Right arm, Patient Position: Lying)   Pulse 67   Temp 36.6 °C (97.9 °F)   Resp 16      Intake/Output last 3 Shifts:    Intake/Output Summary (Last 24 hours) at 6/11/2025 1018  Last data filed at 6/11/2025 1000  Gross per 24 hour   Intake 4272.4 ml   Output 2451 ml   Net 1821.4 ml      Vitals:    06/11/25 0600   Weight: 70.6 kg (155 lb 10.3 oz)      Gen: A+OX3; NAD  HEENT: PERRL, sclera anicteric, MMM  Cardiac: RRR  Chest: Normal inspiratory effort  Abdomen: S/NT/ND. Incision C/D/I.  Ext: No LE edema  Drain: serosanguinous    LABS:  CBC and Auto Differential   Result Value Ref Range    WBC 5.4 4.4 - 11.3 x10*3/uL    nRBC 0.0 0.0 - 0.0 /100 WBCs    RBC 2.50 (L) 4.00 - 5.20 x10*6/uL    Hemoglobin 7.8 (L) 12.0 - 16.0 g/dL    Hematocrit 24.5 (L) 36.0 - 46.0 %    MCV 98 80 - 100 fL    MCH 31.2 26.0 - 34.0 pg    MCHC 31.8 (L) 32.0 - 36.0 g/dL    RDW 14.7 (H) 11.5 - 14.5 %    Platelets 67 (L) 150 - 450 x10*3/uL     Renal Function Panel   Result Value Ref Range    Glucose 170 (H) 74 - 99 mg/dL    Sodium 140 136 - 145 mmol/L    Potassium 4.3 3.5 - 5.3 mmol/L    Chloride 110 (H) 98 - 107 mmol/L    Bicarbonate 20 (L) 21 - 32 mmol/L    Anion Gap 14 10 - 20 mmol/L    Urea Nitrogen 53 (H) 6 - 23 mg/dL    Creatinine 7.60  (H) 0.50 - 1.05 mg/dL    eGFR 6 (L) >60 mL/min/1.73m*2    Calcium 8.7 8.6 - 10.6 mg/dL    Phosphorus 4.5 2.5 - 4.9 mg/dL    Albumin 3.5 3.4 - 5.0 g/dL     ASSESSMENT AND PLAN:    Ms. Panda is a 60 y.o. female who underwent  transplant surgery on 6/10/2025 (Kidney).    DDKT 6/10/25  DCD 73% KDPI - CIT 22 hrs  PRA 0     Kidney allograft function              Creatinine downtrending today              Convert to Simulect induction given thrombocytopenia   Stop IVF today and start renal diet   Keep close to dry weight and abdominal binder given poor quality fascia   Lasix 60 mg IV once today   Replete lytes per protocol     Immunosuppression              Thymo 1.5 mg/kg one dose  Simulect dose#1 today   Hold Tacrolimus               MMF reduce to 500 mg bid (thrombocytopenia)  Solumedrol 500 mg x 3 (1st dose was intra-op), then 250 mg, 125 mg, Pred 20 mg  Quiroz 3 days; remove Friday 6/13    Thrombocytopenia  Stop Thymo, reduce MMF, higher steroids  Hold Promacta periop given thrombosis risk  Transfuse platelets and 1U PRBC today  Not candidate for bactrim, plan atovaquone  No heparin subcutaneous; SCDs/PRAVEEN stockings    Hypertension   Home coreg 25 mg bid; currently holding hydralazine    Wound/drain   Analgesia: schedule tylenol and robaxin   Lidocaine patches   Oxycodone prn    Anemia   Transfuse 1U PRBC, no signs bleeding   Aranesp 100 mcg today   Repeat CBC post-transfusion     Prophylaxis              Clotrimazole, protonix, atovaquone  Hold CMV prophylaxis for now    Dispo   [facility] discharge Monday 6/16   PT/OT today and OOB to chair    at bedside and son joined MDT by phone     Case was presented at Multi Disciplinary team rounds   Attending physician, consulting physician, , pharmacist, residents and fellow were present at the meeting.     Debbie Gallo MD, PHD, MPH, FACS  Chief, Transplant and Hepatobiliary Surgery

## 2025-06-11 NOTE — PROGRESS NOTES
Physical Therapy    Physical Therapy Evaluation    Patient Name: Luis Panda  MRN: [id number]  Department: [place] [id number]  Room: [address] [place] [address]  Today's Date: 6/11/2025   Time Calculation  Start Time: 1137  Stop Time: 1154  Time Calculation (min): 17 min    Assessment/Plan   PT Assessment  PT Assessment Results: Impaired balance, Decreased endurance, Decreased mobility  Rehab Prognosis: Good  Barriers to Discharge Home: No anticipated barriers  Evaluation/Treatment Tolerance: Patient tolerated treatment well  Medical Staff Made Aware: Yes  End of Session Communication: Bedside nurse  Assessment Comment: Patient is a 61yo F s/p DDKT. Patient is indep at baseline and has supportive family. Patient CGA with FWW for transfers and ambulation. dc rec low intensity  End of Session Patient Position: Bed, 3 rail up, Alarm off, not on at start of session  IP OR SWING BED PT PLAN  Inpatient or Swing Bed: Inpatient  PT Plan  Treatment/Interventions: Bed mobility, Gait training, Transfer training, Stair training, Balance training, Neuromuscular re-education, Endurance training, Strengthening, Range of motion, Therapeutic exercise, Therapeutic activity  PT Plan: Ongoing PT  PT Frequency: 3 times per week  PT Discharge Recommendations: Low intensity level of continued care  Equipment Recommended upon Discharge: Wheeled walker  PT Recommended Transfer Status: Assistive device, Contact guard  PT - OK to Discharge: Yes    Subjective     PT Visit Info:  PT Received On: 06/11/25  General Visit Information:  General  Reason for Referral: s/p DDKT  Past Medical History Relevant to Rehab: 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  Prior to Session Communication: Bedside nurse  Patient Position Received: Bed, 3 rail up  General Comment: pt supine in bed, RN cleared. cooperative.  Home Living:  Home Living  Type of Home: House  Lives With: Significant other  (grandrenay)  Home Adaptive Equipment: None  Home Layout: Bed/bath upstairs (bathroom on first floor too)  Home Access: Stairs to enter without rails  Entrance Stairs-Number of Steps: 2  Bathroom Shower/Tub: Tub/shower unit  Prior Level of Function:  Prior Function Per Pt/Caregiver Report  Level of [place]: Independent with ADLs and functional transfers, Independent with homemaking with ambulation  Receives Help From: Family  ADL Assistance: Independent  Homemaking Assistance: Independent  Ambulatory Assistance: Independent  Prior Function Comments: - drive, - falls  Precautions:  Precautions  Medical Precautions: Fall precautions  Post-Surgical Precautions: Abdominal surgery precautions          Objective   Pain:  Pain Assessment  Pain Assessment: 0-10  0-10 (Numeric) Pain Score:  (4-6/10)  Pain Type: Surgical pain, Acute pain  Pain Location: Abdomen  Pain Interventions: Repositioned (RN medicated during session)  Cognition:  Cognition  Overall Cognitive Status: Within Functional Limits  Orientation Level: Oriented X4    General Assessments:     Activity Tolerance  Endurance: Tolerates 10 - 20 min exercise with multiple rests    Sensation  Light Touch:  (denies)       Static Sitting Balance  Static Sitting-Level of Assistance: Close supervision  Static Sitting-Comment/Number of Minutes: 5 min  Functional Assessments:  Bed Mobility  Bed Mobility: Yes  Bed Mobility 1  Bed Mobility 1: Supine to sitting, Sitting to supine, Log roll  Level of Assistance 1: Minimal verbal cues, Minimum assistance    Transfers  Transfer: Yes  Transfer 1  Transfer From 1: Sit to, Stand to  Transfer to 1: Stand, Sit  Technique 1: Sit to stand, Stand to sit  Transfer Device 1: Walker  Transfer Level of Assistance 1: Contact guard  Trials/Comments 1: stood from EOB    Ambulation/Gait Training  Ambulation/Gait Training Performed: Yes  Ambulation/Gait Training 1  Surface 1: Level tile  Device 1: Rolling walker  Assistance 1: Contact  guard  Quality of Gait 1: Decreased step length  Comments/Distance (ft) 1: lateral steps along EOB, 3'  Extremity/Trunk Assessments:  RLE   RLE : Within Functional Limits  LLE   LLE : Within Functional Limits  Outcome Measures:  [facility] Basic Mobility  Turning from your back to your side while in a flat bed without using bedrails: A little  Moving from lying on your back to sitting on the side of a flat bed without using bedrails: A little  Moving to and from bed to chair (including a wheelchair): A little  Standing up from a chair using your arms (e.g. wheelchair or bedside chair): A little  To walk in hospital room: A little  Climbing 3-5 steps with railing: A little  Basic Mobility - Total Score: 18    Encounter Problems       Encounter Problems (Active)       Mobility       STG - Patient will ambulate > 200' with LRAD modif indep  (Progressing)       Start:  06/11/25    Expected End:  06/25/25            STG - Patient will ascend and descend 2 steps with LRAD modif indep (Progressing)       Start:  06/11/25    Expected End:  06/25/25               PT Transfers       STG - Patient will perform bed mobility modif indep log roll  (Progressing)       Start:  06/11/25    Expected End:  06/25/25            STG - Patient will transfer sit to and from stand LRAD modif indep  (Progressing)       Start:  06/11/25    Expected End:  06/25/25               Pain - Adult              Education Documentation  Precautions, taught by Bailey Rossi PT at 6/11/2025 12:58 PM.  Learner: Patient  Readiness: Acceptance  Method: Explanation  Response: Verbalizes Understanding  Comment: edu on role of PT, dc plan and safety    Mobility Training, taught by Bailey Rossi PT at 6/11/2025 12:58 PM.  Learner: Patient  Readiness: Acceptance  Method: Explanation  Response: Verbalizes Understanding  Comment: edu on role of PT, dc plan and safety    Education Comments  No comments found.

## 2025-06-11 NOTE — CARE PLAN
Problem: Pain - Adult  Goal: Verbalizes/displays adequate comfort level or baseline comfort level  Outcome: Progressing     Problem: Safety - Adult  Goal: Free from fall injury  Outcome: Progressing     Problem: Discharge Planning  Goal: Discharge to home or other facility with appropriate resources  Outcome: Progressing     Problem: Chronic Conditions and Co-morbidities  Goal: Patient's chronic conditions and co-morbidity symptoms are monitored and maintained or improved  Outcome: Progressing     Problem: Nutrition  Goal: Nutrient intake appropriate for maintaining nutritional needs  Outcome: Progressing     Problem: Nutrition  Goal: Nutrient intake appropriate for maintaining nutritional needs  Outcome: Progressing     Problem: Pain  Goal: Takes deep breaths with improved pain control throughout the shift  Outcome: Progressing  Goal: Turns in bed with improved pain control throughout the shift  Outcome: Progressing  Goal: Walks with improved pain control throughout the shift  Outcome: Progressing  Goal: Performs ADL's with improved pain control throughout shift  Outcome: Progressing  Goal: Participates in PT with improved pain control throughout the shift  Outcome: Progressing  Goal: Free from opioid side effects throughout the shift  Outcome: Progressing  Goal: Free from acute confusion related to pain meds throughout the shift  Outcome: Progressing   The patient's goals for the shift include      The clinical goals for the shift include pt will have adequate urine output

## 2025-06-11 NOTE — PROGRESS NOTES
Pharmacist Post-Transplant Note    The Clinical Transplant Pharmacist is aware that Luis Panda has been admitted and has undergone kidney transplantation. A transplant pharmacist will be rounding with the multidisciplinary transplant team and making recommendations on her medication regimen.     The patient's medications have been reviewed in conjunction with the multidisciplinary transplant team. The pharmacist is in agreement with the plan of care for medications at this time.    Patient and donor factors were screened to determine the appropriate post-transplant protocol and current immunosuppression plan includes:    Induction Regimen:  Basiliximab (also received 1 dose of Thymo 1.5 mg/kg but will switch to basiliximab due to thrombocytopenia)    Maintenance Regimen:  Tacrolimus, Mycophenolate mofetil, and Methylprednisolone/Prednisone Taper    Maintenance immunosuppression and anti-infective prophylaxis will begin according to protocol. Home medications will begin when the patient is clinically stable. Of note, CMV D+/R+ so patient will require 3 months of CMV prophylaxis with valganciclovir per protocol.    Misty Mejia, PharmD, BCTXP   Solid Organ Transplant Clinical Pharmacy Specialist

## 2025-06-12 ENCOUNTER — APPOINTMENT (OUTPATIENT)
Dept: RADIOLOGY | Facility: HOSPITAL | Age: 60
End: 2025-06-12
Payer: MEDICAID

## 2025-06-12 LAB
ALBUMIN SERPL BCP-MCNC: 3.4 G/DL (ref 3.4–5)
ANION GAP SERPL CALC-SCNC: 16 MMOL/L (ref 10–20)
BASOPHILS # BLD AUTO: 0.01 X10*3/UL (ref 0–0.1)
BASOPHILS NFR BLD AUTO: 0.1 %
BLOOD EXPIRATION DATE: NORMAL
BLOOD EXPIRATION DATE: NORMAL
BUN SERPL-MCNC: 66 MG/DL (ref 6–23)
CA-I BLD-SCNC: 1.15 MMOL/L (ref 1.1–1.33)
CALCIUM SERPL-MCNC: 8.5 MG/DL (ref 8.6–10.6)
CHLORIDE SERPL-SCNC: 106 MMOL/L (ref 98–107)
CO2 SERPL-SCNC: 20 MMOL/L (ref 21–32)
CREAT SERPL-MCNC: 5.99 MG/DL (ref 0.5–1.05)
DISPENSE STATUS: NORMAL
DISPENSE STATUS: NORMAL
EGFRCR SERPLBLD CKD-EPI 2021: 7 ML/MIN/1.73M*2
EOSINOPHIL # BLD AUTO: 0 X10*3/UL (ref 0–0.7)
EOSINOPHIL NFR BLD AUTO: 0 %
ERYTHROCYTE [DISTWIDTH] IN BLOOD BY AUTOMATED COUNT: 16.9 % (ref 11.5–14.5)
EST. AVERAGE GLUCOSE BLD GHB EST-MCNC: 114 MG/DL
FLOW ALLOCROSSMATCH: NORMAL
GLUCOSE BLD MANUAL STRIP-MCNC: 183 MG/DL (ref 74–99)
GLUCOSE BLD MANUAL STRIP-MCNC: 184 MG/DL (ref 74–99)
GLUCOSE BLD MANUAL STRIP-MCNC: 186 MG/DL (ref 74–99)
GLUCOSE BLD MANUAL STRIP-MCNC: 220 MG/DL (ref 74–99)
GLUCOSE SERPL-MCNC: 183 MG/DL (ref 74–99)
HBA1C MFR BLD: 5.6 % (ref ?–5.7)
HCT VFR BLD AUTO: 25.1 % (ref 36–46)
HGB BLD-MCNC: 8.6 G/DL (ref 12–16)
HLA RESULTS: NORMAL
IMM GRANULOCYTES # BLD AUTO: 0.09 X10*3/UL (ref 0–0.7)
IMM GRANULOCYTES NFR BLD AUTO: 0.7 % (ref 0–0.9)
LYMPHOCYTES # BLD AUTO: 0.17 X10*3/UL (ref 1.2–4.8)
LYMPHOCYTES NFR BLD AUTO: 1.4 %
MAGNESIUM SERPL-MCNC: 2.46 MG/DL (ref 1.6–2.4)
MCH RBC QN AUTO: 31.4 PG (ref 26–34)
MCHC RBC AUTO-ENTMCNC: 34.3 G/DL (ref 32–36)
MCV RBC AUTO: 92 FL (ref 80–100)
MONOCYTES # BLD AUTO: 0.4 X10*3/UL (ref 0.1–1)
MONOCYTES NFR BLD AUTO: 3.3 %
NEUTROPHILS # BLD AUTO: 11.62 X10*3/UL (ref 1.2–7.7)
NEUTROPHILS NFR BLD AUTO: 94.5 %
NRBC BLD-RTO: 0 /100 WBCS (ref 0–0)
PHOSPHATE SERPL-MCNC: 5.7 MG/DL (ref 2.5–4.9)
PLATELET # BLD AUTO: 109 X10*3/UL (ref 150–450)
POTASSIUM SERPL-SCNC: 3.8 MMOL/L (ref 3.5–5.3)
PRODUCT BLOOD TYPE: 7300
PRODUCT BLOOD TYPE: 7300
PRODUCT CODE: NORMAL
PRODUCT CODE: NORMAL
RBC # BLD AUTO: 2.74 X10*6/UL (ref 4–5.2)
SODIUM SERPL-SCNC: 138 MMOL/L (ref 136–145)
UNIT ABO: NORMAL
UNIT ABO: NORMAL
UNIT NUMBER: NORMAL
UNIT NUMBER: NORMAL
UNIT RH: NORMAL
UNIT RH: NORMAL
UNIT VOLUME: 287
UNIT VOLUME: 350
WBC # BLD AUTO: 12.3 X10*3/UL (ref 4.4–11.3)
XM INTEP: NORMAL

## 2025-06-12 PROCEDURE — 2500000001 HC RX 250 WO HCPCS SELF ADMINISTERED DRUGS (ALT 637 FOR MEDICARE OP): Mod: SE

## 2025-06-12 PROCEDURE — 99233 SBSQ HOSP IP/OBS HIGH 50: CPT | Performed by: INTERNAL MEDICINE

## 2025-06-12 PROCEDURE — 2500000004 HC RX 250 GENERAL PHARMACY W/ HCPCS (ALT 636 FOR OP/ED): Mod: SE

## 2025-06-12 PROCEDURE — 93971 EXTREMITY STUDY: CPT | Performed by: STUDENT IN AN ORGANIZED HEALTH CARE EDUCATION/TRAINING PROGRAM

## 2025-06-12 PROCEDURE — 85025 COMPLETE CBC W/AUTO DIFF WBC: CPT

## 2025-06-12 PROCEDURE — 99232 SBSQ HOSP IP/OBS MODERATE 35: CPT | Performed by: SURGERY

## 2025-06-12 PROCEDURE — 83735 ASSAY OF MAGNESIUM: CPT

## 2025-06-12 PROCEDURE — 93971 EXTREMITY STUDY: CPT

## 2025-06-12 PROCEDURE — 82947 ASSAY GLUCOSE BLOOD QUANT: CPT

## 2025-06-12 PROCEDURE — 2500000002 HC RX 250 W HCPCS SELF ADMINISTERED DRUGS (ALT 637 FOR MEDICARE OP, ALT 636 FOR OP/ED): Mod: SE

## 2025-06-12 PROCEDURE — 1200000002 HC GENERAL ROOM WITH TELEMETRY DAILY

## 2025-06-12 PROCEDURE — RXMED WILLOW AMBULATORY MEDICATION CHARGE

## 2025-06-12 PROCEDURE — 82330 ASSAY OF CALCIUM: CPT

## 2025-06-12 PROCEDURE — 80069 RENAL FUNCTION PANEL: CPT

## 2025-06-12 PROCEDURE — 83036 HEMOGLOBIN GLYCOSYLATED A1C: CPT

## 2025-06-12 PROCEDURE — 36415 COLL VENOUS BLD VENIPUNCTURE: CPT

## 2025-06-12 RX ORDER — POLYETHYLENE GLYCOL 3350 17 G/17G
17 POWDER, FOR SOLUTION ORAL DAILY
Status: DISCONTINUED | OUTPATIENT
Start: 2025-06-12 | End: 2025-06-16 | Stop reason: HOSPADM

## 2025-06-12 RX ORDER — TACROLIMUS 1 MG/1
1 CAPSULE ORAL
Status: DISCONTINUED | OUTPATIENT
Start: 2025-06-12 | End: 2025-06-16

## 2025-06-12 RX ORDER — TACROLIMUS 1 MG/1
3 CAPSULE ORAL 2 TIMES DAILY
Qty: 180 CAPSULE | Refills: 0 | Status: SHIPPED | OUTPATIENT
Start: 2025-06-12 | End: 2025-06-13 | Stop reason: HOSPADM

## 2025-06-12 RX ORDER — AMOXICILLIN 250 MG
1 CAPSULE ORAL 2 TIMES DAILY
Status: DISCONTINUED | OUTPATIENT
Start: 2025-06-12 | End: 2025-06-16 | Stop reason: HOSPADM

## 2025-06-12 RX ORDER — DOCUSATE SODIUM 100 MG/1
100 CAPSULE, LIQUID FILLED ORAL 2 TIMES DAILY PRN
Qty: 10 CAPSULE | Refills: 0 | Status: SHIPPED | OUTPATIENT
Start: 2025-06-12 | End: 2025-06-22

## 2025-06-12 RX ORDER — ACETAMINOPHEN 325 MG/1
650 TABLET ORAL EVERY 6 HOURS PRN
Qty: 30 TABLET | Refills: 0 | Status: SHIPPED | OUTPATIENT
Start: 2025-06-12 | End: 2025-06-22

## 2025-06-12 RX ORDER — CLOTRIMAZOLE 10 MG/1
10 LOZENGE ORAL
Qty: 90 TROCHE | Refills: 0 | Status: SHIPPED | OUTPATIENT
Start: 2025-06-12 | End: 2025-06-20 | Stop reason: SDUPTHER

## 2025-06-12 RX ORDER — MYCOPHENOLATE MOFETIL 250 MG/1
1000 CAPSULE ORAL 2 TIMES DAILY
Qty: 240 CAPSULE | Refills: 0 | Status: SHIPPED | OUTPATIENT
Start: 2025-06-12 | End: 2025-06-20 | Stop reason: SDUPTHER

## 2025-06-12 RX ORDER — PREDNISONE 5 MG/1
20 TABLET ORAL DAILY
Qty: 120 TABLET | Refills: 0 | Status: SHIPPED | OUTPATIENT
Start: 2025-06-15 | End: 2025-06-20 | Stop reason: SDUPTHER

## 2025-06-12 RX ORDER — VALGANCICLOVIR 450 MG/1
900 TABLET, FILM COATED ORAL DAILY
Qty: 60 TABLET | Refills: 0 | Status: SHIPPED | OUTPATIENT
Start: 2025-06-12 | End: 2025-06-13 | Stop reason: HOSPADM

## 2025-06-12 RX ORDER — PANTOPRAZOLE SODIUM 40 MG/1
40 TABLET, DELAYED RELEASE ORAL
Qty: 30 TABLET | Refills: 0 | Status: SHIPPED | OUTPATIENT
Start: 2025-06-12 | End: 2025-07-16

## 2025-06-12 RX ORDER — POLYETHYLENE GLYCOL 3350 17 G/17G
17 POWDER, FOR SOLUTION ORAL DAILY
Qty: 238 G | Refills: 0 | Status: SHIPPED | OUTPATIENT
Start: 2025-06-12

## 2025-06-12 RX ORDER — TACROLIMUS 0.5 MG/1
0.5 CAPSULE ORAL 2 TIMES DAILY
Qty: 60 CAPSULE | Refills: 0 | Status: SHIPPED | OUTPATIENT
Start: 2025-06-12 | End: 2025-06-20 | Stop reason: SDUPTHER

## 2025-06-12 RX ORDER — FUROSEMIDE 10 MG/ML
80 INJECTION INTRAMUSCULAR; INTRAVENOUS ONCE
Status: COMPLETED | OUTPATIENT
Start: 2025-06-12 | End: 2025-06-12

## 2025-06-12 RX ORDER — ATOVAQUONE 750 MG/5ML
1500 SUSPENSION ORAL DAILY
Qty: 300 ML | Refills: 0 | Status: SHIPPED | OUTPATIENT
Start: 2025-06-12 | End: 2025-06-20 | Stop reason: SDUPTHER

## 2025-06-12 RX ADMIN — ACETAMINOPHEN 650 MG: 325 TABLET ORAL at 06:58

## 2025-06-12 RX ADMIN — CLOTRIMAZOLE 10 MG: 10 LOZENGE ORAL at 16:45

## 2025-06-12 RX ADMIN — CARVEDILOL 25 MG: 25 TABLET, FILM COATED ORAL at 20:34

## 2025-06-12 RX ADMIN — POLYETHYLENE GLYCOL 3350 17 G: 17 POWDER, FOR SOLUTION ORAL at 09:57

## 2025-06-12 RX ADMIN — SENNOSIDES AND DOCUSATE SODIUM 1 TABLET: 8.6; 5 TABLET ORAL at 20:35

## 2025-06-12 RX ADMIN — CLOTRIMAZOLE 10 MG: 10 LOZENGE ORAL at 09:57

## 2025-06-12 RX ADMIN — SENNOSIDES AND DOCUSATE SODIUM 1 TABLET: 8.6; 5 TABLET ORAL at 09:57

## 2025-06-12 RX ADMIN — INSULIN LISPRO 2 UNITS: 100 INJECTION, SOLUTION INTRAVENOUS; SUBCUTANEOUS at 16:47

## 2025-06-12 RX ADMIN — CARVEDILOL 25 MG: 25 TABLET, FILM COATED ORAL at 09:57

## 2025-06-12 RX ADMIN — FUROSEMIDE 80 MG: 10 INJECTION, SOLUTION INTRAMUSCULAR; INTRAVENOUS at 16:45

## 2025-06-12 RX ADMIN — PANTOPRAZOLE SODIUM 40 MG: 40 TABLET, DELAYED RELEASE ORAL at 06:58

## 2025-06-12 RX ADMIN — ACETAMINOPHEN 650 MG: 325 TABLET ORAL at 00:23

## 2025-06-12 RX ADMIN — TACROLIMUS 1 MG: 1 CAPSULE ORAL at 09:57

## 2025-06-12 RX ADMIN — MYCOPHENOLATE MOFETIL 500 MG: 250 CAPSULE ORAL at 06:58

## 2025-06-12 RX ADMIN — METHYLPREDNISOLONE SODIUM SUCCINATE 500 MG: 500 INJECTION INTRAMUSCULAR; INTRAVENOUS at 09:57

## 2025-06-12 RX ADMIN — MYCOPHENOLATE MOFETIL 500 MG: 250 CAPSULE ORAL at 18:13

## 2025-06-12 RX ADMIN — INSULIN LISPRO 2 UNITS: 100 INJECTION, SOLUTION INTRAVENOUS; SUBCUTANEOUS at 10:02

## 2025-06-12 RX ADMIN — ATOVAQUONE 1500 MG: 750 SUSPENSION ORAL at 22:51

## 2025-06-12 RX ADMIN — PANTOPRAZOLE SODIUM 40 MG: 40 TABLET, DELAYED RELEASE ORAL at 16:45

## 2025-06-12 RX ADMIN — TACROLIMUS 1 MG: 1 CAPSULE ORAL at 18:13

## 2025-06-12 RX ADMIN — CLOTRIMAZOLE 10 MG: 10 LOZENGE ORAL at 12:20

## 2025-06-12 RX ADMIN — SIMVASTATIN 20 MG: 20 TABLET, FILM COATED ORAL at 09:57

## 2025-06-12 RX ADMIN — ACETAMINOPHEN 650 MG: 325 TABLET ORAL at 12:20

## 2025-06-12 ASSESSMENT — PAIN DESCRIPTION - LOCATION: LOCATION: ABDOMEN

## 2025-06-12 ASSESSMENT — PAIN - FUNCTIONAL ASSESSMENT: PAIN_FUNCTIONAL_ASSESSMENT: 0-10

## 2025-06-12 ASSESSMENT — PAIN SCALES - GENERAL
PAINLEVEL_OUTOF10: 4
PAINLEVEL_OUTOF10: 7
PAINLEVEL_OUTOF10: 3

## 2025-06-12 NOTE — HOSPITAL COURSE
Pt is a 61 y/o female with a hx of ESRD secondary to PCKD whom received a  donor kidney transplant on 6/10/25 by Dr. Melvin with a KDPI of 73% and PRA of 19%. Donor was Hepc -/-and has not met risk factors. EBV +/+. Left donor kidney transplanted to patient right side. Dry weight is 66kg (discharge weight is *** ).  Pt received a total of 1.5 mg/kg total of thymoglobulin induction which caused significant thrombocytopenia. Simulect given in conjunction with 500mg IV solumedrol.  Pt was initiated on Tacrolimus & Cellcept immunosuppression in conjunction with extended IV solumedrol taper.  Pt was started on acyclovir and letermovir (CMV D + / R + ) and atovaquone for CMV & PCP prophylaxis per protocol. he was started on clotrimazole as antifungal coverage per protocol. Operative course was uncomplicated.  Hospital course was uncomplicated. Quiroz catheter was removed on POD #3 and the patient is voiding spontaneously.The patient had DGF?  Pt required/did not require dialysis and electrolytes remain stable. Dialysis access via LUE but never required dialysis pre-transplant. BP & glucose under good control. Other medical issues. Did the patient have a biopsy____(DSA?). Was the patient taken back to the OR?    Pt is tolerating a regular diet, maintaining adequate hydration with oral intake, ambulating independently and having BMs. Immunosuppression was managed by the transplant team. Patient is in stable condition for discharge home with renal telehealth today and homecare for drain. RX delivered to bedside prior to discharge. The patient will f/u in the transplant institute and have labs drawn in the walk-in clinic.

## 2025-06-12 NOTE — CONSULTS
Inpatient consult to Endocrinology  Consult performed by: Laura Baca MD  Consult ordered by: Melissa Baker, APRN-CNP  Reason for consult: DDKT, DM2 with hyperglycemia in setting of glucocorticoid      History Of Present Illness  Luis Panda is a 60 y.o. 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  Patient is currently receiving glucocorticoid for immunosuppression resulting in hyperglycemia.  Endocrinology service is consulted for evaluation of hyperglycemia in setting of prediabetes, current use of steroids and stressful state.    Diabetes History  Type of diabetes: prediabetes since atleast 2021  Last A1c: 5.9 (2/2025)  Follows with: PCP  Home regimen: Glipizide 5 mg daily, decreased dose to 2.5 mg (2/2025) due to lower BG readings on 5 mg dose, no hx of insulin use  Hypoglycemia:  reports fasting BG of 80-90s on glipizide  LDL: 142(1/2024), on simvastatin 20 mg daily    Past Medical History  She has a past medical history of CKD (chronic kidney disease), Diabetes mellitus (Multi), HLD (hyperlipidemia), Hypertension, and PONV (postoperative nausea and vomiting).    Surgical History  She has a past surgical history that includes MR angio head wo IV contrast (08/07/2023) and AV fistula placement (Left).     Social History  She reports that she quit smoking about 40 years ago. Her smoking use included cigarettes. She has never been exposed to tobacco smoke. She has never used smokeless tobacco. She reports that she does not currently use alcohol. She reports that she does not use drugs.    Family History  Family History[1]     Allergies  Amlodipine and Oxycodone  Last Recorded Vitals  Blood pressure 169/80, pulse 64, temperature 36.1 °C (97 °F), resp. rate 15, height 1.524 m (5'), weight 71.3 kg (157 lb 3.2 oz), SpO2 93%.     Physical Exam  General: patient in NAD  HEENT: AT/NC  Resp: CTA B/L, on room air  CVS: normal s1 and s2  Abdomen: post  op   Skin: warm, dry and intact  Neuro: AAO x3  Psych: cooperative     ROS, PMH, FH/SH, surgical history and allergies have been reviewed.   Relevant Results  Results from last 7 days   Lab Units 06/12/25  0730 06/12/25  0516 06/11/25  1944 06/11/25  1655 06/11/25  1209 06/11/25  0753 06/11/25  0527 06/11/25  0014 06/10/25  1715 06/10/25  1646 06/10/25  0926   POCT GLUCOSE mg/dL 184*  --  181* 181* 179* 160*  --    < >  --    < >  --    GLUCOSE mg/dL  --  183*  --   --   --   --  174*  --  170*  --  117*    < > = values in this interval not displayed.     Results from last 7 days   Lab Units 06/12/25  0516 06/10/25  1715 06/10/25  0926   HEMOGLOBIN A1C % 5.6  --   --    SODIUM mmol/L 138   < > 140   POTASSIUM mmol/L 3.8   < > 4.0   CHLORIDE mmol/L 106   < > 109*   CO2 mmol/L 20*   < > 16*   BUN mg/dL 66*   < > 58*   CREATININE mg/dL 5.99*   < > 8.40*   CALCIUM mg/dL 8.5*   < > 9.4   ALBUMIN g/dL 3.4   < > 4.1   PROTEIN TOTAL g/dL  --   --  7.7   BILIRUBIN TOTAL mg/dL  --   --  0.6   ALK PHOS U/L  --   --  76   ALT U/L  --   --  13   AST U/L  --   --  22   GLUCOSE mg/dL 183*   < > 117*    < > = values in this interval not displayed.     Assessment/Plan   Assessment & Plan  Kidney transplant candidate    PONV (postoperative nausea and vomiting)    ESRD (end stage renal disease) (Multi)    Luis Panda is a 60 y.o. 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  Patient is currently receiving glucocorticoid for immunosuppression resulting in hyperglycemia.  Endocrinology service is consulted for evaluation of hyperglycemia.    Diabetes History  Type of diabetes: prediabetes since atleast 2021  Last A1c: 5.9 (2/2025)  Follows with: PCP  Home regimen: Glipizide 5 mg daily, decreased dose to 2.5 mg (2/2025) due to lower BG readings on 5 mg dose, no hx of insulin use  Hypoglycemia:  reports fasting BG of 80-90s on glipizide  LDL: 142(1/2024), on simvastatin  20 mg daily    Steroids:   IV Solu-Medrol 500 mg daily since 6/10- 6/12)  IV Solu-Medrol 250 mg (6/13)  IV Solu-Medrol 125 mg (6/14)  Tab Prednisone 20 mg (starting 6/15)    Nutrition: 60g CHO per meal    #Hyperglycemia in setting of prediabetes, current use of steroids and stressful state.  Insulin lispro sliding scale #2 with meals                     Adjust to q4h if NPO or if persistently hyperglycemic  Accuchecks (not BMP) TIDAC and at bedtime                     Adjust to q4h if NPO or if persistently hyperglycemic  75 gm carb consistent diet  Consult diabetes educator, Johanna Ernst in anticipation of patient discharged with insulin upon discharge  Goal -180  Hypoglycemia bundle  Will continue to follow and titrate insulin accordingly  Please let us know incase you are changing the patient's diet or glucocorticoids dose as that will alter the regimen above     Discharge planning:   [x] patient may expect to discharge home on MDI, final plan TBD by titration  [x]Will need ordered glucometer supplies for patient  [x]Will enroll pt in  pharm PP and with transplant PA team 2-4 weeks from discharge  [x]follow up with PCP in 1-2 wks      Recommendations communicated to primary team. Please reach out incase you have any questions or concerns.    The patient was seen and discussed with attending Dr. Rosy Baca MD  Endocrinology fellow          [1] No family history on file.

## 2025-06-12 NOTE — PROGRESS NOTES
Luis Panda is a 60 y.o. female on day 2 of admission presenting with Kidney transplant candidate.      Transitional Care Coordination Progress Note:  Patient discussed during interdisciplinary rounds.      Plan per Medical/Surgical team:    Home Care choice for home going needs, [facility].  Pt Needs: PT/OT.  [facility] ordered for RPM.          Discharge disposition: [facility], [facility]     Potential Barriers: None     ADOD:  6/16     This TCC will continue to follow for home going needs and safe DC plan.      THONY OWENS

## 2025-06-12 NOTE — SIGNIFICANT EVENT
06/12/25 1138   Patient Interaction   Organ Kidney   Interdisciplinary Rounds   Attendance Surgeon;Physician;ROCK;Pharmacist     Transplant Surgery Multidisciplinary Team Note    Luis Panda is a 60 y.o. female   POD#2 from a Kidney from a DCD donor. Her post operative complications: None    24 Hour Events  1. MOLLY    Last Recorded Vitals  Visit Vitals  /80 (BP Location: Right arm, Patient Position: Lying)   Pulse 64   Temp 36.1 °C (97 °F)   Resp 15      Intake/Output last 3 Shifts:    Intake/Output Summary (Last 24 hours) at 6/12/2025 1138  Last data filed at 6/12/2025 0915  Gross per 24 hour   Intake 980 ml   Output 2095 ml   Net -1115 ml      Vitals:    06/12/25 0956   Weight: 71.3 kg (157 lb 3.2 oz)        Assessment/Plan   Kidney allograft function  -slow function, good urine output    Immunosuppression/ppx  -significant drop in plt w/thymo dose- thymo 1.5mg/kg complete  -simulect 6/11  -tac,mmf, extended steroid taper  -80 IV lasix    Anemia of chronic disease  -1 prbc/1plt yesterday  -aranesp 6/11        Principal Problem:    Management after organ transplant  Active Problems:  Problem List[1]     Immunosuppression reviewed and adjusted       Induction: Simulect, Steroid, and Thymoglobulin Lowered Dose       Tacrolimus goal 8-10 ng/mL. Current dose 1 mg BID         MMF 1000 mg po BID       Solumedrol taper  DVT prophylaxis SCDS  PT/OT  Diet: general  Anticipated discharge 4-5 days    Melissa Baker, APRN-CNP             [1]   Patient Active Problem List  Diagnosis    Age-related nuclear cataract of both eyes    Anemia    Arteriovenous fistula    Cerebral aneurysm, nonruptured (HHS-HCC)    CKD (chronic kidney disease)    Diabetes mellitus with chronic kidney disease (Multi)    Essential hypertension    Gastroesophageal reflux disease without esophagitis    Insomnia    Left arm swelling    Memory loss    Mixed hyperlipidemia    Obesity, Class I, BMI 30-34.9    Platelets decreased    Polycystic  kidney, unspecified    Presbyopia    Right flank pain    Type 2 diabetes mellitus without complication, without long-term current use of insulin    Vision impairment    Kidney transplant candidate    ESRD (end stage renal disease) (Multi)    PONV (postoperative nausea and vomiting)

## 2025-06-12 NOTE — CARE PLAN
Problem: Pain - Adult  Goal: Verbalizes/displays adequate comfort level or baseline comfort level  Outcome: Progressing     Problem: Discharge Planning  Goal: Discharge to home or other facility with appropriate resources  Outcome: Progressing     Problem: Chronic Conditions and Co-morbidities  Goal: Patient's chronic conditions and co-morbidity symptoms are monitored and maintained or improved  Outcome: Progressing     Problem: Nutrition  Goal: Nutrient intake appropriate for maintaining nutritional needs  Outcome: Progressing     Problem: Pain  Goal: Takes deep breaths with improved pain control throughout the shift  Outcome: Progressing  Goal: Turns in bed with improved pain control throughout the shift  Outcome: Progressing  Goal: Walks with improved pain control throughout the shift  Outcome: Progressing  Goal: Performs ADL's with improved pain control throughout shift  Outcome: Progressing  Goal: Participates in PT with improved pain control throughout the shift  Outcome: Progressing     Problem: Fall/Injury  Goal: Verbalize understanding of personal risk factors for fall in the hospital  Outcome: Progressing  Goal: Verbalize understanding of risk factor reduction measures to prevent injury from fall in the home  Outcome: Progressing  Goal: Use assistive devices by end of the shift  Outcome: Progressing  Goal: Pace activities to prevent fatigue by end of the shift  Outcome: Progressing

## 2025-06-12 NOTE — PROGRESS NOTES
TRANSPLANT SURGERY PROGRESS NOTE    Luis Panda underwent transplant surgery on 6/10/2025 (Kidney) and was evaluated on multidisciplinary inpatient rounds.  I specifically evaluated the management of immunosuppression to ensure adequate exposure required to decrease the risk of rejection.  Furthermore, I reviewed potential side effects including tremor, hyperglycemia, leukopenia, infection, and other neurologic changes.  I reviewed and adjusted infectious prophylaxis based on the patients clinical condition and  protocols.     24 EVENTS: no acute events    DIAGNOSIS: Kidney replaced by transplant ([facility]) Z94.0    PHYSICAL EXAMINATION:  Last Recorded Vitals  Visit Vitals  /72 (BP Location: Right arm, Patient Position: Lying)   Pulse 64   Temp 36.4 °C (97.5 °F) (Temporal)   Resp 15      Intake/Output last 3 Shifts:    Intake/Output Summary (Last 24 hours) at 6/12/2025 0712  Last data filed at 6/12/2025 0415  Gross per 24 hour   Intake 790 ml   Output 1995 ml   Net -1205 ml      Vitals:    06/12/25 0500   Weight: 57.4 kg (126 lb 8.7 oz)      Gen: A+OX3; NAD  HEENT: PERRL, sclera anicteric, MMM  Cardiac: RRR  Chest: Normal inspiratory effort  Abdomen: S/NT/ND. Incision C/D/I.  Ext: 1+ LE edema, RUE slightly more swollen  Drain: serosanguinous    LABS:  CBC   Result Value Ref Range    WBC 9.5 4.4 - 11.3 x10*3/uL    nRBC 0.0 0.0 - 0.0 /100 WBCs    RBC 2.41 (L) 4.00 - 5.20 x10*6/uL    Hemoglobin 7.5 (L) 12.0 - 16.0 g/dL    Hematocrit 22.1 (L) 36.0 - 46.0 %    MCV 92 80 - 100 fL    MCH 31.1 26.0 - 34.0 pg    MCHC 33.9 32.0 - 36.0 g/dL    RDW 15.7 (H) 11.5 - 14.5 %    Platelets 108 (L) 150 - 450 x10*3/uL     Renal Function Panel   Result Value Ref Range    Glucose 183 (H) 74 - 99 mg/dL    Sodium 138 136 - 145 mmol/L    Potassium 3.8 3.5 - 5.3 mmol/L    Chloride 106 98 - 107 mmol/L    Bicarbonate 20 (L) 21 - 32 mmol/L    Anion Gap 16 10 - 20 mmol/L    Urea Nitrogen 66 (H) 6 - 23 mg/dL    Creatinine 5.99 (H)  0.50 - 1.05 mg/dL    eGFR 7 (L) >60 mL/min/1.73m*2    Calcium 8.5 (L) 8.6 - 10.6 mg/dL    Phosphorus 5.7 (H) 2.5 - 4.9 mg/dL    Albumin 3.4 3.4 - 5.0 g/dL     ASSESSMENT AND PLAN:    Ms. Panda is a 60 y.o. female who underwent  transplant surgery on 6/10/2025 (Kidney).    DDKT 6/10/25  DCD 73% KDPI - CIT 22 hrs  PRA 0     Kidney allograft function              Creatinine downtrending today              Convert to Simulect induction given thrombocytopenia              Keep close to dry weight and abdominal binder given poor quality fascia              Lasix 80 mg IV once today; RUE duplex for swelling              Replete lytes per protocol     Immunosuppression              Thymo 1.5 mg/kg one dose  Simulect dose#2 6/14   Start Tacrolimus 1 mg bid               mg bid (thrombocytopenia)  Solumedrol 500 mg x 3 (1st dose was intra-op), then 250 mg, 125 mg, Pred 20 mg  Quiroz 3 days; remove Friday 6/13     Thrombocytopenia  Stop Thymo, reduce MMF, higher steroids  Hold Promacta periop given thrombosis risk  Not candidate for bactrim, plan atovaquone  No heparin subcutaneous; SCDs/PRAVEEN stockings     Hypertension              Home coreg 25 mg bid; currently holding hydralazine     Wound/drain              Analgesia: schedule tylenol and robaxin              Lidocaine patches              Oxycodone prn     Prophylaxis              Clotrimazole, protonix, atovaquone  Hold CMV prophylaxis for now     Dispo              Target discharge Monday 6/16              PT/OT today and OOB to chair               and son at bedside     Case was presented at Multi Disciplinary team rounds   Attending physician, consulting physician, , pharmacist, residents and fellow were present at the meeting.     Debbie Gallo MD, PHD, MPH, FACS  Chief, Transplant and Hepatobiliary Surgery

## 2025-06-12 NOTE — CARE PLAN
The clinical goals for the shift include Pt will have adequate UOP      Problem: Pain - Adult  Goal: Verbalizes/displays adequate comfort level or baseline comfort level  Outcome: Progressing     Problem: Discharge Planning  Goal: Discharge to home or other facility with appropriate resources  Outcome: Progressing     Problem: Chronic Conditions and Co-morbidities  Goal: Patient's chronic conditions and co-morbidity symptoms are monitored and maintained or improved  Outcome: Progressing     Problem: Nutrition  Goal: Nutrient intake appropriate for maintaining nutritional needs  Outcome: Progressing     Problem: Pain  Goal: Takes deep breaths with improved pain control throughout the shift  Outcome: Progressing  Goal: Turns in bed with improved pain control throughout the shift  Outcome: Progressing  Goal: Walks with improved pain control throughout the shift  Outcome: Progressing  Goal: Performs ADL's with improved pain control throughout shift  Outcome: Progressing  Goal: Participates in PT with improved pain control throughout the shift  Outcome: Progressing     Problem: Fall/Injury  Goal: Verbalize understanding of personal risk factors for fall in the hospital  Outcome: Progressing  Goal: Verbalize understanding of risk factor reduction measures to prevent injury from fall in the home  Outcome: Progressing  Goal: Use assistive devices by end of the shift  Outcome: Progressing  Goal: Pace activities to prevent fatigue by end of the shift  Outcome: Progressing

## 2025-06-12 NOTE — CARE PLAN
Problem: Pain - Adult  Goal: Verbalizes/displays adequate comfort level or baseline comfort level  6/12/2025 1125 by Coco Justice RN  Outcome: Progressing  6/12/2025 1124 by Coco Justice RN  Outcome: Progressing     Problem: Discharge Planning  Goal: Discharge to home or other facility with appropriate resources  6/12/2025 1125 by Coco Justice RN  Outcome: Progressing  6/12/2025 1124 by Coco Justice RN  Outcome: Progressing     Problem: Chronic Conditions and Co-morbidities  Goal: Patient's chronic conditions and co-morbidity symptoms are monitored and maintained or improved  6/12/2025 1125 by Coco Justice RN  Outcome: Progressing  6/12/2025 1124 by Coco Justice RN  Outcome: Progressing     Problem: Nutrition  Goal: Nutrient intake appropriate for maintaining nutritional needs  6/12/2025 1125 by Coco Justice RN  Outcome: Progressing  6/12/2025 1124 by Coco Justice RN  Outcome: Progressing     Problem: Pain  Goal: Takes deep breaths with improved pain control throughout the shift  6/12/2025 1125 by Coco Justice RN  Outcome: Progressing  6/12/2025 1124 by Coco Justice RN  Outcome: Progressing  Goal: Turns in bed with improved pain control throughout the shift  6/12/2025 1125 by Coco Justice RN  Outcome: Progressing  6/12/2025 1124 by Coco Justice RN  Outcome: Progressing  Goal: Walks with improved pain control throughout the shift  6/12/2025 1125 by Coco Justice RN  Outcome: Progressing  6/12/2025 1124 by Coco Justice RN  Outcome: Progressing  Goal: Performs ADL's with improved pain control throughout shift  6/12/2025 1125 by Coco Justice RN  Outcome: Progressing  6/12/2025 1124 by Coco Justice RN  Outcome: Progressing  Goal: Participates in PT with improved pain control throughout the shift  6/12/2025 1125 by Coco Justice RN  Outcome: Progressing  6/12/2025 1124 by Coco Justice RN  Outcome: Progressing     Problem: Fall/Injury  Goal: Verbalize understanding of  personal risk factors for fall in the hospital  6/12/2025 1125 by Coco Justice RN  Outcome: Progressing  6/12/2025 1124 by Coco Justice RN  Outcome: Progressing  Goal: Verbalize understanding of risk factor reduction measures to prevent injury from fall in the home  6/12/2025 1125 by Coco Justice RN  Outcome: Progressing  6/12/2025 1124 by Coco Justice RN  Outcome: Progressing  Goal: Use assistive devices by end of the shift  6/12/2025 1125 by Coco Justice RN  Outcome: Progressing  6/12/2025 1124 by Coco Justice RN  Outcome: Progressing  Goal: Pace activities to prevent fatigue by end of the shift  6/12/2025 1125 by Coco Justice RN  Outcome: Progressing  6/12/2025 1124 by Coco Justice RN  Outcome: Progressing

## 2025-06-13 LAB
ALBUMIN SERPL BCP-MCNC: 3.2 G/DL (ref 3.4–5)
ANION GAP SERPL CALC-SCNC: 16 MMOL/L (ref 10–20)
BASOPHILS # BLD AUTO: 0.01 X10*3/UL (ref 0–0.1)
BASOPHILS NFR BLD AUTO: 0.1 %
BLOOD EXPIRATION DATE: NORMAL
BLOOD EXPIRATION DATE: NORMAL
BUN SERPL-MCNC: 80 MG/DL (ref 6–23)
CA-I BLD-SCNC: 1.11 MMOL/L (ref 1.1–1.33)
CALCIUM SERPL-MCNC: 9.1 MG/DL (ref 8.6–10.6)
CHLORIDE SERPL-SCNC: 104 MMOL/L (ref 98–107)
CO2 SERPL-SCNC: 20 MMOL/L (ref 21–32)
CREAT SERPL-MCNC: 5.07 MG/DL (ref 0.5–1.05)
DISPENSE STATUS: NORMAL
DISPENSE STATUS: NORMAL
EGFRCR SERPLBLD CKD-EPI 2021: 9 ML/MIN/1.73M*2
EOSINOPHIL # BLD AUTO: 0 X10*3/UL (ref 0–0.7)
EOSINOPHIL NFR BLD AUTO: 0 %
ERYTHROCYTE [DISTWIDTH] IN BLOOD BY AUTOMATED COUNT: 15.8 % (ref 11.5–14.5)
GLUCOSE BLD MANUAL STRIP-MCNC: 170 MG/DL (ref 74–99)
GLUCOSE BLD MANUAL STRIP-MCNC: 189 MG/DL (ref 74–99)
GLUCOSE BLD MANUAL STRIP-MCNC: 217 MG/DL (ref 74–99)
GLUCOSE BLD MANUAL STRIP-MCNC: 268 MG/DL (ref 74–99)
GLUCOSE SERPL-MCNC: 192 MG/DL (ref 74–99)
HCT VFR BLD AUTO: 26.3 % (ref 36–46)
HGB BLD-MCNC: 8.9 G/DL (ref 12–16)
IMM GRANULOCYTES # BLD AUTO: 0.09 X10*3/UL (ref 0–0.7)
IMM GRANULOCYTES NFR BLD AUTO: 0.7 % (ref 0–0.9)
LYMPHOCYTES # BLD AUTO: 0.24 X10*3/UL (ref 1.2–4.8)
LYMPHOCYTES NFR BLD AUTO: 1.9 %
MAGNESIUM SERPL-MCNC: 2.39 MG/DL (ref 1.6–2.4)
MCH RBC QN AUTO: 31.7 PG (ref 26–34)
MCHC RBC AUTO-ENTMCNC: 33.8 G/DL (ref 32–36)
MCV RBC AUTO: 94 FL (ref 80–100)
MONOCYTES # BLD AUTO: 0.51 X10*3/UL (ref 0.1–1)
MONOCYTES NFR BLD AUTO: 4.1 %
NEUTROPHILS # BLD AUTO: 11.53 X10*3/UL (ref 1.2–7.7)
NEUTROPHILS NFR BLD AUTO: 93.2 %
NRBC BLD-RTO: 0 /100 WBCS (ref 0–0)
PHOSPHATE SERPL-MCNC: 5.3 MG/DL (ref 2.5–4.9)
PLATELET # BLD AUTO: 90 X10*3/UL (ref 150–450)
POTASSIUM SERPL-SCNC: 3.5 MMOL/L (ref 3.5–5.3)
PRODUCT BLOOD TYPE: 7300
PRODUCT BLOOD TYPE: 7300
PRODUCT CODE: NORMAL
PRODUCT CODE: NORMAL
RBC # BLD AUTO: 2.81 X10*6/UL (ref 4–5.2)
SODIUM SERPL-SCNC: 136 MMOL/L (ref 136–145)
TACROLIMUS BLD-MCNC: 6.4 NG/ML
UNIT ABO: NORMAL
UNIT ABO: NORMAL
UNIT NUMBER: NORMAL
UNIT NUMBER: NORMAL
UNIT RH: NORMAL
UNIT RH: NORMAL
UNIT VOLUME: 272
UNIT VOLUME: 350
WBC # BLD AUTO: 12.4 X10*3/UL (ref 4.4–11.3)
XM INTEP: NORMAL
XM INTEP: NORMAL

## 2025-06-13 PROCEDURE — 82330 ASSAY OF CALCIUM: CPT

## 2025-06-13 PROCEDURE — 99232 SBSQ HOSP IP/OBS MODERATE 35: CPT | Performed by: SURGERY

## 2025-06-13 PROCEDURE — 82947 ASSAY GLUCOSE BLOOD QUANT: CPT

## 2025-06-13 PROCEDURE — 2500000001 HC RX 250 WO HCPCS SELF ADMINISTERED DRUGS (ALT 637 FOR MEDICARE OP): Mod: SE

## 2025-06-13 PROCEDURE — 36415 COLL VENOUS BLD VENIPUNCTURE: CPT

## 2025-06-13 PROCEDURE — 2500000005 HC RX 250 GENERAL PHARMACY W/O HCPCS: Mod: SE

## 2025-06-13 PROCEDURE — 83735 ASSAY OF MAGNESIUM: CPT

## 2025-06-13 PROCEDURE — 85025 COMPLETE CBC W/AUTO DIFF WBC: CPT

## 2025-06-13 PROCEDURE — 2500000004 HC RX 250 GENERAL PHARMACY W/ HCPCS (ALT 636 FOR OP/ED): Mod: SE

## 2025-06-13 PROCEDURE — 2500000002 HC RX 250 W HCPCS SELF ADMINISTERED DRUGS (ALT 637 FOR MEDICARE OP, ALT 636 FOR OP/ED): Mod: SE

## 2025-06-13 PROCEDURE — RXMED WILLOW AMBULATORY MEDICATION CHARGE

## 2025-06-13 PROCEDURE — 99232 SBSQ HOSP IP/OBS MODERATE 35: CPT | Performed by: STUDENT IN AN ORGANIZED HEALTH CARE EDUCATION/TRAINING PROGRAM

## 2025-06-13 PROCEDURE — 1100000001 HC PRIVATE ROOM DAILY

## 2025-06-13 PROCEDURE — 80069 RENAL FUNCTION PANEL: CPT

## 2025-06-13 PROCEDURE — 99233 SBSQ HOSP IP/OBS HIGH 50: CPT | Performed by: INTERNAL MEDICINE

## 2025-06-13 PROCEDURE — 80197 ASSAY OF TACROLIMUS: CPT

## 2025-06-13 RX ORDER — POTASSIUM CHLORIDE 20 MEQ/1
40 TABLET, EXTENDED RELEASE ORAL ONCE
Status: COMPLETED | OUTPATIENT
Start: 2025-06-13 | End: 2025-06-13

## 2025-06-13 RX ORDER — METHOCARBAMOL 500 MG/1
500 TABLET, FILM COATED ORAL EVERY 6 HOURS SCHEDULED
Status: DISCONTINUED | OUTPATIENT
Start: 2025-06-13 | End: 2025-06-16 | Stop reason: HOSPADM

## 2025-06-13 RX ORDER — ACYCLOVIR 400 MG/1
400 TABLET ORAL 2 TIMES DAILY
Status: DISCONTINUED | OUTPATIENT
Start: 2025-06-13 | End: 2025-06-16 | Stop reason: HOSPADM

## 2025-06-13 RX ORDER — TRAMADOL HYDROCHLORIDE 50 MG/1
50 TABLET, FILM COATED ORAL EVERY 6 HOURS PRN
Status: DISCONTINUED | OUTPATIENT
Start: 2025-06-13 | End: 2025-06-16 | Stop reason: HOSPADM

## 2025-06-13 RX ORDER — LIDOCAINE 560 MG/1
1 PATCH PERCUTANEOUS; TOPICAL; TRANSDERMAL EVERY 24 HOURS
Status: DISCONTINUED | OUTPATIENT
Start: 2025-06-13 | End: 2025-06-16 | Stop reason: HOSPADM

## 2025-06-13 RX ORDER — ACETAMINOPHEN 325 MG/1
650 TABLET ORAL EVERY 6 HOURS
Status: DISCONTINUED | OUTPATIENT
Start: 2025-06-13 | End: 2025-06-16 | Stop reason: HOSPADM

## 2025-06-13 RX ORDER — ACYCLOVIR 400 MG/1
400 TABLET ORAL 2 TIMES DAILY
Qty: 60 TABLET | Refills: 0 | Status: SHIPPED | OUTPATIENT
Start: 2025-06-13 | End: 2025-06-20 | Stop reason: SDUPTHER

## 2025-06-13 RX ORDER — DEXTROSE 50 % IN WATER (D50W) INTRAVENOUS SYRINGE
25
Status: DISCONTINUED | OUTPATIENT
Start: 2025-06-13 | End: 2025-06-16

## 2025-06-13 RX ORDER — TRAMADOL HYDROCHLORIDE 50 MG/1
25 TABLET, FILM COATED ORAL EVERY 6 HOURS PRN
Status: DISCONTINUED | OUTPATIENT
Start: 2025-06-13 | End: 2025-06-16 | Stop reason: HOSPADM

## 2025-06-13 RX ORDER — DEXTROSE 50 % IN WATER (D50W) INTRAVENOUS SYRINGE
12.5
Status: DISCONTINUED | OUTPATIENT
Start: 2025-06-13 | End: 2025-06-16

## 2025-06-13 RX ADMIN — DEXTROSE 250 MG: 50 INJECTION, SOLUTION INTRAVENOUS at 08:34

## 2025-06-13 RX ADMIN — CLOTRIMAZOLE 10 MG: 10 LOZENGE ORAL at 17:43

## 2025-06-13 RX ADMIN — CLOTRIMAZOLE 10 MG: 10 LOZENGE ORAL at 11:49

## 2025-06-13 RX ADMIN — METHOCARBAMOL 500 MG: 500 TABLET ORAL at 17:43

## 2025-06-13 RX ADMIN — ATOVAQUONE 1500 MG: 750 SUSPENSION ORAL at 20:57

## 2025-06-13 RX ADMIN — TACROLIMUS 1 MG: 1 CAPSULE ORAL at 06:26

## 2025-06-13 RX ADMIN — TACROLIMUS 1 MG: 1 CAPSULE ORAL at 18:03

## 2025-06-13 RX ADMIN — MYCOPHENOLATE MOFETIL 500 MG: 250 CAPSULE ORAL at 18:03

## 2025-06-13 RX ADMIN — INSULIN LISPRO 6 UNITS: 100 INJECTION, SOLUTION INTRAVENOUS; SUBCUTANEOUS at 11:49

## 2025-06-13 RX ADMIN — ACYCLOVIR 400 MG: 400 TABLET ORAL at 20:57

## 2025-06-13 RX ADMIN — SIMVASTATIN 20 MG: 20 TABLET, FILM COATED ORAL at 08:35

## 2025-06-13 RX ADMIN — MYCOPHENOLATE MOFETIL 500 MG: 250 CAPSULE ORAL at 06:26

## 2025-06-13 RX ADMIN — PANTOPRAZOLE SODIUM 40 MG: 40 TABLET, DELAYED RELEASE ORAL at 06:26

## 2025-06-13 RX ADMIN — CARVEDILOL 25 MG: 25 TABLET, FILM COATED ORAL at 20:57

## 2025-06-13 RX ADMIN — POLYETHYLENE GLYCOL 3350 17 G: 17 POWDER, FOR SOLUTION ORAL at 08:35

## 2025-06-13 RX ADMIN — CLOTRIMAZOLE 10 MG: 10 LOZENGE ORAL at 08:35

## 2025-06-13 RX ADMIN — SENNOSIDES AND DOCUSATE SODIUM 1 TABLET: 8.6; 5 TABLET ORAL at 08:35

## 2025-06-13 RX ADMIN — ACYCLOVIR 400 MG: 400 TABLET ORAL at 15:48

## 2025-06-13 RX ADMIN — INSULIN LISPRO 4 UNITS: 100 INJECTION, SOLUTION INTRAVENOUS; SUBCUTANEOUS at 17:42

## 2025-06-13 RX ADMIN — METHOCARBAMOL 500 MG: 500 TABLET ORAL at 11:49

## 2025-06-13 RX ADMIN — LETERMOVIR 480 MG: 480 TABLET, FILM COATED ORAL at 12:29

## 2025-06-13 RX ADMIN — POTASSIUM CHLORIDE 40 MEQ: 1500 TABLET, EXTENDED RELEASE ORAL at 08:35

## 2025-06-13 RX ADMIN — ACETAMINOPHEN 650 MG: 325 TABLET, FILM COATED ORAL at 17:43

## 2025-06-13 RX ADMIN — SENNOSIDES AND DOCUSATE SODIUM 1 TABLET: 8.6; 5 TABLET ORAL at 20:57

## 2025-06-13 RX ADMIN — CARVEDILOL 25 MG: 25 TABLET, FILM COATED ORAL at 08:35

## 2025-06-13 RX ADMIN — INSULIN LISPRO 1 UNITS: 100 INJECTION, SOLUTION INTRAVENOUS; SUBCUTANEOUS at 08:35

## 2025-06-13 RX ADMIN — PANTOPRAZOLE SODIUM 40 MG: 40 TABLET, DELAYED RELEASE ORAL at 15:48

## 2025-06-13 RX ADMIN — ACETAMINOPHEN 650 MG: 325 TABLET, FILM COATED ORAL at 11:49

## 2025-06-13 RX ADMIN — LIDOCAINE 4% 1 PATCH: 40 PATCH TOPICAL at 11:49

## 2025-06-13 ASSESSMENT — COGNITIVE AND FUNCTIONAL STATUS - GENERAL
MOVING TO AND FROM BED TO CHAIR: A LITTLE
STANDING UP FROM CHAIR USING ARMS: A LITTLE
WALKING IN HOSPITAL ROOM: A LITTLE
CLIMB 3 TO 5 STEPS WITH RAILING: A LOT
DAILY ACTIVITIY SCORE: 24
DAILY ACTIVITIY SCORE: 24
MOBILITY SCORE: 19
MOBILITY SCORE: 24

## 2025-06-13 ASSESSMENT — PAIN SCALES - GENERAL
PAINLEVEL_OUTOF10: 5 - MODERATE PAIN
PAINLEVEL_OUTOF10: 4

## 2025-06-13 ASSESSMENT — PAIN - FUNCTIONAL ASSESSMENT: PAIN_FUNCTIONAL_ASSESSMENT: 0-10

## 2025-06-13 NOTE — CONSULTS
Inpatient Diabetes Education Consult    Reason for Visit:  Luis Panda is a 60 y.o. female who presents for s/p DDKT with hx pre-DM    Consulting Service/Provider: Transplant team    Visit Type: Initial visit    Visit Modality: In-person    Discharge Equipment/Supply Needs: insulin pen and pen needles       Patient has supplies at home: Blood glucose meter:  , Testing strips:  , and Lancets    Patient History and Assessment:  New diagnosis: Steroid-induced hyperglycemia  Previous diagnosis: Pre-diabetes for 4 years  Patient known to Diabetes Education department: No  Treatment prior to hospital admission: Oral medications Glipizide  Diet: Carb restricted  Blood glucose testing: Daily  Complications: ESRD s/p DDKT  PTA Medications:    Current Outpatient Medications   Medication Instructions    acetaminophen (TYLENOL) 650 mg, oral, Every 6 hours PRN    aspirin 81 mg EC tablet Take 1 tablet (81 mg) by mouth once daily.    atovaquone (MEPRON) 1,500 mg, oral, Daily    calcitriol (ROCALTROL) 0.25 mcg, oral, Every Mon/Wed/Fri, 3 times weekyl    carvedilol (COREG) 25 mg, oral, 2 times daily (morning and late afternoon)    cholecalciferol (VITAMIN D-3) 50 mcg, oral, 2 times daily    clotrimazole (MYCELEX) 10 mg, Mouth/Throat, 3 times daily (morning, midday, late afternoon)    docusate sodium (COLACE) 100 mg, oral, 2 times daily PRN    ferrous sulfate, 325 mg ferrous sulfate, tablet 1 tablet, oral, 3 times daily    glipiZIDE 2.5 mg tablet 1 tablet, oral, Daily    hydrALAZINE (APRESOLINE) 50 mg, oral, 3 times daily    mycophenolate (CELLCEPT) 1,000 mg, oral, 2 times daily    omeprazole (PRILOSEC) 40 mg, oral, Daily RT    ondansetron (ZOFRAN) 4 mg, oral, Every 6 hours PRN    pantoprazole (PROTONIX) 40 mg, oral, Daily before breakfast, Do not crush, chew, or split.    polyethylene glycol (GLYCOLAX, MIRALAX) 17 g, oral, Daily    [START ON 6/15/2025] predniSONE (DELTASONE) 20 mg, oral, Daily    Promacta 12.5 mg, oral, Daily  "before breakfast, Take on an empty stomach, 1 hour before or 2 hours afer a meal.    sevelamer carbonate (RENVELA) 800 mg, oral, 3 times daily (morning, midday, late afternoon)    simvastatin (Zocor) 20 mg tablet 1 tablet, oral, Nightly    sodium bicarbonate 650 mg tablet 2 tablets, oral, 3 times daily    tacrolimus (PROGRAF) 3 mg, oral, 2 times daily    tacrolimus (PROGRAF) 0.5 mg, oral, 2 times daily    traZODone (Desyrel) 50 mg tablet 1 tablet, oral, Nightly    TRUEplus Lancets 33 gauge misc     valGANciclovir (VALCYTE) 900 mg, oral, Daily, Do not crush or chew.       Glucose   Date/Time Value Ref Range Status   06/13/2025 05:27  (H) 74 - 99 mg/dL Final   06/12/2025 05:16  (H) 74 - 99 mg/dL Final   06/11/2025 05:27  (H) 74 - 99 mg/dL Final   06/10/2025 05:15  (H) 74 - 99 mg/dL Final   06/10/2025 09:26  (H) 74 - 99 mg/dL Final   05/12/2025 01:16  (H) 74 - 99 mg/dL Final   12/31/2024 12:48  (H) 74 - 99 mg/dL Final   10/14/2024 11:54  (H) 74 - 99 mg/dL Final   09/09/2024 11:37  (H) 74 - 99 mg/dL Final   08/26/2024 10:41 AM 93 74 - 99 mg/dL Final     No results found for: \"CPEPTIDE\"  Hemoglobin A1C   Date Value Ref Range Status   06/12/2025 5.6 See comment % Final   12/09/2022 5.4 % Final     Comment:          Diagnosis of Diabetes-Adults   Non-Diabetic: < or = 5.6%   Increased risk for developing diabetes: 5.7-6.4%   Diagnostic of diabetes: > or = 6.5%  .       Monitoring of Diabetes                Age (y)     Therapeutic Goal (%)   Adults:          >18           <7.0   Pediatrics:    13-18           <7.5                   7-12           <8.0                   0- 6            7.5-8.5   American Diabetes Association. Diabetes Care 33(S1), Jan 2010.     03/15/2022 5.4 % Final     Comment:          Diagnosis of Diabetes-Adults   Non-Diabetic: < or = 5.6%   Increased risk for developing diabetes: 5.7-6.4%   Diagnostic of diabetes: > or = 6.5%  .       Monitoring of " Diabetes                Age (y)     Therapeutic Goal (%)   Adults:          >18           <7.0   Pediatrics:    13-18           <7.5                   7-12           <8.0                   0- 6            7.5-8.5   American Diabetes Association. Diabetes Care 33(S1), Jan 2010.       POCT Hemoglobin A1C   Date Value Ref Range Status   02/20/2025 5.9 (A) 4.3 - 5.6 % Final     Comment:     Location:[facility], [address], [place], [address]  Point of care (POC) Hemoglobin A1c (HGBA1C) testing is intended to assess  glucose control and provide a management tool for patients known to have  diabetes and their healthcare providers.  Target HGBA1C levels may depend on  specific clinical circumstances.  POC HGBA1C is not intended for use as a  diagnostic or screening test; laboratory-based testing should be used for  diagnostic purposes.  The following information is supplemental and may not  be applicable to specific diabetes management situations:  The POC device   provides a normal range of 4.2% to 6.5% for the HGBA1C POC test.  However, the American Diabetes Association  guidelines indicate that  patients with HGBA1C in the range of 5.7% to 6.4% are at increased risk for  development of diabetes and that intervention by lifestyle modification may  be beneficial.  A HGBA1C level greater than or equal to 6.5% is considered  diagnostic of diabetes, pending confirmatory testing.  Use of HGBA1C testing  to evaluate glucose control may not be appropriate for patients with  hemoglobin variants or other conditions (e.g. anemia) that alter red blood  cell lifespan.   03/28/2022 5.5 4.2 - 5.6 % Final     Comment:     Location:[facility], [address] [place], [address]  Point of care (POC) Hemoglobin A1c (HGBA1C) testing is intended to assess  glucose control and provide a management tool for patients known to have  diabetes and their healthcare providers.  Target HGBA1C levels may depend  on  specific clinical circumstances.  POC HGBA1C is not intended for use as a  diagnostic or screening test; laboratory-based testing should be used for  diagnostic purposes.  The following information is supplemental and may not  be applicable to specific diabetes management situations:  The POC device   provides a normal range of 4.2% to 6.5% for the HGBA1C POC test.  However, the American Diabetes Association  guidelines indicate that  patients with HGBA1C in the range of 5.7% to 6.4% are at increased risk for  development of diabetes and that intervention by lifestyle modification may  be beneficial.  A HGBA1C level greater than or equal to 6.5% is considered  diagnostic of diabetes, pending confirmatory testing.  Use of HGBA1C testing  to evaluate glucose control may not be appropriate for patients with  hemoglobin variants or other conditions (e.g. anemia) that alter red blood  cell lifespan.       Patient Learning/Readiness Assessment:  Ms. Panda and family agreeable to this visit.      Interventions/Topics Covered:  See After Visit Summary for handouts/information sheets provided to patient.  Education Documentation  Healthy Food Options, taught by Johanna Ernst RN at 6/13/2025 11:03 AM.  Learner: Significant Other, Family, Patient  Readiness: Acceptance  Method: Explanation  Response: Verbalizes Understanding  Comment: per son she was managing diet well 2' pre-DM diagnosis    How Foods Affect Blood Sugar, taught by Johanna Ernst RN at 6/13/2025 11:03 AM.  Learner: Significant Other, Family, Patient  Readiness: Acceptance  Method: Explanation  Response: Verbalizes Understanding  Comment: per son she was managing diet well 2' pre-DM diagnosis    Insulin Dose Calculation, taught by Johanna Ernst RN at 6/13/2025 11:03 AM.  Learner: Significant Other, Family, Patient  Readiness: Acceptance  Method: Explanation, Demonstration, Teach-back, Handout  Response: Verbalizes Understanding  Comment: education for  SS insulin plan for home    Insulin Administration, taught by Johanna Ernst RN at 6/13/2025 11:03 AM.  Learner: Significant Other, Family, Patient  Readiness: Acceptance  Method: Explanation, Demonstration, Teach-back, Handout  Response: Verbalizes Understanding  Comment: education for SS insulin plan for home    Insulin Types, taught by Johanna Ernst RN at 6/13/2025 11:03 AM.  Learner: Significant Other, Family, Patient  Readiness: Acceptance  Method: Explanation, Demonstration, Teach-back, Handout  Response: Verbalizes Understanding  Comment: education for SS insulin plan for home    Hypoglycemia, taught by Johanna Ernst RN at 6/13/2025 11:03 AM.  Learner: Significant Other, Family, Patient  Readiness: Acceptance  Method: Handout, Explanation  Response: Needs Reinforcement    Monitoring Blood Glucose, taught by Johanna Ernst RN at 6/13/2025 11:01 AM.  Learner: Significant Other, Family, Patient  Readiness: Acceptance  Method: Explanation  Response: Verbalizes Understanding          Additional topics covered: Ms. Panda speaks Arabic.  She is agreeable that education re insulin be directed to her son.  He will be assisting with insulin administration at home if needed.  Over time he will educate his mom how to do this for herself.    Education re the action of steroids on the BG/pre-DM.  Currently her BG values are mostly < 200 mg/dL.  Education re the action of humalog insulin and how it is dosed with SS.  Chart written out and son able to determine correct dose of insulin based on BG scenario.    We discussed that as the steroid dose decreases, her need for insulin may decrease, too.  She will start oral prednisone on 6/15.   Insulin pen return demo done correctly by son.    Mrs. Panda declines insulin self injection but would prefer her son do this for her at this time.  RN made aware.     Additional materials provided: diabetes handbook    CGM:  n/a at this time    Education Outcome/Recommendations:         Recommendations for bedside nursing: allow son to do the insulin injections    Recommendations for Providers: Follow-up w/ PCP and/or Endocrinology    Additional Comments: Mrs. Panda and family agreeable to follow up visit 6/16 to review insulin plan for home.  Time spent:  35 mins.

## 2025-06-13 NOTE — PROGRESS NOTES
Luis Panda is a 60 y.o. female on day 2 of admission presenting with Kidney transplant candidate.    No acute issues overnight.  Plt stabilized.  Nonoliguric. Cr trending down.    Scheduled medications  atovaquone, 1,500 mg, oral, Daily  [START ON 6/15/2025] basiliximab, 20 mg, intravenous, Once  carvedilol, 25 mg, oral, q12h  clotrimazole, 10 mg, Mouth/Throat, TID  insulin lispro, 0-10 Units, subcutaneous, TID AC  [START ON 6/14/2025] methylPREDNISolone sodium succinate (PF), 125 mg, intravenous, Once  [START ON 6/13/2025] methylPREDNISolone sodium succinate (PF), 250 mg, intravenous, Once  mycophenolate, 500 mg, oral, q12h  pantoprazole, 40 mg, oral, BID AC  polyethylene glycol, 17 g, oral, Daily  [START ON 6/15/2025] predniSONE, 20 mg, oral, Daily  sennosides-docusate sodium, 1 tablet, oral, BID  simvastatin, 20 mg, oral, Daily  tacrolimus, 1 mg, oral, q12h NARGIS      Continuous medications     PRN medications  PRN medications: acetaminophen, dextrose, dextrose, glucagon, glucagon, hydrALAZINE, naloxone, oxyCODONE, oxyCODONE, oxygen      Last Recorded Vitals  Blood pressure 137/66, pulse 63, temperature 36.6 °C (97.9 °F), resp. rate 18, height 1.524 m (5'), weight 71.3 kg (157 lb 3.2 oz), SpO2 94%.  Intake/Output last 3 Shifts:  I/O last 3 completed shifts:  In: 1100 (15.4 mL/kg) [P.O.:980; I.V.:120 (1.7 mL/kg)]  Out: 2340 (32.8 mL/kg) [Urine:2245 (0.9 mL/kg/hr); Drains:95]  Weight: 71.3 kg     A&ox3, no distress, pleasant  MMM, no lesions  Lungs with equal air entry, no added sounds  Rrr, no m/r/g  Abd soft, nt, nd  No allograft tenderness, incision c/d/I, drains in place  No edema b/l    Results for orders placed or performed during the hospital encounter of 06/10/25 (from the past 24 hours)   Magnesium   Result Value Ref Range    Magnesium 2.46 (H) 1.60 - 2.40 mg/dL   Renal Function Panel   Result Value Ref Range    Glucose 183 (H) 74 - 99 mg/dL    Sodium 138 136 - 145 mmol/L    Potassium 3.8 3.5 - 5.3  mmol/L    Chloride 106 98 - 107 mmol/L    Bicarbonate 20 (L) 21 - 32 mmol/L    Anion Gap 16 10 - 20 mmol/L    Urea Nitrogen 66 (H) 6 - 23 mg/dL    Creatinine 5.99 (H) 0.50 - 1.05 mg/dL    eGFR 7 (L) >60 mL/min/1.73m*2    Calcium 8.5 (L) 8.6 - 10.6 mg/dL    Phosphorus 5.7 (H) 2.5 - 4.9 mg/dL    Albumin 3.4 3.4 - 5.0 g/dL   CBC and Auto Differential   Result Value Ref Range    WBC 12.3 (H) 4.4 - 11.3 x10*3/uL    nRBC 0.0 0.0 - 0.0 /100 WBCs    RBC 2.74 (L) 4.00 - 5.20 x10*6/uL    Hemoglobin 8.6 (L) 12.0 - 16.0 g/dL    Hematocrit 25.1 (L) 36.0 - 46.0 %    MCV 92 80 - 100 fL    MCH 31.4 26.0 - 34.0 pg    MCHC 34.3 32.0 - 36.0 g/dL    RDW 16.9 (H) 11.5 - 14.5 %    Platelets 109 (L) 150 - 450 x10*3/uL    Neutrophils % 94.5 40.0 - 80.0 %    Immature Granulocytes %, Automated 0.7 0.0 - 0.9 %    Lymphocytes % 1.4 13.0 - 44.0 %    Monocytes % 3.3 2.0 - 10.0 %    Eosinophils % 0.0 0.0 - 6.0 %    Basophils % 0.1 0.0 - 2.0 %    Neutrophils Absolute 11.62 (H) 1.20 - 7.70 x10*3/uL    Immature Granulocytes Absolute, Automated 0.09 0.00 - 0.70 x10*3/uL    Lymphocytes Absolute 0.17 (L) 1.20 - 4.80 x10*3/uL    Monocytes Absolute 0.40 0.10 - 1.00 x10*3/uL    Eosinophils Absolute 0.00 0.00 - 0.70 x10*3/uL    Basophils Absolute 0.01 0.00 - 0.10 x10*3/uL   Calcium, Ionized   Result Value Ref Range    POCT Calcium, Ionized 1.15 1.1 - 1.33 mmol/L   Hemoglobin A1c   Result Value Ref Range    Hemoglobin A1C 5.6 See comment %    Estimated Average Glucose 114 Not Established mg/dL   POCT GLUCOSE   Result Value Ref Range    POCT Glucose 184 (H) 74 - 99 mg/dL   POCT GLUCOSE   Result Value Ref Range    POCT Glucose 186 (H) 74 - 99 mg/dL   POCT GLUCOSE   Result Value Ref Range    POCT Glucose 183 (H) 74 - 99 mg/dL   POCT GLUCOSE   Result Value Ref Range    POCT Glucose 220 (H) 74 - 99 mg/dL     Renal US: 6/12/25  1. Elevated velocity at the transplant renal venous anastomosis,  which can be secondary to early postoperative edema and/or  venous  tortuosity. Attention on follow-up is recommended.  2. Otherwise unremarkable ultrasound and Doppler evaluation of the  transplant kidney as detailed above    Assessment & Plan    60 y.o. 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 Transplant Nephrology following along with Transplant surgery team.     Patient is Hungarian speaking.     Kidney info: Kidney info: DCD, KDPI 73%, PRA 19%, CIT 22h, HCV -/-, CMV +/+, EBV +/+, HBV -/-, donor toxo neg, XM neg, DSA neg. no positive cultures.     Post op course notable for thrombocytopenia due to whic pt was transitioned off ATG to Simulect on POD #1. Did receive 1.5 mg/g ATG on POD #0.has been uneventful thus far.   Immediate graft function    Allograft function: s/p pre-emptive DDKT 6/10/25  - Cr trending down. Nonoliguric, recorded UOP ~2L.  - no significant hypervolemia on exam. Lasix prn boluses for volume management  - metabolic parameters acceptable  - HTN: Bps acceptable. Titrate meds as indicated  - Anemia: Hb 8.6. s/p PRBC 6/11. Monitor and transfuse as needed. S/p Aranesp 100mcg x1 6/11/25.  - CKD-MBD: Ca, Phos acceptable.check PTH  - maintain adequate hydration. Does meds for CrCl    Immunosuppression:  - s/p ATG 1.5 mg/kg x1 6/10. Transitioned to Simulect on POD #1 due to concern for ATG-related thrombocytopenia. Dose #2 6/14/25  - on slow taper Solumedrol- 500mg x3/250/125 followed by oral pred 20 mg/d  - Tac 1mg q12 started, goal Fk 8-10.  mg q12.  - Ppx: Atovaquone, Valcyte (CMV +/+), clotrimazole troches    Heme:   - WBC stable  - h/o ITP, on eltombopag as outpt. Currently on hold due to concern for per-operative thrombosis  - plt improved today     DM2: endocrinology consulted    Will follow.      Jasiel Lombardi MD

## 2025-06-13 NOTE — PROGRESS NOTES
Pharmacist Transplant Education Note    The medications for Luis Panda were reviewed in conjunction with the multidisciplinary transplant team. The pharmacist is in agreement with the plan of care for medications at this time.     Approximately 40 minutes were spent with this patient and family providing medication education and reviewing new post-transplant medications. The patient's grandson was also present for this education session.    An outpatient dosing schedule was created for all oral medications. This schedule was discussed in detail with the patient's grandson, including drug name, use, dose, and the appropriate timing of self-administration (i.e. with or without meals, with or without other medications). Also discussed side effects, drug interactions, and the importance of adherence. Both verbal and written instructions were given to the patient's nephew outlining the appropriate use of all current oral medications.     The patient's nephew demonstrated an acceptable understanding of her current medication list. All questions were answered to the patient's grandson's satisfaction, and the patient's grandson confirmed understanding.     Follow-up education will take place prior to discharge where a finalized list of discharge medications will be reviewed with the patient. Further educational reinforcement should be provided in the outpatient clinic.    Misty Mejia, PharmD, BCTXP   Solid Organ Transplant Clinical Pharmacy Specialist

## 2025-06-13 NOTE — SIGNIFICANT EVENT
06/12/25 1138   Patient Interaction   Organ Kidney   Interdisciplinary Rounds   Attendance Surgeon;Physician;ROCK;Pharmacist     Transplant Surgery Multidisciplinary Team Note    Luis Panda is a 60 y.o. female   POD#3 from a Kidney from a DCD donor. Her post operative complications: None    24 Hour Events  1. MOLLY    Last Recorded Vitals  Visit Vitals  /75 (BP Location: Right arm, Patient Position: Sitting)   Pulse 62   Temp 36.2 °C (97.2 °F) (Temporal)   Resp 19      Intake/Output last 3 Shifts:    Intake/Output Summary (Last 24 hours) at 6/13/2025 1507  Last data filed at 6/13/2025 1126  Gross per 24 hour   Intake 344 ml   Output 2610 ml   Net -2266 ml      Vitals:    06/13/25 0600   Weight: 64.4 kg (142 lb 1.4 oz)        Assessment/Plan   Kidney allograft function  -slow function, good urine output    Immunosuppression/ppx  -significant drop in plt w/thymo dose- thymo 1.5mg/kg complete,  -simulect 6/11, next dose due 6/15  -tac,mmf, extended steroid taper  -no lasix    Anemia of chronic disease  -1 prbc/1plt yesterday  -aranesp 6/11        Principal Problem:    Management after organ transplant  Active Problems:  Problem List[1]     Immunosuppression reviewed and adjusted       Induction: Simulect, Steroid, and Thymoglobulin Lowered Dose       Tacrolimus goal 8-10 ng/mL. Current dose 1 mg BID         MMF 1000 mg po BID       Solumedrol taper  DVT prophylaxis SCDS  PT/OT  Diet: general  Anticipated discharge 3-4 days    Nuris Yanes, APRN-CNP             [1]   Patient Active Problem List  Diagnosis    Age-related nuclear cataract of both eyes    Anemia    Arteriovenous fistula    Cerebral aneurysm, nonruptured ([facility])    CKD (chronic kidney disease)    Diabetes mellitus with chronic kidney disease (Multi)    Essential hypertension    Gastroesophageal reflux disease without esophagitis    Insomnia    Left arm swelling    Memory loss    Mixed hyperlipidemia    Obesity, Class I, BMI 30-34.9    Platelets  decreased    Polycystic kidney, unspecified    Presbyopia    Right flank pain    Type 2 diabetes mellitus without complication, without long-term current use of insulin    Vision impairment    Kidney transplant candidate    ESRD (end stage renal disease) (Multi)    PONV (postoperative nausea and vomiting)

## 2025-06-13 NOTE — PROGRESS NOTES
Luis Panda is a 60 y.o. female on day 3 of admission presenting with Kidney transplant candidate.    Subjective   Patient seen and examined at bedside in no acute distress.  Patient denies any nausea, vomiting, difficulty breathing, abdominal pain, constipation or diarrhea. Reports stable appetite.  Blood glucose trends reviewed.   I have reviewed histories, allergies and medications have been reviewed and there are no changes       Objective     Last Recorded Vitals  Blood pressure 155/82, pulse 57, temperature 36 °C (96.8 °F), temperature source Temporal, resp. rate 16, height 1.524 m (5'), weight 64.4 kg (142 lb 1.4 oz), SpO2 95%.  Intake/Output last 3 Shifts:  I/O last 3 completed shifts:  In: 980 (15.2 mL/kg) [P.O.:980]  Out: 3755 (58.3 mL/kg) [Urine:3660 (1.6 mL/kg/hr); Drains:95]  Weight: 64.4 kg   Physical Exam  General: patient in NAD  HEENT: AT/NC  Resp: CTA B/L  CVS: normal s1 and s2  Abdomen: post op  Skin: warm, dry and intact  Neuro: AAO x3  Psych: cooperative     ROS, PMH, FH/SH, surgical history and allergies have been reviewed.   Relevant Results  Results from last 7 days   Lab Units 06/13/25  1626 06/13/25  1128 06/13/25  0744 06/13/25  0527 06/12/25  2017 06/12/25  1647 06/12/25  0730 06/12/25  0516 06/11/25  0753 06/11/25  0527 06/11/25  0014 06/10/25  1715 06/10/25  1646 06/10/25  0926   POCT GLUCOSE mg/dL 217* 268* 189*  --  220* 183*   < >  --    < >  --    < >  --    < >  --    GLUCOSE mg/dL  --   --   --  192*  --   --   --  183*  --  174*  --  170*  --  117*    < > = values in this interval not displayed.     Results from last 7 days   Lab Units 06/13/25  0527 06/12/25  0516 06/10/25  1715 06/10/25  0926   HEMOGLOBIN A1C %  --  5.6  --   --    SODIUM mmol/L 136 138   < > 140   POTASSIUM mmol/L 3.5 3.8   < > 4.0   CHLORIDE mmol/L 104 106   < > 109*   CO2 mmol/L 20* 20*   < > 16*   BUN mg/dL 80* 66*   < > 58*   CREATININE mg/dL 5.07* 5.99*   < > 8.40*   CALCIUM mg/dL 9.1 8.5*   < > 9.4    ALBUMIN g/dL 3.2* 3.4   < > 4.1   PROTEIN TOTAL g/dL  --   --   --  7.7   BILIRUBIN TOTAL mg/dL  --   --   --  0.6   ALK PHOS U/L  --   --   --  76   ALT U/L  --   --   --  13   AST U/L  --   --   --  22   GLUCOSE mg/dL 192* 183*   < > 117*    < > = values in this interval not displayed.     Assessment & Plan  Kidney transplant candidate    PONV (postoperative nausea and vomiting)    ESRD (end stage renal disease) (Multi)    Luis Panda is a 60 y.o. 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  Patient is currently receiving glucocorticoid for immunosuppression resulting in hyperglycemia.  Endocrinology service is consulted for evaluation of hyperglycemia.     Diabetes History  Type of diabetes: prediabetes since atleast 2021  Last A1c: 5.9 (2/2025)  Follows with: PCP  Home regimen: Glipizide 5 mg daily, decreased dose to 2.5 mg (2/2025) due to lower BG readings on 5 mg dose, no hx of insulin use  Hypoglycemia:  reports fasting BG of 80-90s on glipizide  LDL: 142(1/2024), on simvastatin 20 mg daily     Steroids:   IV Solu-Medrol 500 mg daily since 6/10- 6/12)  IV Solu-Medrol 250 mg (6/13)  IV Solu-Medrol 125 mg (6/14)  Tab Prednisone 20 mg (starting 6/15)     Nutrition: 75g CHO per meal     #Hyperglycemia in setting of prediabetes, current use of steroids and stressful state.  Start insulin NPH 8 units SQ to be given at time of IV Solu-Medrol administration.  Please link NPH and Solu-Medrol orders to make sure that they are given together.    C/w Insulin lispro sliding scale #2 with meals                     Adjust to q4h if NPO or if persistently hyperglycemic  Accuchecks (not BMP) TIDAC and at bedtime                     Adjust to q4h if NPO or if persistently hyperglycemic  75 gm carb consistent diet  Consult diabetes educator, Johanna Ernst in anticipation of patient discharged with insulin upon discharge  Goal -180  Hypoglycemia  bundle  Will continue to follow and titrate insulin accordingly  Please let us know incase you are changing the patient's diet or glucocorticoids dose as that will alter the regimen above      Discharge planning:   [x] patient may expect to discharge home on MDI, final plan TBD by titration  [x] patient has been seen by diabetes educator, Johanna Ernst. Patient Will need orders for insulin pen, pen needles.  Patient has glucometer testing strips and lancets at home  [x]Will enroll pt in  pharm PP and with transplant PA team for follow-up within 2-4 weeks of discharge  [x]follow up with PCP in 1-2 wks      Recommendations communicated to primary team. Please reach out incase you have any questions or concerns.    The patient was seen and discussed with attending Dr. Rosy Baca MD  Endocrinology fellow

## 2025-06-13 NOTE — CARE PLAN
The patient's goals for the shift include      The clinical goals for the shift include Patient will have adequate nutrition intake throughout this shift      Problem: Pain - Adult  Goal: Verbalizes/displays adequate comfort level or baseline comfort level  Outcome: Progressing     Problem: Discharge Planning  Goal: Discharge to home or other facility with appropriate resources  Outcome: Progressing     Problem: Chronic Conditions and Co-morbidities  Goal: Patient's chronic conditions and co-morbidity symptoms are monitored and maintained or improved  Outcome: Progressing     Problem: Nutrition  Goal: Nutrient intake appropriate for maintaining nutritional needs  Outcome: Progressing     Problem: Pain  Goal: Takes deep breaths with improved pain control throughout the shift  Outcome: Progressing  Goal: Turns in bed with improved pain control throughout the shift  Outcome: Progressing  Goal: Walks with improved pain control throughout the shift  Outcome: Progressing  Goal: Performs ADL's with improved pain control throughout shift  Outcome: Progressing  Goal: Participates in PT with improved pain control throughout the shift  Outcome: Progressing     Problem: Fall/Injury  Goal: Verbalize understanding of personal risk factors for fall in the hospital  Outcome: Progressing  Goal: Verbalize understanding of risk factor reduction measures to prevent injury from fall in the home  Outcome: Progressing  Goal: Use assistive devices by end of the shift  Outcome: Progressing  Goal: Pace activities to prevent fatigue by end of the shift  Outcome: Progressing

## 2025-06-13 NOTE — PROGRESS NOTES
TRANSPLANT SURGERY PROGRESS NOTE    Luis Panda underwent transplant surgery on 6/10/2025 (Kidney) and was evaluated on multidisciplinary inpatient rounds.  I specifically evaluated the management of immunosuppression to ensure adequate exposure required to decrease the risk of rejection.  Furthermore, I reviewed potential side effects including tremor, hyperglycemia, leukopenia, infection, and other neurologic changes.  I reviewed and adjusted infectious prophylaxis based on the patients clinical condition and  protocols.     24 EVENTS: no acute events; family at the bedside    DIAGNOSIS: Kidney replaced by transplant ([facility] Z94.0    PHYSICAL EXAMINATION:  Last Recorded Vitals  Visit Vitals  /75 (BP Location: Right arm, Patient Position: Sitting)   Pulse 62   Temp 36.2 °C (97.2 °F) (Temporal)   Resp 19      Intake/Output last 3 Shifts:    Intake/Output Summary (Last 24 hours) at 6/13/2025 1312  Last data filed at 6/13/2025 1126  Gross per 24 hour   Intake 344 ml   Output 2610 ml   Net -2266 ml      Vitals:    06/13/25 0600   Weight: 64.4 kg (142 lb 1.4 oz)      Gen: A+OX3; NAD  HEENT: PERRL, sclera anicteric, MMM  Cardiac: RRR  Chest: Normal inspiratory effort  Abdomen: S/NT/ND. Incision C/D/I.  Ext: No LE edema  Drain: serosanguinous    LABS:  Renal Function Panel   Result Value Ref Range    Glucose 192 (H) 74 - 99 mg/dL    Sodium 136 136 - 145 mmol/L    Potassium 3.5 3.5 - 5.3 mmol/L    Chloride 104 98 - 107 mmol/L    Bicarbonate 20 (L) 21 - 32 mmol/L    Anion Gap 16 10 - 20 mmol/L    Urea Nitrogen 80 (H) 6 - 23 mg/dL    Creatinine 5.07 (H) 0.50 - 1.05 mg/dL    eGFR 9 (L) >60 mL/min/1.73m*2    Calcium 9.1 8.6 - 10.6 mg/dL    Phosphorus 5.3 (H) 2.5 - 4.9 mg/dL    Albumin 3.2 (L) 3.4 - 5.0 g/dL   CBC and Auto Differential   Result Value Ref Range    WBC 12.4 (H) 4.4 - 11.3 x10*3/uL    nRBC 0.0 0.0 - 0.0 /100 WBCs    RBC 2.81 (L) 4.00 - 5.20 x10*6/uL    Hemoglobin 8.9 (L) 12.0 - 16.0 g/dL     Hematocrit 26.3 (L) 36.0 - 46.0 %    MCV 94 80 - 100 fL    MCH 31.7 26.0 - 34.0 pg    MCHC 33.8 32.0 - 36.0 g/dL    RDW 15.8 (H) 11.5 - 14.5 %    Platelets 90 (L) 150 - 450 x10*3/uL     ASSESSMENT AND PLAN:    Ms. Panda is a 60 y.o. female who underwent  transplant surgery on 6/10/2025 (Kidney).    DDKT 6/10/25  DCD 73% KDPI - CIT 22 hrs  PRA 0     Kidney allograft function              Creatinine downtrending today              Convert to Simulect induction given thrombocytopenia              Keep close to dry weight and abdominal binder given poor quality fascia              No lasix today              Replete lytes per protocol     Immunosuppression              Thymo 1.5 mg/kg one dose  Simulect dose#2 6/15   Start Tacrolimus 1 mg bid               mg bid (thrombocytopenia)  Solumedrol 500 mg x 3 (1st dose was intra-op), then 250 mg, 125 mg, Pred 20 mg  Discontinue Quiroz; check PVRs     Thrombocytopenia  Stopped Thymo, reduced MMF, higher steroids  Hold Promacta periop given thrombosis risk  Not candidate for bactrim, plan atovaquone  No heparin subcutaneous; SCDs/PRAVEEN stockings     Hypertension              Home coreg 25 mg bid; currently holding hydralazine     Wound/drain              Analgesia: schedule tylenol and robaxin              Lidocaine patches              Oxycodone prn     Prophylaxis              Clotrimazole, protonix, atovaquone  CMV prophylaxis; letermovir     Dispo              Target discharge Monday 6/16              PT/OT today and OOB to chair               and son at bedside     Case was presented at Multi Disciplinary team rounds   Attending physician, consulting physician, , pharmacist, residents and fellow were present at the meeting.     Debbie Gallo MD, PHD, MPH, FACS  Chief, Transplant and Hepatobiliary Surgery

## 2025-06-13 NOTE — PROGRESS NOTES
Luis Panda is a 60 y.o. female on day 3 of admission presenting with Kidney transplant candidate.    No acute issues overnight.  Richie removed this AM.   Nonoliguric, recorded UOP 1.9L.   Cr trending down.    Scheduled medications  atovaquone, 1,500 mg, oral, Daily  [START ON 6/15/2025] basiliximab, 20 mg, intravenous, Once  carvedilol, 25 mg, oral, q12h  clotrimazole, 10 mg, Mouth/Throat, TID  insulin lispro, 0-10 Units, subcutaneous, TID AC  [START ON 6/14/2025] methylPREDNISolone sodium succinate (PF), 125 mg, intravenous, Once  methylPREDNISolone sodium succinate (PF), 250 mg, intravenous, Once  mycophenolate, 500 mg, oral, q12h  pantoprazole, 40 mg, oral, BID AC  polyethylene glycol, 17 g, oral, Daily  [START ON 6/15/2025] predniSONE, 20 mg, oral, Daily  sennosides-docusate sodium, 1 tablet, oral, BID  simvastatin, 20 mg, oral, Daily  tacrolimus, 1 mg, oral, q12h NARGIS      Continuous medications     PRN medications  PRN medications: acetaminophen, dextrose, dextrose, glucagon, glucagon, hydrALAZINE, naloxone, oxyCODONE, oxyCODONE, oxygen      Last Recorded Vitals  Blood pressure 152/65, pulse 62, temperature 35.7 °C (96.3 °F), temperature source Temporal, resp. rate 21, height 1.524 m (5'), weight 64.4 kg (142 lb 1.4 oz), SpO2 95%.  Intake/Output last 3 Shifts:  I/O last 3 completed shifts:  In: 980 (15.2 mL/kg) [P.O.:980]  Out: 3755 (58.3 mL/kg) [Urine:3660 (1.6 mL/kg/hr); Drains:95]  Weight: 64.4 kg     A&ox3, no distress, pleasant  MMM, no lesions  Lungs with equal air entry, no added sounds  Rrr, no m/r/g  Abd soft, nt, nd  No allograft tenderness, incision c/d/I, drains in place  No edema b/l    Results for orders placed or performed during the hospital encounter of 06/10/25 (from the past 24 hours)   POCT GLUCOSE   Result Value Ref Range    POCT Glucose 186 (H) 74 - 99 mg/dL   POCT GLUCOSE   Result Value Ref Range    POCT Glucose 183 (H) 74 - 99 mg/dL   POCT GLUCOSE   Result Value Ref Range    POCT  Glucose 220 (H) 74 - 99 mg/dL   Magnesium   Result Value Ref Range    Magnesium 2.39 1.60 - 2.40 mg/dL   Renal Function Panel   Result Value Ref Range    Glucose 192 (H) 74 - 99 mg/dL    Sodium 136 136 - 145 mmol/L    Potassium 3.5 3.5 - 5.3 mmol/L    Chloride 104 98 - 107 mmol/L    Bicarbonate 20 (L) 21 - 32 mmol/L    Anion Gap 16 10 - 20 mmol/L    Urea Nitrogen 80 (H) 6 - 23 mg/dL    Creatinine 5.07 (H) 0.50 - 1.05 mg/dL    eGFR 9 (L) >60 mL/min/1.73m*2    Calcium 9.1 8.6 - 10.6 mg/dL    Phosphorus 5.3 (H) 2.5 - 4.9 mg/dL    Albumin 3.2 (L) 3.4 - 5.0 g/dL   CBC and Auto Differential   Result Value Ref Range    WBC 12.4 (H) 4.4 - 11.3 x10*3/uL    nRBC 0.0 0.0 - 0.0 /100 WBCs    RBC 2.81 (L) 4.00 - 5.20 x10*6/uL    Hemoglobin 8.9 (L) 12.0 - 16.0 g/dL    Hematocrit 26.3 (L) 36.0 - 46.0 %    MCV 94 80 - 100 fL    MCH 31.7 26.0 - 34.0 pg    MCHC 33.8 32.0 - 36.0 g/dL    RDW 15.8 (H) 11.5 - 14.5 %    Platelets 90 (L) 150 - 450 x10*3/uL    Neutrophils % 93.2 40.0 - 80.0 %    Immature Granulocytes %, Automated 0.7 0.0 - 0.9 %    Lymphocytes % 1.9 13.0 - 44.0 %    Monocytes % 4.1 2.0 - 10.0 %    Eosinophils % 0.0 0.0 - 6.0 %    Basophils % 0.1 0.0 - 2.0 %    Neutrophils Absolute 11.53 (H) 1.20 - 7.70 x10*3/uL    Immature Granulocytes Absolute, Automated 0.09 0.00 - 0.70 x10*3/uL    Lymphocytes Absolute 0.24 (L) 1.20 - 4.80 x10*3/uL    Monocytes Absolute 0.51 0.10 - 1.00 x10*3/uL    Eosinophils Absolute 0.00 0.00 - 0.70 x10*3/uL    Basophils Absolute 0.01 0.00 - 0.10 x10*3/uL   Calcium, Ionized   Result Value Ref Range    POCT Calcium, Ionized 1.11 1.1 - 1.33 mmol/L   POCT GLUCOSE   Result Value Ref Range    POCT Glucose 189 (H) 74 - 99 mg/dL     Renal US: 6/12/25  1. Elevated velocity at the transplant renal venous anastomosis,  which can be secondary to early postoperative edema and/or venous  tortuosity. Attention on follow-up is recommended.  2. Otherwise unremarkable ultrasound and Doppler evaluation of the  transplant  kidney as detailed above    Assessment & Plan    60 y.o. 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 Transplant Nephrology following along with Transplant surgery team.     Patient is [place]kin[place] speaking.     Kidney info: Kidney info: DCD, KDPI 73%, PRA 19%, CIT 22h, HCV -/-, CMV +/+, EBV +/+, HBV -/-, donor toxo neg, XM neg, DSA neg. no positive cultures.     Post op course notable for thrombocytopenia due to whic pt was transitioned off ATG to Simulect on POD #1. Did receive 1.5 mg/g ATG on POD #0.has been uneventful thus far.   Immediate graft function    Allograft function: s/p pre-emptive DDKT 6/10/25  - Cr trending down, 5 this AM. Nonoliguric, recorded UOP ~2L.  - no significant hypervolemia on exam. Lasix prn for volume management  - metabolic parameters acceptable  - HTN: Bps acceptable. Titrate meds as indicated  - Anemia: Hb 8.9. s/p PRBC 6/11. Monitor and transfuse as needed. S/p Aranesp 100mcg x1 6/11/25.  - CKD-MBD: Ca, Phos acceptable.check PTH  - maintain adequate hydration. Does meds for CrCl    Immunosuppression:  - s/p ATG 1.5 mg/kg x1 6/10. Transitioned to Simulect on POD #1 due to concern for ATG-related thrombocytopenia. Dose #2 6/14/25  - on slow taper Solumedrol- 500mg x3/250/125 followed by oral pred 20 mg/d  - Tac 1mg q12 started, goal Fk 8-10.  mg q12.  - Ppx: Atovaquone, Valcyte (CMV +/+), clotrimazole troches    Heme:   - WBC stable  - h/o ITP, on eltombopag as outpt. Currently on hold due to concern for jermaine-operative thrombosis  - plt stable 90-100k      DM2: endocrinology consulted    Will follow.      Jasiel Lombardi MD

## 2025-06-14 LAB
ALBUMIN SERPL BCP-MCNC: 3.2 G/DL (ref 3.4–5)
ANION GAP SERPL CALC-SCNC: 18 MMOL/L (ref 10–20)
BASOPHILS # BLD AUTO: 0 X10*3/UL (ref 0–0.1)
BASOPHILS NFR BLD AUTO: 0 %
BUN SERPL-MCNC: 79 MG/DL (ref 6–23)
CA-I BLD-SCNC: 1.16 MMOL/L (ref 1.1–1.33)
CALCIUM SERPL-MCNC: 8.9 MG/DL (ref 8.6–10.6)
CHLORIDE SERPL-SCNC: 103 MMOL/L (ref 98–107)
CO2 SERPL-SCNC: 18 MMOL/L (ref 21–32)
CREAT SERPL-MCNC: 3.95 MG/DL (ref 0.5–1.05)
EGFRCR SERPLBLD CKD-EPI 2021: 12 ML/MIN/1.73M*2
EOSINOPHIL # BLD AUTO: 0 X10*3/UL (ref 0–0.7)
EOSINOPHIL NFR BLD AUTO: 0 %
ERYTHROCYTE [DISTWIDTH] IN BLOOD BY AUTOMATED COUNT: 14.7 % (ref 11.5–14.5)
FERRITIN SERPL-MCNC: 1099 NG/ML (ref 8–150)
GLUCOSE BLD MANUAL STRIP-MCNC: 180 MG/DL (ref 74–99)
GLUCOSE BLD MANUAL STRIP-MCNC: 211 MG/DL (ref 74–99)
GLUCOSE BLD MANUAL STRIP-MCNC: 216 MG/DL (ref 74–99)
GLUCOSE BLD MANUAL STRIP-MCNC: 229 MG/DL (ref 74–99)
GLUCOSE BLD MANUAL STRIP-MCNC: 244 MG/DL (ref 74–99)
GLUCOSE BLD MANUAL STRIP-MCNC: 292 MG/DL (ref 74–99)
GLUCOSE SERPL-MCNC: 205 MG/DL (ref 74–99)
HCT VFR BLD AUTO: 26.5 % (ref 36–46)
HGB BLD-MCNC: 8.9 G/DL (ref 12–16)
IMM GRANULOCYTES # BLD AUTO: 0.06 X10*3/UL (ref 0–0.7)
IMM GRANULOCYTES NFR BLD AUTO: 0.6 % (ref 0–0.9)
IRON SATN MFR SERPL: ABNORMAL %
IRON SERPL-MCNC: 215 UG/DL (ref 35–150)
LYMPHOCYTES # BLD AUTO: 0.27 X10*3/UL (ref 1.2–4.8)
LYMPHOCYTES NFR BLD AUTO: 2.7 %
MAGNESIUM SERPL-MCNC: 2.34 MG/DL (ref 1.6–2.4)
MCH RBC QN AUTO: 31.1 PG (ref 26–34)
MCHC RBC AUTO-ENTMCNC: 33.6 G/DL (ref 32–36)
MCV RBC AUTO: 93 FL (ref 80–100)
MONOCYTES # BLD AUTO: 0.43 X10*3/UL (ref 0.1–1)
MONOCYTES NFR BLD AUTO: 4.4 %
NEUTROPHILS # BLD AUTO: 9.09 X10*3/UL (ref 1.2–7.7)
NEUTROPHILS NFR BLD AUTO: 92.3 %
NRBC BLD-RTO: 0 /100 WBCS (ref 0–0)
PHOSPHATE SERPL-MCNC: 4.7 MG/DL (ref 2.5–4.9)
PLATELET # BLD AUTO: 78 X10*3/UL (ref 150–450)
POTASSIUM SERPL-SCNC: 3.9 MMOL/L (ref 3.5–5.3)
RBC # BLD AUTO: 2.86 X10*6/UL (ref 4–5.2)
SODIUM SERPL-SCNC: 135 MMOL/L (ref 136–145)
TACROLIMUS BLD-MCNC: 9.3 NG/ML
TIBC SERPL-MCNC: ABNORMAL UG/DL
UIBC SERPL-MCNC: <55 UG/DL (ref 110–370)
WBC # BLD AUTO: 9.9 X10*3/UL (ref 4.4–11.3)

## 2025-06-14 PROCEDURE — 83540 ASSAY OF IRON: CPT

## 2025-06-14 PROCEDURE — 99232 SBSQ HOSP IP/OBS MODERATE 35: CPT | Performed by: STUDENT IN AN ORGANIZED HEALTH CARE EDUCATION/TRAINING PROGRAM

## 2025-06-14 PROCEDURE — 85025 COMPLETE CBC W/AUTO DIFF WBC: CPT

## 2025-06-14 PROCEDURE — 2500000004 HC RX 250 GENERAL PHARMACY W/ HCPCS (ALT 636 FOR OP/ED): Mod: SE

## 2025-06-14 PROCEDURE — 2500000002 HC RX 250 W HCPCS SELF ADMINISTERED DRUGS (ALT 637 FOR MEDICARE OP, ALT 636 FOR OP/ED): Mod: SE

## 2025-06-14 PROCEDURE — 83550 IRON BINDING TEST: CPT

## 2025-06-14 PROCEDURE — 80197 ASSAY OF TACROLIMUS: CPT

## 2025-06-14 PROCEDURE — 99233 SBSQ HOSP IP/OBS HIGH 50: CPT | Performed by: INTERNAL MEDICINE

## 2025-06-14 PROCEDURE — 2500000001 HC RX 250 WO HCPCS SELF ADMINISTERED DRUGS (ALT 637 FOR MEDICARE OP): Mod: SE

## 2025-06-14 PROCEDURE — 82947 ASSAY GLUCOSE BLOOD QUANT: CPT

## 2025-06-14 PROCEDURE — 82728 ASSAY OF FERRITIN: CPT

## 2025-06-14 PROCEDURE — 1100000001 HC PRIVATE ROOM DAILY

## 2025-06-14 PROCEDURE — 36415 COLL VENOUS BLD VENIPUNCTURE: CPT

## 2025-06-14 PROCEDURE — 80069 RENAL FUNCTION PANEL: CPT

## 2025-06-14 PROCEDURE — 83735 ASSAY OF MAGNESIUM: CPT

## 2025-06-14 PROCEDURE — 82330 ASSAY OF CALCIUM: CPT

## 2025-06-14 RX ORDER — HYDRALAZINE HYDROCHLORIDE 25 MG/1
25 TABLET, FILM COATED ORAL EVERY 8 HOURS
Status: DISCONTINUED | OUTPATIENT
Start: 2025-06-14 | End: 2025-06-16 | Stop reason: HOSPADM

## 2025-06-14 RX ORDER — FUROSEMIDE 10 MG/ML
40 INJECTION INTRAMUSCULAR; INTRAVENOUS ONCE
Status: COMPLETED | OUTPATIENT
Start: 2025-06-14 | End: 2025-06-14

## 2025-06-14 RX ORDER — SODIUM BICARBONATE 650 MG/1
650 TABLET ORAL 2 TIMES DAILY
Status: DISCONTINUED | OUTPATIENT
Start: 2025-06-14 | End: 2025-06-16 | Stop reason: HOSPADM

## 2025-06-14 RX ADMIN — METHOCARBAMOL 500 MG: 500 TABLET ORAL at 12:51

## 2025-06-14 RX ADMIN — INSULIN LISPRO 4 UNITS: 100 INJECTION, SOLUTION INTRAVENOUS; SUBCUTANEOUS at 12:58

## 2025-06-14 RX ADMIN — METHOCARBAMOL 500 MG: 500 TABLET ORAL at 18:21

## 2025-06-14 RX ADMIN — TRAMADOL HYDROCHLORIDE 50 MG: 50 TABLET, COATED ORAL at 22:08

## 2025-06-14 RX ADMIN — ATOVAQUONE 1500 MG: 750 SUSPENSION ORAL at 22:05

## 2025-06-14 RX ADMIN — CARVEDILOL 25 MG: 25 TABLET, FILM COATED ORAL at 22:06

## 2025-06-14 RX ADMIN — LETERMOVIR 480 MG: 480 TABLET, FILM COATED ORAL at 09:40

## 2025-06-14 RX ADMIN — ACETAMINOPHEN 650 MG: 325 TABLET, FILM COATED ORAL at 06:02

## 2025-06-14 RX ADMIN — SENNOSIDES AND DOCUSATE SODIUM 1 TABLET: 8.6; 5 TABLET ORAL at 09:40

## 2025-06-14 RX ADMIN — METHOCARBAMOL 500 MG: 500 TABLET ORAL at 00:56

## 2025-06-14 RX ADMIN — METHYLPREDNISOLONE SODIUM SUCCINATE 125 MG: 125 INJECTION, POWDER, FOR SOLUTION INTRAMUSCULAR; INTRAVENOUS at 09:45

## 2025-06-14 RX ADMIN — PANTOPRAZOLE SODIUM 40 MG: 40 TABLET, DELAYED RELEASE ORAL at 16:53

## 2025-06-14 RX ADMIN — FUROSEMIDE 40 MG: 10 INJECTION, SOLUTION INTRAMUSCULAR; INTRAVENOUS at 12:52

## 2025-06-14 RX ADMIN — ACETAMINOPHEN 650 MG: 325 TABLET, FILM COATED ORAL at 00:56

## 2025-06-14 RX ADMIN — SODIUM BICARBONATE 650 MG: 650 TABLET ORAL at 09:39

## 2025-06-14 RX ADMIN — DARBEPOETIN ALFA 100 MCG: 100 INJECTION, SOLUTION INTRAVENOUS; SUBCUTANEOUS at 15:26

## 2025-06-14 RX ADMIN — INSULIN LISPRO 6 UNITS: 100 INJECTION, SOLUTION INTRAVENOUS; SUBCUTANEOUS at 16:53

## 2025-06-14 RX ADMIN — METHOCARBAMOL 500 MG: 500 TABLET ORAL at 06:02

## 2025-06-14 RX ADMIN — CARVEDILOL 25 MG: 25 TABLET, FILM COATED ORAL at 09:40

## 2025-06-14 RX ADMIN — TACROLIMUS 1 MG: 1 CAPSULE ORAL at 18:21

## 2025-06-14 RX ADMIN — HYDRALAZINE HYDROCHLORIDE 25 MG: 25 TABLET ORAL at 22:07

## 2025-06-14 RX ADMIN — POLYETHYLENE GLYCOL 3350 17 G: 17 POWDER, FOR SOLUTION ORAL at 09:58

## 2025-06-14 RX ADMIN — INSULIN HUMAN 8 UNITS: 100 INJECTION, SUSPENSION SUBCUTANEOUS at 09:41

## 2025-06-14 RX ADMIN — INSULIN LISPRO 2 UNITS: 100 INJECTION, SOLUTION INTRAVENOUS; SUBCUTANEOUS at 09:44

## 2025-06-14 RX ADMIN — ACETAMINOPHEN 650 MG: 325 TABLET, FILM COATED ORAL at 18:21

## 2025-06-14 RX ADMIN — CLOTRIMAZOLE 10 MG: 10 LOZENGE ORAL at 16:53

## 2025-06-14 RX ADMIN — SENNOSIDES AND DOCUSATE SODIUM 1 TABLET: 8.6; 5 TABLET ORAL at 22:07

## 2025-06-14 RX ADMIN — HYDRALAZINE HYDROCHLORIDE 25 MG: 25 TABLET ORAL at 13:12

## 2025-06-14 RX ADMIN — ACYCLOVIR 400 MG: 400 TABLET ORAL at 09:40

## 2025-06-14 RX ADMIN — SODIUM BICARBONATE 650 MG: 650 TABLET ORAL at 22:06

## 2025-06-14 RX ADMIN — PANTOPRAZOLE SODIUM 40 MG: 40 TABLET, DELAYED RELEASE ORAL at 06:02

## 2025-06-14 RX ADMIN — TACROLIMUS 1 MG: 1 CAPSULE ORAL at 06:02

## 2025-06-14 RX ADMIN — MYCOPHENOLATE MOFETIL 500 MG: 250 CAPSULE ORAL at 18:21

## 2025-06-14 RX ADMIN — ACETAMINOPHEN 650 MG: 325 TABLET, FILM COATED ORAL at 23:35

## 2025-06-14 RX ADMIN — CLOTRIMAZOLE 10 MG: 10 LOZENGE ORAL at 09:39

## 2025-06-14 RX ADMIN — CLOTRIMAZOLE 10 MG: 10 LOZENGE ORAL at 12:51

## 2025-06-14 RX ADMIN — ACYCLOVIR 400 MG: 400 TABLET ORAL at 22:07

## 2025-06-14 RX ADMIN — MYCOPHENOLATE MOFETIL 500 MG: 250 CAPSULE ORAL at 06:02

## 2025-06-14 RX ADMIN — ACETAMINOPHEN 650 MG: 325 TABLET, FILM COATED ORAL at 12:51

## 2025-06-14 RX ADMIN — SIMVASTATIN 20 MG: 20 TABLET, FILM COATED ORAL at 09:40

## 2025-06-14 ASSESSMENT — PAIN - FUNCTIONAL ASSESSMENT
PAIN_FUNCTIONAL_ASSESSMENT: 0-10

## 2025-06-14 ASSESSMENT — PAIN SCALES - GENERAL
PAINLEVEL_OUTOF10: 2
PAINLEVEL_OUTOF10: 0 - NO PAIN
PAINLEVEL_OUTOF10: 7
PAINLEVEL_OUTOF10: 3

## 2025-06-14 ASSESSMENT — PAIN DESCRIPTION - LOCATION: LOCATION: ABDOMEN

## 2025-06-14 NOTE — PROGRESS NOTES
INPATIENT TRANSPLANT NEPHROLOGY PROGRESS NOTE          REASON FOR CONSULT:  Immunosuppressive medication management and nephrology related issues.    SUBJECTIVE:    POD 4 DDKT  ITP, thrombocytopenia  Good UOP >1 L  Cr down from pre txp 8.4-->-->5-->4 this am  Tacrolimus level is pending from am  Due for Simulect tomorrow  Solumedrol 125 mg today  Give lasix 40 mg IV x one today for edema and weight gain (153-->158 kg today)  Encourage to ambulate and use incentive spirometry  Hematology consult  Aranesp for anemia  Send iron studies, ferritin  Replace bicarb    No acute events overnight.  Had BM  Pain over incision staple site    PHYCISCAL EXAMINATION:  Vitals:    06/14/25 1152   BP: 170/84   Pulse:    Resp:    Temp: 36 °C (96.8 °F)   SpO2:         06/12 1900 - 06/14 0659  In: 344 [P.O.:240]  Out: 2160 [Urine:2135; Drains:25]     Weight change: 0.795 kg (1 lb 12 oz)    General Appearance - NAD, Good speech, oriented and alert  HEENT - Supple. Not pale. No jaundice. No cervical lymphadenopathy. Pharynx and tonsils are not injected.  CVS - RRR. Normal S1/S2. No murmur, click , rub or gallop  Lungs- clear to auscultation bilaterally  Abdomen - soft , not tender, no guarding, no rigidity. No hepatosplenomegaly. Normal bowel sounds. No masses and ascites. S/P Kidney transplant. No wound infection.   Musculoskeletal /Extremities - no edema. Full ROM. No joint tenderness.   Neuro/Psych - appropriate mood and affect. Motor power V/V all extremities. CN I -XII were grossly intact.  Skin - No visible rash      MEDICATION LIST: REVIEWED  acetaminophen, 650 mg, q6h  acyclovir, 400 mg, BID  atovaquone, 1,500 mg, Daily  [START ON 6/15/2025] basiliximab, 20 mg, Once  carvedilol, 25 mg, q12h  clotrimazole, 10 mg, TID  darbepoetin ortega, 100 mcg, Once  furosemide, 40 mg, Once  insulin lispro, 0-10 Units, TID AC  letermovir, 480 mg, Daily  lidocaine, 1 patch, q24h  methocarbamol, 500 mg, q6h NARGIS  mycophenolate, 500 mg,  q12h  pantoprazole, 40 mg, BID AC  polyethylene glycol, 17 g, Daily  [START ON 6/15/2025] predniSONE, 20 mg, Daily  sennosides-docusate sodium, 1 tablet, BID  simvastatin, 20 mg, Daily  sodium bicarbonate, 650 mg, BID  tacrolimus, 1 mg, q12h NARGIS         dextrose, 12.5 g, q15 min PRN  dextrose, 12.5 g, q15 min PRN  dextrose, 25 g, q15 min PRN  dextrose, 25 g, q15 min PRN  glucagon, 1 mg, q15 min PRN  glucagon, 1 mg, q15 min PRN  glucagon, 1 mg, q15 min PRN  glucagon, 1 mg, q15 min PRN  hydrALAZINE, 25 mg, q8h PRN  naloxone, 0.2 mg, q5 min PRN  oxygen, , Continuous PRN - O2/gases  traMADol, 25 mg, q6h PRN  traMADol, 50 mg, q6h PRN        ALLERGY:  Allergies[1]    LABS:  Results for orders placed or performed during the hospital encounter of 06/10/25 (from the past 24 hours)   POCT GLUCOSE   Result Value Ref Range    POCT Glucose 217 (H) 74 - 99 mg/dL   POCT GLUCOSE   Result Value Ref Range    POCT Glucose 170 (H) 74 - 99 mg/dL   POCT GLUCOSE   Result Value Ref Range    POCT Glucose 216 (H) 74 - 99 mg/dL   POCT GLUCOSE   Result Value Ref Range    POCT Glucose 211 (H) 74 - 99 mg/dL   Magnesium   Result Value Ref Range    Magnesium 2.34 1.60 - 2.40 mg/dL   Renal Function Panel   Result Value Ref Range    Glucose 205 (H) 74 - 99 mg/dL    Sodium 135 (L) 136 - 145 mmol/L    Potassium 3.9 3.5 - 5.3 mmol/L    Chloride 103 98 - 107 mmol/L    Bicarbonate 18 (L) 21 - 32 mmol/L    Anion Gap 18 10 - 20 mmol/L    Urea Nitrogen 79 (H) 6 - 23 mg/dL    Creatinine 3.95 (H) 0.50 - 1.05 mg/dL    eGFR 12 (L) >60 mL/min/1.73m*2    Calcium 8.9 8.6 - 10.6 mg/dL    Phosphorus 4.7 2.5 - 4.9 mg/dL    Albumin 3.2 (L) 3.4 - 5.0 g/dL   CBC and Auto Differential   Result Value Ref Range    WBC 9.9 4.4 - 11.3 x10*3/uL    nRBC 0.0 0.0 - 0.0 /100 WBCs    RBC 2.86 (L) 4.00 - 5.20 x10*6/uL    Hemoglobin 8.9 (L) 12.0 - 16.0 g/dL    Hematocrit 26.5 (L) 36.0 - 46.0 %    MCV 93 80 - 100 fL    MCH 31.1 26.0 - 34.0 pg    MCHC 33.6 32.0 - 36.0 g/dL    RDW 14.7  (H) 11.5 - 14.5 %    Platelets 78 (L) 150 - 450 x10*3/uL    Neutrophils % 92.3 40.0 - 80.0 %    Immature Granulocytes %, Automated 0.6 0.0 - 0.9 %    Lymphocytes % 2.7 13.0 - 44.0 %    Monocytes % 4.4 2.0 - 10.0 %    Eosinophils % 0.0 0.0 - 6.0 %    Basophils % 0.0 0.0 - 2.0 %    Neutrophils Absolute 9.09 (H) 1.20 - 7.70 x10*3/uL    Immature Granulocytes Absolute, Automated 0.06 0.00 - 0.70 x10*3/uL    Lymphocytes Absolute 0.27 (L) 1.20 - 4.80 x10*3/uL    Monocytes Absolute 0.43 0.10 - 1.00 x10*3/uL    Eosinophils Absolute 0.00 0.00 - 0.70 x10*3/uL    Basophils Absolute 0.00 0.00 - 0.10 x10*3/uL   Calcium, Ionized   Result Value Ref Range    POCT Calcium, Ionized 1.16 1.1 - 1.33 mmol/L   Tacrolimus   Result Value Ref Range    Tacrolimus  9.3 <=15.0 ng/mL   POCT GLUCOSE   Result Value Ref Range    POCT Glucose 180 (H) 74 - 99 mg/dL   POCT GLUCOSE   Result Value Ref Range    POCT Glucose 244 (H) 74 - 99 mg/dL        IMAGING RESULT:  Reviewed with surgery.    ASSESSMENT AND PLAN:    60 y.o. 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 Transplant Nephrology following along with Transplant surgery team. Patient is [place] speaking (son mainly acts as ).    Kidney info: Kidney info: DCD, KDPI 73%, PRA 19%, CIT 22h, HCV -/-, CMV +/+, EBV +/+, HBV -/-, donor toxo neg, XM neg, DSA neg. no positive cultures.    Post op course notable for thrombocytopenia due to whic pt was transitioned off ATG to Simulect on POD #1. Did receive 1.5 mg/g ATG on POD #0. On slow taper Solumedrol (500 x3/250/250)    Immediate graft function. Post-op course unremarkable otherwise. Quiroz removed 6/13 AM.    On tac, MMF 500mg bid (age, body habitus), pred taper protocol.    Txp surger wants to keep pt as dry as possible due to poor quality of fascia, improve wound healing.    Atovaquone (to prevent thombocytopenia sec to Bactrim), Mycelex, Valcyte.      1. ESRD  S/P Kidney transplant.   - Renal allograft function:   Lab Results   Component Value Date    CREATININE 3.95 (H) 06/14/2025     Estimated Creatinine Clearance: 13.1 mL/min (A) (by C-G formula based on SCr of 3.95 mg/dL (H)).    Intake/Output Summary (Last 24 hours) at 6/14/2025 1227  Last data filed at 6/14/2025 0954  Gross per 24 hour   Intake --   Output 850 ml   Net -850 ml       - Indication for dialysis: NONE  - Continue to monitor UOP and Serum creatinine closely.   - Avoid nephrotoxic agents, NSAIDs and IV contrast   - Strict I/O.   - Renally dose all medications by the most recent CrCl from Cockcroft-Gault formula.    2. Immunosuppression   Induction  Thymo 100 mg 6/10  Simulect 20 mg 6/11; second dose due 6/15 (ordered)  FK level = PENDING, on tacrolimus 1 mg BID   mg BID (decreased due to thrombocytopenia)  Methylpred 500 mg 6/10-6/12, 250 mg 6/13, 125 mg 6/14, prednisone 20 mg daily starting 6/15    3. Anemia and Thrombocytopenia    Lab Results   Component Value Date    WBC 9.9 06/14/2025    HGB 8.9 (L) 06/14/2025    HCT 26.5 (L) 06/14/2025    MCV 93 06/14/2025    PLT 78 (L) 06/14/2025     -Continue to monitor Hgb   -No indications for PRBC transfusion   -SIMON  - Consult hematology for ITP    4. Electrolyte   Lab Results   Component Value Date    GLUCOSE 205 (H) 06/14/2025    CALCIUM 8.9 06/14/2025     (L) 06/14/2025    K 3.9 06/14/2025    CO2 18 (L) 06/14/2025     06/14/2025    BUN 79 (H) 06/14/2025    CREATININE 3.95 (H) 06/14/2025     - Reviewed renal profile.     6. Hypertension   Blood Pressures         6/13/2025  1623 6/14/2025  0554 6/14/2025  0747 6/14/2025  0940 6/14/2025  1152    BP: 155/82 162/83 168/84 153/73 170/84          -Goal BP < 140/90 mmHg   -continue current management     7. Wound/drain/andrew and pain management  -Defer it to surgery    8.  GI prophylaxis   - On PPI     9. DVT Prophylaxis  -Defer to primary team    10. ID prophylaxis  - CMV, PJP and fungal  prophylaxis per  Transplant [place] Protocol    * Case was presented at Multi D team round. Attending physician, consulting physician, , pharmacist, residents and fellow were present at the meeting.    For questions, please contact transplant nephrology page x [phone].      Ellyn Lowery    Transplant Nephrologist         [1]   Allergies  Allergen Reactions    Amlodipine Other     LEG SWELLING    Oxycodone Other     Dizziness, weakness

## 2025-06-14 NOTE — CARE PLAN
The patient's goals for the shift include  Get some rest    The clinical goals for the shift include pt will be HDS for the shift    Over the shift, the patient did make progress toward the following goals. No Barriers to progression

## 2025-06-14 NOTE — CARE PLAN
The patient's goals for the shift include pt pain will be managed throughout shift    The clinical goals for the shift include pt will remain pt will remain HDS throughout shift      Problem: Pain - Adult  Goal: Verbalizes/displays adequate comfort level or baseline comfort level  Outcome: Progressing     Problem: Chronic Conditions and Co-morbidities  Goal: Patient's chronic conditions and co-morbidity symptoms are monitored and maintained or improved  Outcome: Progressing     Problem: Fall/Injury  Goal: Verbalize understanding of personal risk factors for fall in the hospital  Outcome: Progressing

## 2025-06-14 NOTE — CONSULTS
Reason For Consult  chronic ITP and kidney transplant:     History Of Present Illness    60-year-old [place] female with a history of end-stage renal disease (ESRD) secondary to autosomal dominant polycystic kidney disease (ADPKD), chronic immune thrombocytopenic purpura (ITP), hypertension, diabetes mellitus type 2, and hyperlipidemia. She was admitted for a  donor kidney transplant (DDKT) on 6/10/2025.  The patient was diagnosed with ITP in  after evaluation for persistent, moderate thrombocytopenia (platelet count in the 90-110K/?L range), normocytic-macrocytic anemia, and no evidence of secondary causes. She has been managed on eltrombopag 12.5 mg daily, which successfully maintained platelet counts above 80K, with no history of bleeding or bruising, and no prior need for rescue therapies (IVIG or steroids).    Pre-transplant evaluation included a thorough infectious, hematologic, and cardiovascular workup, all of which were negative or stable. The patient underwent AV fistula placement but had not yet required dialysis, maintaining urine output of ~1-2L/day. Home medications included carvedilol, hydralazine, glipizide, iron and vitamin D supplements, and calcitriol, among others.  She received induction immunosuppression with anti-thymocyte globulin (ATG) on post-op day 0, but due to her baseline thrombocytopenia, the regimen was quickly transitioned to basiliximab (Simulect). Eltrombopag was briefly held jermaine-operatively and resumed post-op. She is also on a standard prednisone taper.    Since transplant, her platelet counts have remained stable (range: 70-110K), and she has had no bleeding complications. Post-operative course has been notable for anemia of chronic disease and stable renal allograft function. No episodes of infection, rejection, or transfusion reactions. Current labs show stable anemia (Hgb ~8.9), stable platelets (78K), and ferritin elevation consistent with chronic disease/iron  therapy.    She denies any symptoms of bleeding (no epistaxis, gum bleeding, hematuria, melena, petechiae, or bruising). No fevers, chills, or constitutional symptoms. No adverse effects to eltrombopag.     Timeline:  Chronic thrombocytopenia since 2021 (Plt 90-110K).  Eltrombopag started pre-listing to keep Plt >100K.  Underwent DDKT 6/10/2025: received 1.5 mg/kg ATG POD0; converted to Simulect POD1 due to low Plt.  Dominga-op: Promacta held for procedure, restarted post-op.  Solumedrol taper for induction, prednisone ongoing.  Platelets stable, most recent 78K (6/14/25).  No evidence of active bleeding, transfusion not indicated.  No acute events, stable post-op course.  Workup Summary:  CBC: Plt stable 70-110K, Hgb 8.9, WBC 9.9, mild macrocytic anemia.  Labs: Ferritin 1099, Fe 215, UIBC <55, Vit B12 WNL, no schistocytes, normal LFTs.  No DVT/PE (RU extremity US negative), no new cytopenias.  Pre-transplant: negative infectious workup (HIV, HBV, HCV, CMV+ IgG).  No bleeding, bruising, or heme emergencies.      Past Medical History  She has a past medical history of CKD (chronic kidney disease), Diabetes mellitus (Multi), HLD (hyperlipidemia), Hypertension, and PONV (postoperative nausea and vomiting).    Surgical History  She has a past surgical history that includes MR angio head wo IV contrast (08/07/2023) and AV fistula placement (Left).     Social History  She reports that she quit smoking about 40 years ago. Her smoking use included cigarettes. She has never been exposed to tobacco smoke. She has never used smokeless tobacco. She reports that she does not currently use alcohol. She reports that she does not use drugs.    Family History  Family History[1]     Allergies  Amlodipine and Oxycodone    Review of Systems  As above     Physical Exam  General: patient in NAD  HEENT: AT/NC  Resp: CTA B/L  CVS: normal s1 and s2  Abdomen: post op  Skin: warm, dry and intact  Neuro: AAO x3  Psych: cooperative     Last  Recorded Vitals  Blood pressure 166/83, pulse 62, temperature 36 °C (96.8 °F), temperature source Temporal, resp. rate 15, height 1.524 m (5'), weight 72.1 kg (158 lb 15.2 oz), SpO2 96%.    Relevant Results  Results for orders placed or performed during the hospital encounter of 06/10/25 (from the past 24 hours)   POCT GLUCOSE   Result Value Ref Range    POCT Glucose 217 (H) 74 - 99 mg/dL   POCT GLUCOSE   Result Value Ref Range    POCT Glucose 170 (H) 74 - 99 mg/dL   POCT GLUCOSE   Result Value Ref Range    POCT Glucose 216 (H) 74 - 99 mg/dL   POCT GLUCOSE   Result Value Ref Range    POCT Glucose 211 (H) 74 - 99 mg/dL   Magnesium   Result Value Ref Range    Magnesium 2.34 1.60 - 2.40 mg/dL   Renal Function Panel   Result Value Ref Range    Glucose 205 (H) 74 - 99 mg/dL    Sodium 135 (L) 136 - 145 mmol/L    Potassium 3.9 3.5 - 5.3 mmol/L    Chloride 103 98 - 107 mmol/L    Bicarbonate 18 (L) 21 - 32 mmol/L    Anion Gap 18 10 - 20 mmol/L    Urea Nitrogen 79 (H) 6 - 23 mg/dL    Creatinine 3.95 (H) 0.50 - 1.05 mg/dL    eGFR 12 (L) >60 mL/min/1.73m*2    Calcium 8.9 8.6 - 10.6 mg/dL    Phosphorus 4.7 2.5 - 4.9 mg/dL    Albumin 3.2 (L) 3.4 - 5.0 g/dL   CBC and Auto Differential   Result Value Ref Range    WBC 9.9 4.4 - 11.3 x10*3/uL    nRBC 0.0 0.0 - 0.0 /100 WBCs    RBC 2.86 (L) 4.00 - 5.20 x10*6/uL    Hemoglobin 8.9 (L) 12.0 - 16.0 g/dL    Hematocrit 26.5 (L) 36.0 - 46.0 %    MCV 93 80 - 100 fL    MCH 31.1 26.0 - 34.0 pg    MCHC 33.6 32.0 - 36.0 g/dL    RDW 14.7 (H) 11.5 - 14.5 %    Platelets 78 (L) 150 - 450 x10*3/uL    Neutrophils % 92.3 40.0 - 80.0 %    Immature Granulocytes %, Automated 0.6 0.0 - 0.9 %    Lymphocytes % 2.7 13.0 - 44.0 %    Monocytes % 4.4 2.0 - 10.0 %    Eosinophils % 0.0 0.0 - 6.0 %    Basophils % 0.0 0.0 - 2.0 %    Neutrophils Absolute 9.09 (H) 1.20 - 7.70 x10*3/uL    Immature Granulocytes Absolute, Automated 0.06 0.00 - 0.70 x10*3/uL    Lymphocytes Absolute 0.27 (L) 1.20 - 4.80 x10*3/uL     Monocytes Absolute 0.43 0.10 - 1.00 x10*3/uL    Eosinophils Absolute 0.00 0.00 - 0.70 x10*3/uL    Basophils Absolute 0.00 0.00 - 0.10 x10*3/uL   Calcium, Ionized   Result Value Ref Range    POCT Calcium, Ionized 1.16 1.1 - 1.33 mmol/L   Tacrolimus   Result Value Ref Range    Tacrolimus  9.3 <=15.0 ng/mL   Iron and TIBC   Result Value Ref Range    Iron 215 (H) 35 - 150 ug/dL    UIBC <55 (L) 110 - 370 ug/dL    TIBC      % Saturation     Ferritin   Result Value Ref Range    Ferritin 1,099 (H) 8 - 150 ng/mL   POCT GLUCOSE   Result Value Ref Range    POCT Glucose 180 (H) 74 - 99 mg/dL   POCT GLUCOSE   Result Value Ref Range    POCT Glucose 244 (H) 74 - 99 mg/dL         Assessment/Plan   Chronic ITP, well-controlled on low-dose eltrombopag, stable through transplant. Platelet counts remain sufficient for allograft function and post-op recovery. No bleeding, no indication for transfusion or acute ITP therapy.       #ITP with chronic stable thrombocytopenia:  -The patient dose need meet criteria for eltrombopag.  eltrombopag 12.5 mg daily was resumed In the oupt given planned procedure.   -No further workup is needed in the inpt setting  -Soluble transferrin pending  -Smear ordered  -No need for steroids or IVIG; reserve for acute bleeding.  -Monitor CBC daily.  -eltrombopag resumal will be discussed in the oupt setting  -Please send oupt Heme referral      Thank you for this consult. Patient seen and discussed with attending physician, Dr. Orozco who agrees with the above.      Cassanrda Tobar MD  Hematology-Oncology Fellow, PGY4  Hematology Consult Pager: [phone]         [1] No family history on file.

## 2025-06-15 VITALS
BODY MASS INDEX: 31.6 KG/M2 | HEIGHT: 60 IN | RESPIRATION RATE: 17 BRPM | SYSTOLIC BLOOD PRESSURE: 167 MMHG | HEART RATE: 63 BPM | WEIGHT: 160.94 LBS | DIASTOLIC BLOOD PRESSURE: 89 MMHG | OXYGEN SATURATION: 98 % | TEMPERATURE: 97.5 F

## 2025-06-15 LAB
ALBUMIN SERPL BCP-MCNC: 3.2 G/DL (ref 3.4–5)
ANION GAP SERPL CALC-SCNC: 14 MMOL/L (ref 10–20)
BASOPHILS # BLD AUTO: 0 X10*3/UL (ref 0–0.1)
BASOPHILS NFR BLD AUTO: 0 %
BUN SERPL-MCNC: 72 MG/DL (ref 6–23)
CA-I BLD-SCNC: 1.22 MMOL/L (ref 1.1–1.33)
CALCIUM SERPL-MCNC: 8.6 MG/DL (ref 8.6–10.6)
CHLORIDE SERPL-SCNC: 101 MMOL/L (ref 98–107)
CO2 SERPL-SCNC: 22 MMOL/L (ref 21–32)
CREAT SERPL-MCNC: 3.54 MG/DL (ref 0.5–1.05)
EGFRCR SERPLBLD CKD-EPI 2021: 14 ML/MIN/1.73M*2
EOSINOPHIL # BLD AUTO: 0 X10*3/UL (ref 0–0.7)
EOSINOPHIL NFR BLD AUTO: 0 %
ERYTHROCYTE [DISTWIDTH] IN BLOOD BY AUTOMATED COUNT: 14.3 % (ref 11.5–14.5)
GLUCOSE BLD MANUAL STRIP-MCNC: 173 MG/DL (ref 74–99)
GLUCOSE BLD MANUAL STRIP-MCNC: 196 MG/DL (ref 74–99)
GLUCOSE BLD MANUAL STRIP-MCNC: 205 MG/DL (ref 74–99)
GLUCOSE BLD MANUAL STRIP-MCNC: 229 MG/DL (ref 74–99)
GLUCOSE BLD MANUAL STRIP-MCNC: 245 MG/DL (ref 74–99)
GLUCOSE SERPL-MCNC: 196 MG/DL (ref 74–99)
HCT VFR BLD AUTO: 27.1 % (ref 36–46)
HGB BLD-MCNC: 9 G/DL (ref 12–16)
IMM GRANULOCYTES # BLD AUTO: 0.04 X10*3/UL (ref 0–0.7)
IMM GRANULOCYTES NFR BLD AUTO: 0.6 % (ref 0–0.9)
LYMPHOCYTES # BLD AUTO: 0.31 X10*3/UL (ref 1.2–4.8)
LYMPHOCYTES NFR BLD AUTO: 4.6 %
MAGNESIUM SERPL-MCNC: 2.28 MG/DL (ref 1.6–2.4)
MCH RBC QN AUTO: 31.1 PG (ref 26–34)
MCHC RBC AUTO-ENTMCNC: 33.2 G/DL (ref 32–36)
MCV RBC AUTO: 94 FL (ref 80–100)
MONOCYTES # BLD AUTO: 0.48 X10*3/UL (ref 0.1–1)
MONOCYTES NFR BLD AUTO: 7.2 %
NEUTROPHILS # BLD AUTO: 5.87 X10*3/UL (ref 1.2–7.7)
NEUTROPHILS NFR BLD AUTO: 87.6 %
NRBC BLD-RTO: 0 /100 WBCS (ref 0–0)
PHOSPHATE SERPL-MCNC: 4 MG/DL (ref 2.5–4.9)
PLATELET # BLD AUTO: 74 X10*3/UL (ref 150–450)
PLATELETS.RETICULATED NFR BLD AUTO: 17 % (ref 1–6)
POTASSIUM SERPL-SCNC: 3.6 MMOL/L (ref 3.5–5.3)
RBC # BLD AUTO: 2.89 X10*6/UL (ref 4–5.2)
SODIUM SERPL-SCNC: 133 MMOL/L (ref 136–145)
TACROLIMUS BLD-MCNC: 9.6 NG/ML
WBC # BLD AUTO: 6.7 X10*3/UL (ref 4.4–11.3)

## 2025-06-15 PROCEDURE — 85025 COMPLETE CBC W/AUTO DIFF WBC: CPT

## 2025-06-15 PROCEDURE — 82330 ASSAY OF CALCIUM: CPT

## 2025-06-15 PROCEDURE — 36415 COLL VENOUS BLD VENIPUNCTURE: CPT

## 2025-06-15 PROCEDURE — 82947 ASSAY GLUCOSE BLOOD QUANT: CPT

## 2025-06-15 PROCEDURE — 99255 IP/OBS CONSLTJ NEW/EST HI 80: CPT | Performed by: STUDENT IN AN ORGANIZED HEALTH CARE EDUCATION/TRAINING PROGRAM

## 2025-06-15 PROCEDURE — 2500000004 HC RX 250 GENERAL PHARMACY W/ HCPCS (ALT 636 FOR OP/ED): Mod: SE

## 2025-06-15 PROCEDURE — 99232 SBSQ HOSP IP/OBS MODERATE 35: CPT | Performed by: STUDENT IN AN ORGANIZED HEALTH CARE EDUCATION/TRAINING PROGRAM

## 2025-06-15 PROCEDURE — 80197 ASSAY OF TACROLIMUS: CPT

## 2025-06-15 PROCEDURE — 1100000001 HC PRIVATE ROOM DAILY

## 2025-06-15 PROCEDURE — 2500000005 HC RX 250 GENERAL PHARMACY W/O HCPCS: Mod: SE

## 2025-06-15 PROCEDURE — 2500000002 HC RX 250 W HCPCS SELF ADMINISTERED DRUGS (ALT 637 FOR MEDICARE OP, ALT 636 FOR OP/ED): Mod: SE

## 2025-06-15 PROCEDURE — 83735 ASSAY OF MAGNESIUM: CPT

## 2025-06-15 PROCEDURE — 2500000001 HC RX 250 WO HCPCS SELF ADMINISTERED DRUGS (ALT 637 FOR MEDICARE OP): Mod: SE

## 2025-06-15 PROCEDURE — 84238 ASSAY NONENDOCRINE RECEPTOR: CPT | Performed by: INTERNAL MEDICINE

## 2025-06-15 PROCEDURE — 85055 RETICULATED PLATELET ASSAY: CPT | Performed by: INTERNAL MEDICINE

## 2025-06-15 PROCEDURE — 80069 RENAL FUNCTION PANEL: CPT

## 2025-06-15 RX ORDER — INSULIN GLARGINE 100 [IU]/ML
5 INJECTION, SOLUTION SUBCUTANEOUS NIGHTLY
Status: DISCONTINUED | OUTPATIENT
Start: 2025-06-15 | End: 2025-06-16 | Stop reason: HOSPADM

## 2025-06-15 RX ORDER — ONDANSETRON HYDROCHLORIDE 2 MG/ML
INJECTION, SOLUTION INTRAVENOUS
Status: COMPLETED
Start: 2025-06-15 | End: 2025-06-15

## 2025-06-15 RX ORDER — GABAPENTIN 100 MG/1
100 CAPSULE ORAL DAILY
Status: DISCONTINUED | OUTPATIENT
Start: 2025-06-15 | End: 2025-06-16 | Stop reason: HOSPADM

## 2025-06-15 RX ORDER — ONDANSETRON HYDROCHLORIDE 2 MG/ML
4 INJECTION, SOLUTION INTRAVENOUS EVERY 8 HOURS PRN
Status: DISCONTINUED | OUTPATIENT
Start: 2025-06-15 | End: 2025-06-16 | Stop reason: HOSPADM

## 2025-06-15 RX ADMIN — LIDOCAINE 4% 1 PATCH: 40 PATCH TOPICAL at 12:53

## 2025-06-15 RX ADMIN — TACROLIMUS 1 MG: 1 CAPSULE ORAL at 18:13

## 2025-06-15 RX ADMIN — SENNOSIDES AND DOCUSATE SODIUM 1 TABLET: 8.6; 5 TABLET ORAL at 09:55

## 2025-06-15 RX ADMIN — CLOTRIMAZOLE 10 MG: 10 LOZENGE ORAL at 09:55

## 2025-06-15 RX ADMIN — GABAPENTIN 100 MG: 100 CAPSULE ORAL at 12:53

## 2025-06-15 RX ADMIN — HYDRALAZINE HYDROCHLORIDE 25 MG: 25 TABLET ORAL at 13:05

## 2025-06-15 RX ADMIN — POLYETHYLENE GLYCOL 3350 17 G: 17 POWDER, FOR SOLUTION ORAL at 09:56

## 2025-06-15 RX ADMIN — MYCOPHENOLATE MOFETIL 500 MG: 250 CAPSULE ORAL at 18:13

## 2025-06-15 RX ADMIN — ONDANSETRON HYDROCHLORIDE 4 MG: 2 INJECTION, SOLUTION INTRAVENOUS at 22:30

## 2025-06-15 RX ADMIN — INSULIN LISPRO 2 UNITS: 100 INJECTION, SOLUTION INTRAVENOUS; SUBCUTANEOUS at 13:05

## 2025-06-15 RX ADMIN — METHOCARBAMOL 500 MG: 500 TABLET ORAL at 23:57

## 2025-06-15 RX ADMIN — SODIUM BICARBONATE 650 MG: 650 TABLET ORAL at 20:15

## 2025-06-15 RX ADMIN — INSULIN LISPRO 4 UNITS: 100 INJECTION, SOLUTION INTRAVENOUS; SUBCUTANEOUS at 17:19

## 2025-06-15 RX ADMIN — ACETAMINOPHEN 650 MG: 325 TABLET, FILM COATED ORAL at 12:52

## 2025-06-15 RX ADMIN — METHOCARBAMOL 500 MG: 500 TABLET ORAL at 00:18

## 2025-06-15 RX ADMIN — PANTOPRAZOLE SODIUM 40 MG: 40 TABLET, DELAYED RELEASE ORAL at 06:13

## 2025-06-15 RX ADMIN — CARVEDILOL 25 MG: 25 TABLET, FILM COATED ORAL at 20:15

## 2025-06-15 RX ADMIN — METHOCARBAMOL 500 MG: 500 TABLET ORAL at 18:13

## 2025-06-15 RX ADMIN — METHOCARBAMOL 500 MG: 500 TABLET ORAL at 06:18

## 2025-06-15 RX ADMIN — TRAMADOL HYDROCHLORIDE 50 MG: 50 TABLET, COATED ORAL at 20:37

## 2025-06-15 RX ADMIN — ONDANSETRON 4 MG: 2 INJECTION INTRAMUSCULAR; INTRAVENOUS at 22:30

## 2025-06-15 RX ADMIN — ATOVAQUONE 1500 MG: 750 SUSPENSION ORAL at 20:15

## 2025-06-15 RX ADMIN — CLOTRIMAZOLE 10 MG: 10 LOZENGE ORAL at 17:19

## 2025-06-15 RX ADMIN — PREDNISONE 20 MG: 10 TABLET ORAL at 09:55

## 2025-06-15 RX ADMIN — INSULIN LISPRO 2 UNITS: 100 INJECTION, SOLUTION INTRAVENOUS; SUBCUTANEOUS at 10:07

## 2025-06-15 RX ADMIN — HYDRALAZINE HYDROCHLORIDE 25 MG: 25 TABLET ORAL at 20:37

## 2025-06-15 RX ADMIN — SODIUM CHLORIDE 20 MG: 9 INJECTION, SOLUTION INTRAVENOUS at 10:09

## 2025-06-15 RX ADMIN — INSULIN GLARGINE 5 UNITS: 100 INJECTION, SOLUTION SUBCUTANEOUS at 20:37

## 2025-06-15 RX ADMIN — ACETAMINOPHEN 650 MG: 325 TABLET, FILM COATED ORAL at 23:57

## 2025-06-15 RX ADMIN — LETERMOVIR 480 MG: 480 TABLET, FILM COATED ORAL at 09:55

## 2025-06-15 RX ADMIN — SENNOSIDES AND DOCUSATE SODIUM 1 TABLET: 8.6; 5 TABLET ORAL at 20:15

## 2025-06-15 RX ADMIN — ACYCLOVIR 400 MG: 400 TABLET ORAL at 20:15

## 2025-06-15 RX ADMIN — ACYCLOVIR 400 MG: 400 TABLET ORAL at 09:55

## 2025-06-15 RX ADMIN — SIMVASTATIN 20 MG: 20 TABLET, FILM COATED ORAL at 09:55

## 2025-06-15 RX ADMIN — ACETAMINOPHEN 650 MG: 325 TABLET, FILM COATED ORAL at 18:13

## 2025-06-15 RX ADMIN — PANTOPRAZOLE SODIUM 40 MG: 40 TABLET, DELAYED RELEASE ORAL at 17:19

## 2025-06-15 RX ADMIN — ACETAMINOPHEN 650 MG: 325 TABLET, FILM COATED ORAL at 06:13

## 2025-06-15 RX ADMIN — HYDRALAZINE HYDROCHLORIDE 25 MG: 25 TABLET ORAL at 06:13

## 2025-06-15 RX ADMIN — SODIUM BICARBONATE 650 MG: 650 TABLET ORAL at 09:55

## 2025-06-15 RX ADMIN — INSULIN HUMAN 12 UNITS: 100 INJECTION, SUSPENSION SUBCUTANEOUS at 10:07

## 2025-06-15 RX ADMIN — MYCOPHENOLATE MOFETIL 500 MG: 250 CAPSULE ORAL at 06:13

## 2025-06-15 RX ADMIN — TACROLIMUS 1 MG: 1 CAPSULE ORAL at 06:13

## 2025-06-15 RX ADMIN — CLOTRIMAZOLE 10 MG: 10 LOZENGE ORAL at 12:52

## 2025-06-15 RX ADMIN — METHOCARBAMOL 500 MG: 500 TABLET ORAL at 12:52

## 2025-06-15 ASSESSMENT — COGNITIVE AND FUNCTIONAL STATUS - GENERAL
MOBILITY SCORE: 20
WALKING IN HOSPITAL ROOM: A LITTLE
DAILY ACTIVITIY SCORE: 24
MOVING TO AND FROM BED TO CHAIR: A LITTLE
DAILY ACTIVITIY SCORE: 24
STANDING UP FROM CHAIR USING ARMS: A LITTLE
MOBILITY SCORE: 20
CLIMB 3 TO 5 STEPS WITH RAILING: A LITTLE
STANDING UP FROM CHAIR USING ARMS: A LITTLE
CLIMB 3 TO 5 STEPS WITH RAILING: A LITTLE
WALKING IN HOSPITAL ROOM: A LITTLE
MOVING TO AND FROM BED TO CHAIR: A LITTLE

## 2025-06-15 ASSESSMENT — PAIN - FUNCTIONAL ASSESSMENT: PAIN_FUNCTIONAL_ASSESSMENT: 0-10

## 2025-06-15 ASSESSMENT — PAIN SCALES - GENERAL
PAINLEVEL_OUTOF10: 8
PAINLEVEL_OUTOF10: 5 - MODERATE PAIN

## 2025-06-15 NOTE — PROGRESS NOTES
TRANSPLANT SURGERY PROGRESS NOTE    Luis Panda underwent transplant surgery on 6/10/2025 (Kidney) and was evaluated on multidisciplinary inpatient rounds.  I specifically evaluated the management of immunosuppression to ensure adequate exposure required to decrease the risk of rejection.  Furthermore, I reviewed potential side effects including tremor, hyperglycemia, leukopenia, infection, and other neurologic changes.  I reviewed and adjusted infectious prophylaxis based on the patients clinical condition and  protocols.     24 EVENTS: no acute events; family at the bedside. Some right thigh pain    DIAGNOSIS: Kidney replaced by transplant ([facility]) Z94.0    PHYSICAL EXAMINATION:  Last Recorded Vitals  Visit Vitals  /81 (BP Location: Right arm, Patient Position: Lying)   Pulse 62   Temp 36.4 °C (97.5 °F) (Temporal)   Resp 15      Intake/Output last 3 Shifts:    Intake/Output Summary (Last 24 hours) at 6/15/2025 1106  Last data filed at 6/14/2025 1534  Gross per 24 hour   Intake 480 ml   Output 625 ml   Net -145 ml      Vitals:    06/15/25 0539   Weight: 73 kg (160 lb 15 oz)      Gen: A+OX3; NAD  HEENT: PERRL, sclera anicteric, MMM  Cardiac: RRR  Chest: Normal inspiratory effort  Abdomen: S/NT/ND. Incision C/D/I.  Ext: No LE edema  Drain: serosanguinous    LABS:  Renal Function Panel   Result Value Ref Range    Glucose 192 (H) 74 - 99 mg/dL    Sodium 136 136 - 145 mmol/L    Potassium 3.5 3.5 - 5.3 mmol/L    Chloride 104 98 - 107 mmol/L    Bicarbonate 20 (L) 21 - 32 mmol/L    Anion Gap 16 10 - 20 mmol/L    Urea Nitrogen 80 (H) 6 - 23 mg/dL    Creatinine 5.07 (H) 0.50 - 1.05 mg/dL    eGFR 9 (L) >60 mL/min/1.73m*2    Calcium 9.1 8.6 - 10.6 mg/dL    Phosphorus 5.3 (H) 2.5 - 4.9 mg/dL    Albumin 3.2 (L) 3.4 - 5.0 g/dL   CBC and Auto Differential   Result Value Ref Range    WBC 12.4 (H) 4.4 - 11.3 x10*3/uL    nRBC 0.0 0.0 - 0.0 /100 WBCs    RBC 2.81 (L) 4.00 - 5.20 x10*6/uL    Hemoglobin 8.9 (L) 12.0 -  16.0 g/dL    Hematocrit 26.3 (L) 36.0 - 46.0 %    MCV 94 80 - 100 fL    MCH 31.7 26.0 - 34.0 pg    MCHC 33.8 32.0 - 36.0 g/dL    RDW 15.8 (H) 11.5 - 14.5 %    Platelets 90 (L) 150 - 450 x10*3/uL     ASSESSMENT AND PLAN:    Ms. Panda is a 60 y.o. female who underwent  transplant surgery on 6/10/2025 (Kidney).    DDKT 6/10/25  DCD 73% KDPI - CIT 22 hrs  PRA 0     Kidney allograft function              Creatinine downtrending today              Convert to Simulect induction given thrombocytopenia - second dose tomorrow              Keep close to dry weight and abdominal binder given poor quality fascia              40 IV lasix today     Immunosuppression              Thymo 1.5 mg/kg one dose  Simulect dose#2 6/15   Cont Tacrolimus 1 mg bid               mg bid (thrombocytopenia)  Solumedrol 500 mg x 3 (1st dose was intra-op), then 250 mg, 125 mg, Pred 20 mg      Thrombocytopenia  Stopped Thymo, reduced MMF, higher steroids  Hold Promacta periop given thrombosis risk - hematology consult  Not candidate for bactrim, plan atovaquone  No heparin subcutaneous; SCDs/PRAVEEN stockings     Hypertension              Home coreg 25 mg bid; currently holding hydralazine     Wound/drain              Analgesia: schedule tylenol and robaxin              Lidocaine patches              Oxycodone prn     Prophylaxis              Clotrimazole, protonix, atovaquone  CMV prophylaxis; letermovir     Dispo              Target discharge Monday 6/16              PT/OT today and OOB to chair               and son at bedside     Case was presented at Multi Disciplinary team rounds   Attending physician, consulting physician, , pharmacist, residents and fellow were present at the meeting.     Gina Adkins MD

## 2025-06-15 NOTE — CARE PLAN
The patient's goals for the shift include      The clinical goals for the shift include pt will remain  HDS throughout this shift      Problem: Pain - Adult  Goal: Verbalizes/displays adequate comfort level or baseline comfort level  Outcome: Progressing     Problem: Discharge Planning  Goal: Discharge to home or other facility with appropriate resources  Outcome: Progressing     Problem: Chronic Conditions and Co-morbidities  Goal: Patient's chronic conditions and co-morbidity symptoms are monitored and maintained or improved  Outcome: Progressing     Problem: Nutrition  Goal: Nutrient intake appropriate for maintaining nutritional needs  Outcome: Progressing     Problem: Pain  Goal: Takes deep breaths with improved pain control throughout the shift  Outcome: Progressing  Goal: Turns in bed with improved pain control throughout the shift  Outcome: Progressing  Goal: Walks with improved pain control throughout the shift  Outcome: Progressing  Goal: Performs ADL's with improved pain control throughout shift  Outcome: Progressing  Goal: Participates in PT with improved pain control throughout the shift  Outcome: Progressing     Problem: Fall/Injury  Goal: Verbalize understanding of personal risk factors for fall in the hospital  Outcome: Progressing  Goal: Verbalize understanding of risk factor reduction measures to prevent injury from fall in the home  Outcome: Progressing  Goal: Use assistive devices by end of the shift  Outcome: Progressing  Goal: Pace activities to prevent fatigue by end of the shift  Outcome: Progressing

## 2025-06-15 NOTE — CARE PLAN
Transitional Care Coordinator Note: Patient discussed with medical team, per medical team patient is not medically ready. Discharge dispo: [facility]  ADOD Next week.  Priyank Almaraz RN  Transitional Care Coordinator

## 2025-06-15 NOTE — PROGRESS NOTES
TRANSPLANT SURGERY PROGRESS NOTE    Luis Panda underwent transplant surgery on 6/10/2025 (Kidney) and was evaluated on multidisciplinary inpatient rounds.  I specifically evaluated the management of immunosuppression to ensure adequate exposure required to decrease the risk of rejection.  Furthermore, I reviewed potential side effects including tremor, hyperglycemia, leukopenia, infection, and other neurologic changes.  I reviewed and adjusted infectious prophylaxis based on the patients clinical condition and  protocols.     24 EVENTS: no acute events; family at the bedside. Some right thigh pain and lightheadedness with standing    DIAGNOSIS: Kidney replaced by transplant ([facility]) Z94.0    PHYSICAL EXAMINATION:  Last Recorded Vitals  Visit Vitals  /81 (BP Location: Right arm, Patient Position: Lying)   Pulse 62   Temp 36.4 °C (97.5 °F) (Temporal)   Resp 15      Intake/Output last 3 Shifts:    Intake/Output Summary (Last 24 hours) at 6/15/2025 1108  Last data filed at 6/14/2025 1534  Gross per 24 hour   Intake 480 ml   Output 625 ml   Net -145 ml      Vitals:    06/15/25 0539   Weight: 73 kg (160 lb 15 oz)      Gen: A+OX3; NAD  HEENT: PERRL, sclera anicteric, MMM  Cardiac: RRR  Chest: Normal inspiratory effort  Abdomen: S/NT/ND. Incision C/D/I.  Ext: No LE edema  Drain: serosanguinous    LABS:  Renal Function Panel   Result Value Ref Range    Glucose 192 (H) 74 - 99 mg/dL    Sodium 136 136 - 145 mmol/L    Potassium 3.5 3.5 - 5.3 mmol/L    Chloride 104 98 - 107 mmol/L    Bicarbonate 20 (L) 21 - 32 mmol/L    Anion Gap 16 10 - 20 mmol/L    Urea Nitrogen 80 (H) 6 - 23 mg/dL    Creatinine 5.07 (H) 0.50 - 1.05 mg/dL    eGFR 9 (L) >60 mL/min/1.73m*2    Calcium 9.1 8.6 - 10.6 mg/dL    Phosphorus 5.3 (H) 2.5 - 4.9 mg/dL    Albumin 3.2 (L) 3.4 - 5.0 g/dL   CBC and Auto Differential   Result Value Ref Range    WBC 12.4 (H) 4.4 - 11.3 x10*3/uL    nRBC 0.0 0.0 - 0.0 /100 WBCs    RBC 2.81 (L) 4.00 - 5.20  x10*6/uL    Hemoglobin 8.9 (L) 12.0 - 16.0 g/dL    Hematocrit 26.3 (L) 36.0 - 46.0 %    MCV 94 80 - 100 fL    MCH 31.7 26.0 - 34.0 pg    MCHC 33.8 32.0 - 36.0 g/dL    RDW 15.8 (H) 11.5 - 14.5 %    Platelets 90 (L) 150 - 450 x10*3/uL     ASSESSMENT AND PLAN:    Ms. Panda is a 60 y.o. female who underwent  transplant surgery on 6/10/2025 (Kidney).    DDKT 6/10/25  DCD 73% KDPI - CIT 22 hrs  PRA 0     Kidney allograft function              Creatinine downtrending today              Convert to Simulect induction given thrombocytopenia - second dose today              Keep close to dry weight and abdominal binder given poor quality fascia              No lasix today   Do orthostatics     Immunosuppression              Thymo 1.5 mg/kg one dose  Simulect dose#2 6/15   Cont Tacrolimus 1 mg bid               mg bid (thrombocytopenia)  Solumedrol 500 mg x 3 (1st dose was intra-op), then 250 mg, 125 mg, Pred 20 mg      Thrombocytopenia  Stopped Thymo, reduced MMF, higher steroids  Hold Promacta periop given thrombosis risk - hematology consult  Not candidate for bactrim, plan atovaquone  No heparin subcutaneous; SCDs/PRAVEEN stockings     Hypertension              Home coreg 25 mg bid; currently holding hydralazine     Wound/drain              Analgesia: schedule tylenol and robaxin    Trial low dose gabapentin for right thigh pain - no weakness              Lidocaine patches              Oxycodone prn     Prophylaxis              Clotrimazole, protonix, atovaquone  CMV prophylaxis; letermovir     Dispo              Target discharge Monday 6/16              PT/OT today and OOB to chair               and son at bedside     Case was presented at Multi Disciplinary team rounds   Attending physician, consulting physician, , pharmacist, residents and fellow were present at the meeting.     Gina Adkins MD

## 2025-06-15 NOTE — PROGRESS NOTES
Luis Panda is a 60 y.o. female on day 5 of admission presenting with Kidney transplant candidate.    Subjective   Patient seen and examined at bedside in no acute distress.  Patient denies any nausea, vomiting, difficulty breathing, abdominal pain, constipation or diarrhea.  She reports stable appetite.  Blood glucose trends reviewed.   I have reviewed histories, allergies and medications have been reviewed and there are no changes    Objective     Last Recorded Vitals  Blood pressure 158/81, pulse 62, temperature 36.4 °C (97.5 °F), temperature source Temporal, resp. rate 15, height 1.524 m (5'), weight 73 kg (160 lb 15 oz), SpO2 97%.  Intake/Output last 3 Shifts:  I/O last 3 completed shifts:  In: 900 (12.3 mL/kg) [P.O.:900]  Out: 1475 (20.2 mL/kg) [Urine:1400 (0.5 mL/kg/hr); Drains:75]  Weight: 73 kg   Physical Exam  General: patient in NAD  HEENT: AT/NC  Resp: CTA B/L  CVS: normal s1 and s2  Abdomen: post op  Skin: warm, dry and intact  Neuro: AAO x3  Psych: cooperative     ROS, PMH, FH/SH, surgical history and allergies have been reviewed.   Relevant Results  Results from last 7 days   Lab Units 06/15/25  0849 06/15/25  0521 06/15/25  0306 06/15/25  0029 06/14/25  2144 06/14/25  1610 06/14/25  0749 06/14/25  0516 06/13/25  0744 06/13/25  0527 06/12/25  0730 06/12/25  0516 06/11/25  0753 06/11/25  0527   POCT GLUCOSE mg/dL 173*  --  205* 245* 229* 292*   < >  --    < >  --    < >  --    < >  --    GLUCOSE mg/dL  --  196*  --   --   --   --   --  205*  --  192*  --  183*  --  174*    < > = values in this interval not displayed.     Results from last 7 days   Lab Units 06/15/25  0521 06/13/25  0527 06/12/25  0516 06/10/25  1715 06/10/25  0926   HEMOGLOBIN A1C %  --   --  5.6  --   --    SODIUM mmol/L 133*   < > 138   < > 140   POTASSIUM mmol/L 3.6   < > 3.8   < > 4.0   CHLORIDE mmol/L 101   < > 106   < > 109*   CO2 mmol/L 22   < > 20*   < > 16*   BUN mg/dL 72*   < > 66*   < > 58*   CREATININE mg/dL 3.54*   < >  5.99*   < > 8.40*   CALCIUM mg/dL 8.6   < > 8.5*   < > 9.4   ALBUMIN g/dL 3.2*   < > 3.4   < > 4.1   PROTEIN TOTAL g/dL  --   --   --   --  7.7   BILIRUBIN TOTAL mg/dL  --   --   --   --  0.6   ALK PHOS U/L  --   --   --   --  76   ALT U/L  --   --   --   --  13   AST U/L  --   --   --   --  22   GLUCOSE mg/dL 196*   < > 183*   < > 117*    < > = values in this interval not displayed.     Assessment & Plan  Kidney transplant candidate    PONV (postoperative nausea and vomiting)    ESRD (end stage renal disease) (Multi)    Luis Panda is a 60 y.o. 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  Patient is currently receiving glucocorticoid for immunosuppression resulting in hyperglycemia.  Endocrinology service is consulted for evaluation of hyperglycemia while on glucocorticoids.     Diabetes History  Type of diabetes: prediabetes since atleast 2021  Last A1c: 5.9 (2/2025)  Follows with: PCP  Home regimen: Glipizide 5 mg daily, decreased dose to 2.5 mg (2/2025) due to lower BG readings on 5 mg dose, no hx of insulin use  Hypoglycemia:  reports fasting BG of 80-90s on glipizide  LDL: 142(1/2024), on simvastatin 20 mg daily     Steroids:   IV Solu-Medrol 500 mg daily since 6/10- 6/12)  IV Solu-Medrol 250 mg (6/13)  IV Solu-Medrol 125 mg (6/14)  Tab Prednisone 20 mg (starting 6/15)     Nutrition: 75g CHO per meal     #Hyperglycemia in setting of prediabetes, current use of steroids and stressful state.  Increase insulin NPH to 12 units SQ to be given at time of Tab prednisone 20 mg administration.  Please link NPH and prednisone orders to make sure that they are given together.   Start Insulin Glargine 5 units nightly   C/w Insulin lispro sliding scale #2 with meals                     Adjust to q4h if NPO or if persistently hyperglycemic  Accuchecks (not BMP) TIDAC and at bedtime                     Adjust to q4h if NPO or if persistently  hyperglycemic  75 gm carb consistent diet  Diabetes educator following as patient is new to insulin use  Goal -180  Hypoglycemia bundle  Will continue to follow and titrate insulin accordingly  Please let us know incase you are changing the patient's diet or glucocorticoids dose as that will alter the regimen above      Discharge planning:   [x] patient may expect to discharge home on MDI, final plan TBD by titration  [x] patient has been seen by diabetes educator, Johanna Ernst.   [x] Primary team to order insulin pen, pen needles at discharge.  Patient has glucometer testing strips and lancets at home  [x] Will enroll pt in  pharm PP and with transplant PA team for follow-up within 2-4 weeks of discharge  [x] follow up with PCP in 1-2 wks      Recommendations communicated to primary team. Please reach out incase you have any questions or concerns.    The patient was seen and discussed with attending Dr. Rosy Baca MD  Endocrinology fellow

## 2025-06-16 ENCOUNTER — DOCUMENTATION (OUTPATIENT)
Dept: HOME HEALTH SERVICES | Facility: HOME HEALTH | Age: 60
End: 2025-06-16
Payer: MEDICAID

## 2025-06-16 ENCOUNTER — TELEPHONE (OUTPATIENT)
Facility: HOSPITAL | Age: 60
End: 2025-06-16

## 2025-06-16 ENCOUNTER — HOME HEALTH ADMISSION (OUTPATIENT)
Dept: HOME HEALTH SERVICES | Facility: HOME HEALTH | Age: 60
End: 2025-06-16
Payer: MEDICAID

## 2025-06-16 ENCOUNTER — PHARMACY VISIT (OUTPATIENT)
Dept: PHARMACY | Facility: CLINIC | Age: 60
End: 2025-06-16
Payer: MEDICAID

## 2025-06-16 VITALS
HEART RATE: 64 BPM | DIASTOLIC BLOOD PRESSURE: 77 MMHG | RESPIRATION RATE: 14 BRPM | HEIGHT: 60 IN | TEMPERATURE: 96.8 F | BODY MASS INDEX: 29.17 KG/M2 | SYSTOLIC BLOOD PRESSURE: 132 MMHG | OXYGEN SATURATION: 97 % | WEIGHT: 148.59 LBS

## 2025-06-16 DIAGNOSIS — Z94.0 KIDNEY REPLACED BY TRANSPLANT (HHS-HCC): ICD-10-CM

## 2025-06-16 LAB
ALBUMIN SERPL BCP-MCNC: 3.2 G/DL (ref 3.4–5)
ANION GAP SERPL CALC-SCNC: 14 MMOL/L (ref 10–20)
BASOPHILS # BLD AUTO: 0.01 X10*3/UL (ref 0–0.1)
BASOPHILS NFR BLD AUTO: 0.2 %
BUN SERPL-MCNC: 75 MG/DL (ref 6–23)
CA-I BLD-SCNC: 1.22 MMOL/L (ref 1.1–1.33)
CALCIUM SERPL-MCNC: 8.9 MG/DL (ref 8.6–10.6)
CHLORIDE SERPL-SCNC: 104 MMOL/L (ref 98–107)
CO2 SERPL-SCNC: 24 MMOL/L (ref 21–32)
CREAT SERPL-MCNC: 3.12 MG/DL (ref 0.5–1.05)
EGFRCR SERPLBLD CKD-EPI 2021: 16 ML/MIN/1.73M*2
EOSINOPHIL # BLD AUTO: 0 X10*3/UL (ref 0–0.7)
EOSINOPHIL NFR BLD AUTO: 0 %
ERYTHROCYTE [DISTWIDTH] IN BLOOD BY AUTOMATED COUNT: 14.1 % (ref 11.5–14.5)
GLUCOSE BLD MANUAL STRIP-MCNC: 142 MG/DL (ref 74–99)
GLUCOSE BLD MANUAL STRIP-MCNC: 169 MG/DL (ref 74–99)
GLUCOSE BLD MANUAL STRIP-MCNC: 173 MG/DL (ref 74–99)
GLUCOSE BLD MANUAL STRIP-MCNC: 219 MG/DL (ref 74–99)
GLUCOSE SERPL-MCNC: 152 MG/DL (ref 74–99)
HCT VFR BLD AUTO: 26.2 % (ref 36–46)
HGB BLD-MCNC: 8.9 G/DL (ref 12–16)
IMM GRANULOCYTES # BLD AUTO: 0.05 X10*3/UL (ref 0–0.7)
IMM GRANULOCYTES NFR BLD AUTO: 0.8 % (ref 0–0.9)
LYMPHOCYTES # BLD AUTO: 0.41 X10*3/UL (ref 1.2–4.8)
LYMPHOCYTES NFR BLD AUTO: 6.8 %
MAGNESIUM SERPL-MCNC: 2.33 MG/DL (ref 1.6–2.4)
MCH RBC QN AUTO: 31.4 PG (ref 26–34)
MCHC RBC AUTO-ENTMCNC: 34 G/DL (ref 32–36)
MCV RBC AUTO: 93 FL (ref 80–100)
MONOCYTES # BLD AUTO: 0.56 X10*3/UL (ref 0.1–1)
MONOCYTES NFR BLD AUTO: 9.3 %
NEUTROPHILS # BLD AUTO: 4.98 X10*3/UL (ref 1.2–7.7)
NEUTROPHILS NFR BLD AUTO: 82.9 %
NRBC BLD-RTO: 0.5 /100 WBCS (ref 0–0)
PHOSPHATE SERPL-MCNC: 4 MG/DL (ref 2.5–4.9)
PLATELET # BLD AUTO: 70 X10*3/UL (ref 150–450)
POTASSIUM SERPL-SCNC: 3.8 MMOL/L (ref 3.5–5.3)
RBC # BLD AUTO: 2.83 X10*6/UL (ref 4–5.2)
SODIUM SERPL-SCNC: 138 MMOL/L (ref 136–145)
TACROLIMUS BLD-MCNC: 12.6 NG/ML
WBC # BLD AUTO: 6 X10*3/UL (ref 4.4–11.3)

## 2025-06-16 PROCEDURE — 36415 COLL VENOUS BLD VENIPUNCTURE: CPT

## 2025-06-16 PROCEDURE — 2500000004 HC RX 250 GENERAL PHARMACY W/ HCPCS (ALT 636 FOR OP/ED): Mod: SE

## 2025-06-16 PROCEDURE — 2500000001 HC RX 250 WO HCPCS SELF ADMINISTERED DRUGS (ALT 637 FOR MEDICARE OP): Mod: SE

## 2025-06-16 PROCEDURE — 82947 ASSAY GLUCOSE BLOOD QUANT: CPT

## 2025-06-16 PROCEDURE — 80197 ASSAY OF TACROLIMUS: CPT

## 2025-06-16 PROCEDURE — RXMED WILLOW AMBULATORY MEDICATION CHARGE

## 2025-06-16 PROCEDURE — 99239 HOSP IP/OBS DSCHRG MGMT >30: CPT

## 2025-06-16 PROCEDURE — 2500000005 HC RX 250 GENERAL PHARMACY W/O HCPCS: Mod: SE

## 2025-06-16 PROCEDURE — 97165 OT EVAL LOW COMPLEX 30 MIN: CPT | Mod: GO

## 2025-06-16 PROCEDURE — 83735 ASSAY OF MAGNESIUM: CPT

## 2025-06-16 PROCEDURE — 2500000002 HC RX 250 W HCPCS SELF ADMINISTERED DRUGS (ALT 637 FOR MEDICARE OP, ALT 636 FOR OP/ED): Mod: SE

## 2025-06-16 PROCEDURE — 80069 RENAL FUNCTION PANEL: CPT

## 2025-06-16 PROCEDURE — 82330 ASSAY OF CALCIUM: CPT

## 2025-06-16 PROCEDURE — 99232 SBSQ HOSP IP/OBS MODERATE 35: CPT | Performed by: STUDENT IN AN ORGANIZED HEALTH CARE EDUCATION/TRAINING PROGRAM

## 2025-06-16 PROCEDURE — 99232 SBSQ HOSP IP/OBS MODERATE 35: CPT | Performed by: INTERNAL MEDICINE

## 2025-06-16 PROCEDURE — 85025 COMPLETE CBC W/AUTO DIFF WBC: CPT

## 2025-06-16 RX ORDER — HYDRALAZINE HYDROCHLORIDE 25 MG/1
25 TABLET, FILM COATED ORAL 3 TIMES DAILY
Qty: 90 TABLET | Refills: 0 | Status: SHIPPED | OUTPATIENT
Start: 2025-06-16 | End: 2025-06-20 | Stop reason: SDUPTHER

## 2025-06-16 RX ORDER — METHOCARBAMOL 500 MG/1
500 TABLET, FILM COATED ORAL EVERY 6 HOURS PRN
Qty: 20 TABLET | Refills: 0 | Status: SHIPPED | OUTPATIENT
Start: 2025-06-16 | End: 2025-06-21

## 2025-06-16 RX ORDER — GABAPENTIN 100 MG/1
100 CAPSULE ORAL DAILY
Qty: 30 CAPSULE | Refills: 0 | Status: SHIPPED | OUTPATIENT
Start: 2025-06-16 | End: 2025-06-20 | Stop reason: SDUPTHER

## 2025-06-16 RX ORDER — PEN NEEDLE, DIABETIC 30 GX3/16"
1 NEEDLE, DISPOSABLE MISCELLANEOUS
Qty: 100 EACH | Refills: 0 | Status: SHIPPED | OUTPATIENT
Start: 2025-06-16 | End: 2025-07-16

## 2025-06-16 RX ORDER — TRAMADOL HYDROCHLORIDE 50 MG/1
50 TABLET, FILM COATED ORAL EVERY 6 HOURS PRN
Qty: 15 TABLET | Refills: 0 | Status: SHIPPED | OUTPATIENT
Start: 2025-06-16 | End: 2025-06-21

## 2025-06-16 RX ORDER — INSULIN LISPRO 100 [IU]/ML
0-10 INJECTION, SOLUTION INTRAVENOUS; SUBCUTANEOUS
Qty: 15 ML | Refills: 0 | Status: SHIPPED | OUTPATIENT
Start: 2025-06-16 | End: 2025-06-16

## 2025-06-16 RX ORDER — INSULIN GLARGINE 100 [IU]/ML
5 INJECTION, SOLUTION SUBCUTANEOUS NIGHTLY
Qty: 15 ML | Refills: 11 | Status: SHIPPED | OUTPATIENT
Start: 2025-06-16 | End: 2025-06-16 | Stop reason: HOSPADM

## 2025-06-16 RX ORDER — INSULIN LISPRO 100 [IU]/ML
0-10 INJECTION, SOLUTION INTRAVENOUS; SUBCUTANEOUS
Start: 2025-06-16 | End: 2025-07-16

## 2025-06-16 RX ORDER — TACROLIMUS 0.5 MG/1
0.5 CAPSULE ORAL
Status: DISCONTINUED | OUTPATIENT
Start: 2025-06-16 | End: 2025-06-16 | Stop reason: HOSPADM

## 2025-06-16 RX ADMIN — CLOTRIMAZOLE 10 MG: 10 LOZENGE ORAL at 09:12

## 2025-06-16 RX ADMIN — HYDRALAZINE HYDROCHLORIDE 25 MG: 25 TABLET ORAL at 12:22

## 2025-06-16 RX ADMIN — METHOCARBAMOL 500 MG: 500 TABLET ORAL at 12:21

## 2025-06-16 RX ADMIN — PREDNISONE 20 MG: 10 TABLET ORAL at 09:12

## 2025-06-16 RX ADMIN — CLOTRIMAZOLE 10 MG: 10 LOZENGE ORAL at 12:21

## 2025-06-16 RX ADMIN — HYDRALAZINE HYDROCHLORIDE 25 MG: 25 TABLET ORAL at 06:12

## 2025-06-16 RX ADMIN — ACETAMINOPHEN 650 MG: 325 TABLET, FILM COATED ORAL at 12:21

## 2025-06-16 RX ADMIN — INSULIN HUMAN 12 UNITS: 100 INJECTION, SUSPENSION SUBCUTANEOUS at 09:14

## 2025-06-16 RX ADMIN — MYCOPHENOLATE MOFETIL 500 MG: 250 CAPSULE ORAL at 06:11

## 2025-06-16 RX ADMIN — SODIUM BICARBONATE 650 MG: 650 TABLET ORAL at 09:12

## 2025-06-16 RX ADMIN — LETERMOVIR 480 MG: 480 TABLET, FILM COATED ORAL at 09:14

## 2025-06-16 RX ADMIN — GABAPENTIN 100 MG: 100 CAPSULE ORAL at 09:12

## 2025-06-16 RX ADMIN — LIDOCAINE 4% 1 PATCH: 40 PATCH TOPICAL at 12:22

## 2025-06-16 RX ADMIN — METHOCARBAMOL 500 MG: 500 TABLET ORAL at 06:11

## 2025-06-16 RX ADMIN — ACETAMINOPHEN 650 MG: 325 TABLET, FILM COATED ORAL at 06:11

## 2025-06-16 RX ADMIN — POLYETHYLENE GLYCOL 3350 17 G: 17 POWDER, FOR SOLUTION ORAL at 09:14

## 2025-06-16 RX ADMIN — INSULIN LISPRO 4 UNITS: 100 INJECTION, SOLUTION INTRAVENOUS; SUBCUTANEOUS at 12:21

## 2025-06-16 RX ADMIN — ACYCLOVIR 400 MG: 400 TABLET ORAL at 09:12

## 2025-06-16 RX ADMIN — SENNOSIDES AND DOCUSATE SODIUM 1 TABLET: 8.6; 5 TABLET ORAL at 09:12

## 2025-06-16 RX ADMIN — CARVEDILOL 25 MG: 25 TABLET, FILM COATED ORAL at 09:12

## 2025-06-16 RX ADMIN — TACROLIMUS 1 MG: 1 CAPSULE ORAL at 06:12

## 2025-06-16 RX ADMIN — SIMVASTATIN 20 MG: 20 TABLET, FILM COATED ORAL at 09:14

## 2025-06-16 RX ADMIN — PANTOPRAZOLE SODIUM 40 MG: 40 TABLET, DELAYED RELEASE ORAL at 06:11

## 2025-06-16 ASSESSMENT — COGNITIVE AND FUNCTIONAL STATUS - GENERAL
TOILETING: A LOT
PERSONAL GROOMING: A LITTLE
HELP NEEDED FOR BATHING: A LOT
DRESSING REGULAR UPPER BODY CLOTHING: A LITTLE
CLIMB 3 TO 5 STEPS WITH RAILING: A LITTLE
DRESSING REGULAR LOWER BODY CLOTHING: A LOT
DAILY ACTIVITIY SCORE: 16
DAILY ACTIVITIY SCORE: 24
STANDING UP FROM CHAIR USING ARMS: A LITTLE
MOVING TO AND FROM BED TO CHAIR: A LITTLE
MOBILITY SCORE: 20
WALKING IN HOSPITAL ROOM: A LITTLE

## 2025-06-16 ASSESSMENT — PAIN SCALES - GENERAL
PAINLEVEL_OUTOF10: 5 - MODERATE PAIN
PAINLEVEL_OUTOF10: 6

## 2025-06-16 ASSESSMENT — PAIN - FUNCTIONAL ASSESSMENT
PAIN_FUNCTIONAL_ASSESSMENT: 0-10
PAIN_FUNCTIONAL_ASSESSMENT: 0-10

## 2025-06-16 ASSESSMENT — ACTIVITIES OF DAILY LIVING (ADL)
ADL_ASSISTANCE: INDEPENDENT
BATHING_ASSISTANCE: MODERATE

## 2025-06-16 NOTE — PROGRESS NOTES
Pharmacist Transplant Discharge Note    Medications for Luis Panda were reviewed in conjunction with the multidisciplinary transplant team. The pharmacist is in agreement with the plan of care for medications at this time.     Approximately 20 minutes were spent with this patient's son providing follow-up education and reviewing her finalized list of discharge medications. An updated outpatient dosing schedule was provided to the patient's son. All questions were answered to the patient's son's satisfaction, and the patient's son confirmed understanding.    The patient had her medications filled at [facility] and delivered prior to discharge. Further educational reinforcement should be provided in the outpatient clinic.    Misty Mejia, PharmD, BCTXP  Solid Organ Transplant Clinical Pharmacy Specialist

## 2025-06-16 NOTE — DISCHARGE SUMMARY
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 Melvin with a KDPI of 73% and PRA of 19%. Donor was Hepc -/-and has not met risk factors. EBV +/+. Left donor kidney transplanted to patient right side. Dry weight is 66kg (discharge weight is 67.4 kg).  Pt received a total of 1.5 mg/kg total of thymoglobulin induction which caused significant thrombocytopenia. Simulect given in conjunction with 500mg IV solumedrol.  Pt was initiated on Tacrolimus & Cellcept immunosuppression in conjunction with extended IV solumedrol taper.  Pt was started on acyclovir and letermovir (CMV D + / R + ) and atovaquone for CMV & PCP prophylaxis per protocol. She was started on clotrimazole as antifungal coverage per protocol. Operative course was uncomplicated.  Hospital course was uncomplicated. Quiroz catheter was removed on POD #3 and the patient is voiding spontaneously. Pt did not require dialysis and electrolytes remain stable. Dialysis access via LUE but never required dialysis pre-transplant. BP & glucose under good control.     Pt is tolerating a diabetic diet, maintaining adequate hydration with oral intake, ambulating independently and having BMs. Immunosuppression was managed by the transplant team. Patient is in stable condition for discharge home with renal telehealth today and home care. RX delivered to bedside prior to discharge. The patient will f/u in the transplant institute and have labs drawn in the walk-in clinic.     Visit Vitals  /77 (BP Location: Right arm, Patient Position: Lying)   Pulse 60   Temp 35.9 °C (96.6 °F)  (Temporal)   Resp 12     Vitals:    06/16/25 0606   Weight: 67.4 kg (148 lb 9.4 oz)       Immunization History   Administered Date(s) Administered    Moderna COVID-19 vaccine, bivalent, blue cap/gray label *Check age/dose* 12/20/2022    Moderna SARS-CoV-2 Vaccination 07/13/2021, 08/10/2021       Results        Pertinent Physical Exam At Time of Discharge  Physical Exam  Vitals reviewed.   Constitutional:       General: She is not in acute distress.  HENT:      Head: Normocephalic.      Mouth/Throat:      Mouth: Mucous membranes are moist.   Eyes:      Extraocular Movements: Extraocular movements intact.   Cardiovascular:      Rate and Rhythm: Normal rate and regular rhythm.      Heart sounds: Normal heart sounds.   Pulmonary:      Effort: Pulmonary effort is normal.      Breath sounds: Normal breath sounds.   Abdominal:      Palpations: Abdomen is soft.   Musculoskeletal:      Right lower leg: Edema present.      Left lower leg: Edema present.      Comments: +1 bilaterally   Skin:     General: Skin is warm and dry.      Comments: Right roque incision intact with staples, JOHN, well approximated. ULI drain with serosang drainage right abdomen   Neurological:      General: No focal deficit present.      Mental Status: She is alert and oriented to person, place, and time.   Psychiatric:         Behavior: Behavior normal.         Thought Content: Thought content normal.         Home Medications     Medication List      PAUSE taking these medications     aspirin 81 mg EC tablet; Wait to take this until your doctor or other   care provider tells you to start again.   Promacta 12.5 mg tablet; Wait to take this until your doctor or other   care provider tells you to start again.; Generic drug: eltrombopag   olamine; Take 1 tablet (12.5 mg) by mouth once daily in the morning. Take   before meals. Take on an empty stomach, 1 hour before or 2 hours afer a   meal.     START taking these medications     acetaminophen 325 mg  "tablet; Commonly known as: Tylenol; Take 2 tablets   (650 mg) by mouth every 6 hours if needed for mild pain (1 - 3) for up to   10 days.   acyclovir 400 mg tablet; Commonly known as: Zovirax; Take 1 tablet (400   mg) by mouth 2 times a day.   atovaquone 750 mg/5 mL suspension; Commonly known as: Mepron; Take 10 mL   (1,500 mg) by mouth once daily.   BD Ultra-Fine Linda Pen Needle 32 gauge x 5/32\" needle; Generic drug: pen   needle, diabetic; Use 1 each 5 times a day.   clotrimazole 10 mg elissa; Commonly known as: Mycelex; Use 1 tablet (10   mg) in the mouth or throat 3 times daily (morning, midday, late   afternoon).   docusate sodium 100 mg capsule; Commonly known as: Colace; Take 1   capsule (100 mg) by mouth 2 times a day as needed for constipation for up   to 10 days.   gabapentin 100 mg capsule; Commonly known as: Neurontin; Take 1 capsule   (100 mg) by mouth once daily.   insulin lispro 100 unit/mL pen; Commonly known as: HumaLOG; Inject 0-10   Units under the skin 3 times a day before meals. Take as directed per   insulin instructions. Hypoglycemia protocol Call LIP unit(s) if Blood   Glucose is between 0 - 70 mg/d  0 unit(s) if Blood glucose is between     1 unit(s) if Blood glucose is between 201-250  2 unit(s) if Blood   glucose is between 251-300  3 unit(s) if Bloodglucose is between 301-350    4 unit(s) if Blood glucose is between 351-400  If blood glucose is greater   than 400 mg/dL, give max insulin per sliding scale AND then contact   provider.   insulin NPH (Isophane) 100 unit/mL (3 mL) pen; Commonly known as:   HumuLIN N,NovoLIN N; Inject 12 Units under the skin once every 24 hours.   Take as directed per insulin instructions.   methocarbamol 500 mg tablet; Commonly known as: Robaxin; Take 1 tablet   (500 mg) by mouth every 6 hours if needed for muscle spasms for up to 5   days.   mycophenolate 250 mg capsule; Commonly known as: Cellcept; Take 4   capsules (1,000 mg) by mouth 2 times a day.   " pantoprazole 40 mg EC tablet; Commonly known as: ProtoNix; Take 1 tablet   (40 mg) by mouth once daily in the morning. Take before meals. Do not   crush, chew, or split.   polyethylene glycol 17 gram/dose powder; Commonly known as: Glycolax,   Miralax; Mix 17 g of powder and drink once daily.   predniSONE 5 mg tablet; Commonly known as: Deltasone; Take 4 tablets (20   mg) by mouth once daily. Do not fill before Radha 15, 2025.   Prevymis 480 mg tablet; Generic drug: letermovir; Take 1 tablet (480 mg)   by mouth once daily.   tacrolimus 0.5 mg capsule; Commonly known as: Prograf; Take 1 capsule   (0.5 mg) by mouth 2 times a day.   traMADol 50 mg tablet; Commonly known as: Ultram; Take 1 tablet (50 mg)   by mouth every 6 hours if needed for severe pain (7 - 10) for up to 5   days.     CHANGE how you take these medications     hydrALAZINE 25 mg tablet; Commonly known as: Apresoline; Take 1 tablet   (25 mg) by mouth 3 times a day.; What changed: medication strength, how   much to take     CONTINUE taking these medications     carvedilol 25 mg tablet; Commonly known as: Coreg   simvastatin 20 mg tablet; Commonly known as: Zocor   traZODone 50 mg tablet; Commonly known as: Desyrel   TRUEplus Lancets 33 gauge misc; Generic drug: lancets     STOP taking these medications     calcitriol 0.25 mcg capsule; Commonly known as: Rocaltrol   cholecalciferol 50 mcg (2,000 units) tablet; Commonly known as: Vitamin   D-3   ferrous sulfate 325 mg (65 mg elemental) tablet   glipiZIDE 2.5 mg tablet   omeprazole 40 mg DR capsule; Commonly known as: PriLOSEC   ondansetron 4 mg tablet; Commonly known as: Zofran   sevelamer carbonate 800 mg tablet; Commonly known as: Renvela   sodium bicarbonate 650 mg tablet       Outpatient Follow-Up  Future Appointments   Date Time Provider Department [facility]   6/20/2025 11:30 AM TXP ABDOMINAL SURGEON [id number]   6/25/2025 10:00 AM TXP ABDOMINAL SURGEON [id number]   7/2/2025  9:30 AM  TXP ABDOMINAL SURGEON [id number]   7/7/2025  2:00 PM MD MARY Reeves [facility]   7/9/2025  9:30 AM TXP ABDOMINAL SURGEON [id number]   7/11/2025  8:00 AM Shante Sr RDN, AMY [id number]   7/16/2025 10:00 AM TXP ABDOMINAL SURGEON [id number]   11/10/2025 10:30 AM Karina Mendoza PA-C [id number]      spent 40 minutes managing this patients discharge    Nuris Yanes, APRN-CNP

## 2025-06-16 NOTE — NURSING NOTE
Transplant Coordinator Note    Inpatient Discharge Education Provided to patient today. The following topics were reviewed:   signs and symptoms of rejection   signs and symptoms of infection   transplant medication indications   transplant medication administration   transplant medication side effects   the importance of following post-transplant lab and appointment schedules   Patient verbalized good understanding of all information provided   Patient demonstrated ability to fill pillbox without error or difficulty   Patient successfully completed discharge education test   Provided to patient all [facility] contact information for both during and after hours    Outpatient Plan:  Tacrolimus 0.5 mg BID;  mg BID (d/t thrombocytopenia), pred 20 mg daily  Thymo x1 dose given d/t thrombocytopenia, gave Simulect  ULI at discharge  Home medications taken from pill packs and placed into pill box at discharge: Carvedilol, Simvastatin, Trazodone  NPH 12u qAM + Humalog SSI - patient is new to insulin; CGM placed at discharge  Heme outpatient to determine when patient should restart ASA and Eltrombopag (pt son reported she had not been taking aspirin for the last ~1 year and the eltrombopag for the last ~3 weeks)  Clinic Friday then Wednesday's  Labs Monday/Thursday @ [facility]  Pill box assistance in clinic  Pt is Armenian speaking, son and grandson speak English

## 2025-06-16 NOTE — NURSING NOTE
Ms. Panda is to be discharged to home today.  Education re insulin schedule; insulin pen and CGM done with her two sons.  Insulin pen teaching done with son and he did return demo correctly.  Insulin chart written out with NPH/Prednisone taper.  He is able to verbalize correctly how to follow.  Sliding scale for humalog with meals written out -- he is able to determine meal time insulin dose correctly based on BG scenarios we practiced.    DexCom G7 sensor applied by son to her left upper arm.  Education with son on how this tech works.    Will sign off at this time as she is to be discharged home.  Time spent:  70 mins.

## 2025-06-16 NOTE — PROGRESS NOTES
Luis Panda is a 60 y.o. female on day 6 of admission presenting with Kidney transplant candidate.    Subjective   Patient seen and examined at bedside in no acute distress.  Patient denies any nausea, vomiting, difficulty breathing, abdominal pain, constipation or diarrhea. She reports stable appetite.  Blood glucose trends reviewed.   I have reviewed histories, allergies and medications have been reviewed and there are no changes    Objective     Last Recorded Vitals  Blood pressure 132/77, pulse 64, temperature 36 °C (96.8 °F), temperature source Temporal, resp. rate 14, height 1.524 m (5'), weight 67.4 kg (148 lb 9.4 oz), SpO2 97%.  Intake/Output last 3 Shifts:  I/O last 3 completed shifts:  In: 360 (5.3 mL/kg) [P.O.:360]  Out: 50 (0.7 mL/kg) [Drains:50]  Weight: 67.4 kg   Physical Exam  General: patient in NAD  HEENT: AT/NC  Resp: CTA B/L  CVS: normal s1 and s2  Abdomen: post op  Skin: warm, dry and intact  Neuro: AAO x3  Psych: cooperative     ROS, PMH, FH/SH, surgical history and allergies have been reviewed.   Relevant Results  Results from last 7 days   Lab Units 06/16/25  1151 06/16/25  0721 06/16/25  0531 06/16/25  0332 06/16/25  0001 06/15/25  1543 06/15/25  0849 06/15/25  0521 06/14/25  0749 06/14/25  0516 06/13/25  0744 06/13/25  0527 06/12/25  0730 06/12/25  0516   POCT GLUCOSE mg/dL 219* 142*  --  169* 173* 229*   < >  --    < >  --    < >  --    < >  --    GLUCOSE mg/dL  --   --  152*  --   --   --   --  196*  --  205*  --  192*  --  183*    < > = values in this interval not displayed.     Results from last 7 days   Lab Units 06/16/25  0531 06/13/25  0527 06/12/25  0516 06/10/25  1715 06/10/25  0926   HEMOGLOBIN A1C %  --   --  5.6  --   --    SODIUM mmol/L 138   < > 138   < > 140   POTASSIUM mmol/L 3.8   < > 3.8   < > 4.0   CHLORIDE mmol/L 104   < > 106   < > 109*   CO2 mmol/L 24   < > 20*   < > 16*   BUN mg/dL 75*   < > 66*   < > 58*   CREATININE mg/dL 3.12*   < > 5.99*   < > 8.40*   CALCIUM  mg/dL 8.9   < > 8.5*   < > 9.4   ALBUMIN g/dL 3.2*   < > 3.4   < > 4.1   PROTEIN TOTAL g/dL  --   --   --   --  7.7   BILIRUBIN TOTAL mg/dL  --   --   --   --  0.6   ALK PHOS U/L  --   --   --   --  76   ALT U/L  --   --   --   --  13   AST U/L  --   --   --   --  22   GLUCOSE mg/dL 152*   < > 183*   < > 117*    < > = values in this interval not displayed.     Assessment & Plan  Kidney transplant candidate    PONV (postoperative nausea and vomiting)    ESRD (end stage renal disease) (Multi)    Luis Panda is a 60 y.o. 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  Patient is currently receiving glucocorticoid for immunosuppression resulting in hyperglycemia.  Endocrinology service is consulted for evaluation of hyperglycemia while on glucocorticoids.     Diabetes History  Type of diabetes: prediabetes since atleast 2021  Last A1c: 5.9 (2/2025)  Follows with: PCP  Home regimen: Glipizide 5 mg daily, decreased dose to 2.5 mg (2/2025) due to lower BG readings on 5 mg dose, no hx of insulin use  Hypoglycemia:  reports fasting BG of 80-90s on glipizide  LDL: 142(1/2024), on simvastatin 20 mg daily     Steroids:   IV Solu-Medrol 500 mg daily since 6/10- 6/12)  IV Solu-Medrol 250 mg (6/13)  IV Solu-Medrol 125 mg (6/14)  Tab Prednisone 20 mg (starting 6/15)     Nutrition: 75g CHO per meal     #Hyperglycemia in setting of prediabetes, current use of steroids and stressful state.  Discharge recommendations:   Insulin NPH to 12 units SQ to be given at time of Tab prednisone 20 mg administration.    Continue Insulin lispro sliding scale #1:50 above BG of 200 with meals  Stop home tablet glipizide at discharge  Patient to follow-up with pharmacy platinum plan (orders placed)  Will schedule follow-up with endocrine [facility] on 7/10/25. Patient will require insulin regimen titration and taper based on steroid taper as an outpatient.  Patient will  receive CGM at discharge, Accuchecks - TIDAC   Patient and family educated about symptoms of hypoglycemia and management  Primary team to order insulin pens, needles, glucometer supplies and CGM sensors at discharge      Recommendations communicated to primary team. Please reach out incase you have any questions or concerns.    The patient was discussed with attending Dr. Wili Baca MD  Endocrinology fellow       Patient will  Goal -180  Hypoglycemia bundle  Will continue to follow and titrate insulin accordingly  Please let us know incase you are changing the patient's diet or glucocorticoids dose as that will alter the regimen above      Discharge planning:   [x] patient may expect to discharge home on MDI, final plan TBD by titration  [x] patient has been seen by diabetes educator, Johanna Ernst.   [x] Primary team to order insulin pen, pen needles at discharge.  Patient has glucometer testing strips and lancets at home  [x] Will enroll pt in Pending sale to [facility] and with transplant PA team for follow-up within 2-4 weeks of discharge  [x] follow up with PCP in 1-2 wks      Recommendations communicated to primary team. Please reach out incase you have any questions or concerns.    The patient was seen and discussed with attending Dr. Rosy Baca MD  Endocrinology fellow

## 2025-06-16 NOTE — CARE PLAN
The patient's goals for the shift include      The clinical goals for the shift include patient will remain safe and vital signs stable.      Problem: Pain - Adult  Goal: Verbalizes/displays adequate comfort level or baseline comfort level  Outcome: Progressing     Problem: Discharge Planning  Goal: Discharge to home or other facility with appropriate resources  Outcome: Progressing     Problem: Chronic Conditions and Co-morbidities  Goal: Patient's chronic conditions and co-morbidity symptoms are monitored and maintained or improved  Outcome: Progressing     Problem: Nutrition  Goal: Nutrient intake appropriate for maintaining nutritional needs  Outcome: Progressing     Problem: Pain  Goal: Takes deep breaths with improved pain control throughout the shift  Outcome: Progressing  Goal: Turns in bed with improved pain control throughout the shift  Outcome: Progressing  Goal: Walks with improved pain control throughout the shift  Outcome: Progressing  Goal: Performs ADL's with improved pain control throughout shift  Outcome: Progressing  Goal: Participates in PT with improved pain control throughout the shift  Outcome: Progressing     Problem: Fall/Injury  Goal: Verbalize understanding of personal risk factors for fall in the hospital  Outcome: Progressing  Goal: Verbalize understanding of risk factor reduction measures to prevent injury from fall in the home  Outcome: Progressing  Goal: Use assistive devices by end of the shift  Outcome: Progressing  Goal: Pace activities to prevent fatigue by end of the shift  Outcome: Progressing

## 2025-06-16 NOTE — LETTER
Patient Name: Luis Panda  Type of Transplant: [x] Kidney [] Pancreas   YOB: 1965  Date of Transplant: 6/10/2025 (Kidney)    MRN: [id number]  Date of Discharge:       Transplant Team:   Maintaining regular contact with your transplant team is important. Team members will continue to provide medical care, advice, and support for you and your family after transplant.     Transplant Surgeon:Debbie Gallo   Transplant Nephrologist: Jasiel Lombardi   Transplant Coordinator: Nuris Gonzalez, RN, Porsche Bansal RN     How to contact your Transplant Team after discharge:  Monday - Friday 8am - 4:30pm  call the Transplant [facility] Team at [phone]   Outside of office hours, Saturday, Sunday, and holidays for urgent needs  call the Hospital  at [phone] and ask for the On Call [facility] Kid[facility] Transplant Coordinator to be paged    Labs:  Labs need to be drawn on Monday and Thursday each week between 8:00am and 9:00 am (before 9:00am when your medication is due). You may use a lab closer to home if you are not scheduled to be at main campus that day.  Take medication after your labs are drawn  Please go to lab before your appointment if on the same day  PLEASE DO NOT LEAVE THE LAB UNTIL YOU HAVE HAD YOUR LABS DRAWN     What to bring to clinic:  Medication bottles, medication list, and pill box  Health Monitoring sheets  Your binder        Future Appointments   Date Time Provider Department Center   6/20/2025 11:30 AM TXP ABDOMINAL SURGEON [id number]   6/25/2025 10:00 AM TXP ABDOMINAL SURGEON [id number]   7/2/2025  9:30 AM TXP ABDOMINAL SURGEON [id number]   7/7/2025  2:00 PM Jf Friedman MD [facility]   7/9/2025  9:30 AM TXP ABDOMINAL SURGEON [id number]   7/11/2025  8:00 AM Shante Sr RDN, LD [id number]   7/16/2025 10:00 AM TXP ABDOMINAL SURGEON [id number]   11/10/2025 10:30 AM Karina Mendoza PA-C  [id number]        Reminders:  Drink 3-4 liters every day (your  water bottle holds 1L)  Do NOT take tacrolimus prior to your lab draw in the morning  Take medications as directed by transplant team not by directions on pharmacy pill bottles (doses change frequently)  Call the  Specialty Pharmacy for medication refills - or - we can transfer prescriptions to your local pharmacy and you can work with them  Please allow FIVE (5) business days for prescriptions to be completed and sent to the pharmacy    To-Do:   Schedule an appointment with your Primary Care in 1-2 months  Schedule an appointment with your general nephrologist within 6 months  Schedule an appointment with a dermatologist in 6 months    When to call the Transplant Office:   [phone]  Temperature greater than 99.5°F (37.5°C)  Blood pressure greater than 160/90  Weight gain of 3 lbs. or more overnight  Increased swelling  Painful or burning urination  Blood in urine  Decreased urine output  Vomiting or diarrhea  Redness or increased drainage from incision  Any other concern

## 2025-06-16 NOTE — PROGRESS NOTES
TRANSPLANT SURGERY PROGRESS NOTE    Luis Panda underwent transplant surgery on 6/10/2025 (Kidney) and was evaluated on multidisciplinary inpatient rounds.  I specifically evaluated the management of immunosuppression to ensure adequate exposure required to decrease the risk of rejection.  Furthermore, I reviewed potential side effects including tremor, hyperglycemia, leukopenia, infection, and other neurologic changes.  I reviewed and adjusted infectious prophylaxis based on the patients clinical condition and  protocols.     24 EVENTS: no acute events; family at the bedside.     DIAGNOSIS: Kidney replaced by transplant ([facility]) Z94.0    PHYSICAL EXAMINATION:  Last Recorded Vitals  Visit Vitals  /77 (BP Location: Right arm, Patient Position: Lying)   Pulse 60   Temp 35.9 °C (96.6 °F) (Temporal)   Resp 12      Intake/Output last 3 Shifts:    Intake/Output Summary (Last 24 hours) at 6/16/2025 1103  Last data filed at 6/16/2025 0606  Gross per 24 hour   Intake 360 ml   Output 20 ml   Net 340 ml      Vitals:    06/16/25 0606   Weight: 67.4 kg (148 lb 9.4 oz)      Gen: A+OX3; NAD  HEENT: PERRL, sclera anicteric, MMM  Cardiac: RRR  Chest: Normal inspiratory effort  Abdomen: S/NT/ND. Incision C/D/I.  Ext: No LE edema  Drain: serosanguinous    LABS:  Renal Function Panel   Result Value Ref Range    Glucose 192 (H) 74 - 99 mg/dL    Sodium 136 136 - 145 mmol/L    Potassium 3.5 3.5 - 5.3 mmol/L    Chloride 104 98 - 107 mmol/L    Bicarbonate 20 (L) 21 - 32 mmol/L    Anion Gap 16 10 - 20 mmol/L    Urea Nitrogen 80 (H) 6 - 23 mg/dL    Creatinine 5.07 (H) 0.50 - 1.05 mg/dL    eGFR 9 (L) >60 mL/min/1.73m*2    Calcium 9.1 8.6 - 10.6 mg/dL    Phosphorus 5.3 (H) 2.5 - 4.9 mg/dL    Albumin 3.2 (L) 3.4 - 5.0 g/dL   CBC and Auto Differential   Result Value Ref Range    WBC 12.4 (H) 4.4 - 11.3 x10*3/uL    nRBC 0.0 0.0 - 0.0 /100 WBCs    RBC 2.81 (L) 4.00 - 5.20 x10*6/uL    Hemoglobin 8.9 (L) 12.0 - 16.0 g/dL    Hematocrit  26.3 (L) 36.0 - 46.0 %    MCV 94 80 - 100 fL    MCH 31.7 26.0 - 34.0 pg    MCHC 33.8 32.0 - 36.0 g/dL    RDW 15.8 (H) 11.5 - 14.5 %    Platelets 90 (L) 150 - 450 x10*3/uL     ASSESSMENT AND PLAN:    Ms. Panda is a 60 y.o. female who underwent  transplant surgery on 6/10/2025 (Kidney).    DDKT 6/10/25  DCD 73% KDPI - CIT 22 hrs  PRA 0     Kidney allograft function              Creatinine downtrending today              Converted to Simulect induction given thrombocytopenia - completed              Keep close to dry weight and abdominal binder given poor quality fascia   BP good     Immunosuppression              Thymo 1.5 mg/kg one dose  Simulect dose#2 6/15   Cont Tacrolimus 1 mg bid               mg bid (thrombocytopenia)  Solumedrol 500 mg x 3 (1st dose was intra-op), then 250 mg, 125 mg, Pred 20 mg      Thrombocytopenia  Stopped Thymo, reduced MMF, higher steroids  Hold Promacta periop given thrombosis risk - hematology consult appreciated  Not candidate for bactrim, plan atovaquone  No heparin subcutaneous; SCDs/PRAVEEN stockings     Hypertension              Home coreg 25 mg bid; currently holding hydralazine     Wound/drain              Analgesia: schedule tylenol and robaxin    Trial low dose gabapentin for right thigh pain - no weakness              Lidocaine patches              Oxycodone prn     Prophylaxis              Clotrimazole, protonix, atovaquone  CMV prophylaxis; letermovir     Dispo              Target discharge today; Labs Thursday - Will resume platelet medications after next labs              PT/OT today and OOB to chair              Family at bedside, also spoke with grand son on rounds      Case was presented at Multi Disciplinary team rounds   Attending physician, consulting physician, , pharmacist, residents and fellow were present at the meeting.     Ghanshyam You MD

## 2025-06-16 NOTE — PROGRESS NOTES
Occupational Therapy    Evaluation    Patient Name: Luis Panda  MRN: [id number]  Today's Date: 6/16/2025  Room: [address] [place], [place] [address]  Time Calculation  Start Time: 1203  Stop Time: 1226  Time Calculation (min): 23 min    Assessment  IP OT Assessment  OT Assessment: Pt's abilty to complete ADLs currently limited by ABD precautions, pain, and deficits in functional strength, activity tolerance, and dynamic balance. The pt demos the ability to complete the majority of ADL tasks with Mod A  - SBA and performs functional mobility with Min A using FWW. Pt will benefit from continued OT while admitted to increase IND and safety prior to d/c.  Prognosis: Good  Barriers to Discharge Home: No anticipated barriers  Evaluation/Treatment Tolerance: Patient tolerated treatment well  Medical Staff Made Aware: Yes  End of Session Communication: Bedside nurse  End of Session Patient Position: Bed, 3 rail up, Alarm off, not on at start of session, Alarm off, caregiver present  Plan:  Inpatient Plan  Treatment Interventions: ADL retraining, Functional transfer training, Endurance training, Patient/family training, Equipment evaluation/education, Compensatory technique education  OT Frequency: 2 times per week  OT Discharge Recommendations: Low intensity level of continued care  Equipment Recommended upon Discharge: Wheeled walker  OT Recommended Transfer Status: Minimal assist, Assist of 1  OT - OK to Discharge: Yes  OT Assessment  OT Assessment Results: Decreased ADL status, Decreased endurance, Decreased functional mobility, Decreased IADLs  Prognosis: Good  Barriers to Discharge: None  Evaluation/Treatment Tolerance: Patient tolerated treatment well  Medical Staff Made Aware: Yes  Strengths: Attitude of self, Support of Caregivers  Barriers to Participation: Comorbidities    Subjective   Current Problem:  1. Kidney transplant candidate  Referral to [facility]      2. ESRD (end stage renal disease) (Multi)  Case Request  "Operating Room: TRANSPLANT, KIDNEY    Case Request Operating Room: TRANSPLANT, KIDNEY      3. Post-operative pain  acetaminophen (Tylenol) 325 mg tablet      4. Kidney replaced by transplant ([facility])  atovaquone (Mepron) 750 mg/5 mL suspension    clotrimazole (Mycelex) 10 mg elissa    mycophenolate (Cellcept) 250 mg capsule    pantoprazole (ProtoNix) 40 mg EC tablet    predniSONE (Deltasone) 5 mg tablet    tacrolimus (Prograf) 0.5 mg capsule    letermovir (Prevymis) 480 mg tablet    acyclovir (Zovirax) 400 mg tablet    gabapentin (Neurontin) 100 mg capsule    traMADol (Ultram) 50 mg tablet    methocarbamol (Robaxin) 500 mg tablet    insulin NPH, Isophane, (HumuLIN N,NovoLIN N) 100 unit/mL (3 mL) pen    pen needle, diabetic 32 gauge x 5/32\" needle    insulin lispro (HumaLOG) 100 unit/mL pen    Referral to Home Health    Drain/Tube Care    Referral to Clinical Pharmacy    DISCONTINUED: tacrolimus (Prograf) 1 mg capsule    DISCONTINUED: valGANciclovir (Valcyte) 450 mg tablet    DISCONTINUED: insulin glargine (Lantus) 100 unit/mL (3 mL) pen    DISCONTINUED: insulin lispro (HumaLOG) 100 unit/mL pen    CANCELED: Referral to Home Health    CANCELED: Referral to Home Health      5. Constipation due to opioid therapy  polyethylene glycol (Glycolax, Miralax) 17 gram/dose powder    docusate sodium (Colace) 100 mg capsule      6. Impaired mobility and activities of daily living  Wheeled Folding Walker      7. Swelling of right upper extremity  Vascular US upper extremity venous duplex right    Vascular US upper extremity venous duplex right    CANCELED: Vascular US upper extremity venous duplex right    CANCELED: Vascular US upper extremity venous duplex right      8. Steroid-induced hyperglycemia  insulin NPH, Isophane, (HumuLIN N,NovoLIN N) 100 unit/mL (3 mL) pen    insulin lispro (HumaLOG) 100 unit/mL pen    Referral to Clinical Pharmacy    DISCONTINUED: insulin glargine (Lantus) 100 unit/mL (3 mL) pen    DISCONTINUED: " insulin lispro (HumaLOG) 100 unit/mL pen      9. Essential hypertension  hydrALAZINE (Apresoline) 25 mg tablet      10. Type 2 diabetes mellitus without complication, without long-term current use of insulin  Referral to Clinical Pharmacy        General:  Reason for Referral: s/p DDKT  Past Medical History Relevant to Rehab: ESRD 2/2 PCKD  Prior to Session Communication: Bedside nurse  Patient Position Received: Bed, 3 rail up, Alarm off, not on at start of session, Alarm off, caregiver present  Family/Caregiver Present: Yes  Caregiver Feedback: Several family members present and engaged- assist with translation  General Comment: Pt pleasant and agreeable to OT session - family assists with translation throughout   Precautions:  Medical Precautions: Fall precautions  Post-Surgical Precautions: Abdominal surgery precautions  Vital Signs:   Date/Time Vitals Session Patient Position Pulse Resp SpO2 BP MAP (mmHg)    06/16/25 1203 During OT  --  66  --  97 %  --  --           Pain:  Pain Assessment  Pain Assessment: 0-10  0-10 (Numeric) Pain Score: 6  Pain Type: Surgical pain  Pain Location: Abdomen  Pain Interventions: Repositioned  Response to Interventions: Content/relaxed  Lines/Tubes/Drains:  Closed/Suction Drain 1 RLQ 19 Fr. (Active)   Number of days: 5     Objective   Cognition:  Overall Cognitive Status: Within Functional Limits  Orientation Level: Oriented X4   Home Living:  Type of Home: House  Lives With: Significant other (grandson - able to have 24/7 assist)  Home Adaptive Equipment: None  Home Layout: Bed/bath upstairs, 1/2 bath on main level  Home Access: Stairs to enter without rails  Entrance Stairs-Number of Steps: 2  Bathroom Shower/Tub: Tub/shower unit  Bathroom Toilet: Standard  Bathroom Equipment: Shower chair without back  Home Living Comments: Shower stool   Prior Function:  Level of [place]: Independent with ADLs and functional transfers, Independent with homemaking with ambulation  ADL  Assistance: Independent  Homemaking Assistance: Independent  Ambulatory Assistance: Independent  Vocational: Retired  Prior Function Comments: - falls  IADL History:  Homemaking Responsibilities: Yes  Meal Prep Responsibility: Primary  Laundry Responsibility: Primary  Cleaning Responsibility: Primary  Bill Paying/Finance Responsibility: Primary  Shopping Responsibility: Primary  Current License: No  Mode of Transportation: Family  Occupation: Retired  ADL:  Eating Assistance: Independent (Anticipated)  Grooming Assistance: Stand by  Grooming Deficit: Supervision/safety, Verbal cueing  Bathing Assistance: Moderate (Anticipated)  UE Dressing Assistance: Minimal  LE Dressing Assistance: Moderate (Anticipated)  Toileting Assistance with Device: Moderate  ADL Comments: Limited by pain - family assist pt througout eval  Activity Tolerance:  Endurance: Tolerates 10 - 20 min exercise with multiple rests  Balance:  Dynamic Standing Balance  Dynamic Standing-Balance Support: Bilateral upper extremity supported  Dynamic Standing-Level of Assistance: Contact guard  Dynamic Standing-Comments: FWW  Static Sitting Balance  Static Sitting-Balance Support: No upper extremity supported, Feet supported  Static Sitting-Level of Assistance: Distant supervision  Static Sitting-Comment/Number of Minutes: EOB  Static Standing Balance  Static Standing-Balance Support: Bilateral upper extremity supported  Static Standing-Level of Assistance: Close supervision  Static Standing-Comment/Number of Minutes: FWW  Bed Mobility/Transfers: Bed Mobility  Bed Mobility: Yes  Bed Mobility 1  Bed Mobility 1: Supine to sitting, Sitting to supine  Level of Assistance 1: Minimal verbal cues, Minimum assistance  Bed Mobility Comments 1: Cues for log roll, use of bed rail   and Transfers  Transfer: Yes  Transfer 1  Transfer From 1: Sit to, Stand to  Transfer to 1: Stand, Sit  Technique 1: Sit to stand, Stand to sit  Transfer Device 1: Walker  Transfer Level of  Assistance 1: Minimum assistance  Trials/Comments 1: Min A for lifting to stand, cues for technique  Transfers 2  Transfer From 2: Bed to  Transfer to 2: Toilet  Technique 2:  (Ambulating)  Transfer Device 2: Walker  Transfer Level of Assistance 2: Contact guard  Trials/Comments 2: Cues for safety  IADL's:   Homemaking Responsibilities: Yes  Meal Prep Responsibility: Primary  Laundry Responsibility: Primary  Cleaning Responsibility: Primary  Bill Paying/Finance Responsibility: Primary  Shopping Responsibility: Primary  Current License: No  Mode of Transportation: Family  Occupation: Retired  Vision: Vision - Basic Assessment  Current Vision: No visual deficits   and    Sensation:  Light Touch: No apparent deficits  Strength:  Strength Comments: B UEs appear WFL  Perception:  Inattention/Neglect: Appears intact  Coordination:  Movements are Fluid and Coordinated: Yes   Hand Function:  Hand Function  Gross Grasp: Functional  Coordination: Functional  Extremities: RUE   RUE : Within Functional Limits, LUE   LUE: Within Functional Limits,  , and      Outcome Measures: [facility] Daily Activity  Putting on and taking off regular lower body clothing: A lot  Bathing (including washing, rinsing, drying): A lot  Putting on and taking off regular upper body clothing: A little  Toileting, which includes using toilet, bedpan or urinal: A lot  Taking care of personal grooming such as brushing teeth: A little  Eating Meals: None  Daily Activity - Total Score: 16         ,     OT Adult Other Outcome Measures  4AT: 4 AT -    Education Documentation  Body Mechanics, taught by Loco Saleem OT at 6/16/2025  1:40 PM.  Learner: Patient  Readiness: Acceptance  Method: Explanation, Demonstration  Response: Verbalizes Understanding  Comment: ADLs and safety    Precautions, taught by Loco Saleem OT at 6/16/2025  1:40 PM.  Learner: Patient  Readiness: Acceptance  Method: Explanation, Demonstration  Response: Verbalizes Understanding  Comment:  ADLs and safety    ADL Training, taught by Loco Saleem OT at 6/16/2025  1:40 PM.  Learner: Patient  Readiness: Acceptance  Method: Explanation, Demonstration  Response: Verbalizes Understanding  Comment: ADLs and safety    Education Comments  No comments found.    Goals:     Encounter Problems       Encounter Problems (Active)       ADLs       Patient with complete lower body dressing with modified independent level of assistance donning and doffing all LE clothes  with PRN adaptive equipment while edge of bed  and standing (Progressing)       Start:  06/16/25    Expected End:  07/07/25            Patient will complete daily grooming tasks brushing teeth and washing face/hair with modified independent level of assistance and PRN adaptive equipment while standing. (Progressing)       Start:  06/16/25    Expected End:  07/07/25            Patient will complete toileting including hygiene clothing management/hygiene with modified independent level of assistance and raised toilet seat and grab bars. (Progressing)       Start:  06/16/25    Expected End:  07/07/25               BALANCE       Pt will maintain dynamic standing balance during ADL task with modified independent level of assistance in order to demonstrate decreased risk of falling and improved postural control. (Progressing)       Start:  06/16/25    Expected End:  07/07/25               MOBILITY       Patient will perform Functional mobility mod  Household distances/Community Distances with modified independent level of assistance and least restrictive device in order to improve safety and functional mobility. (Progressing)       Start:  06/16/25    Expected End:  07/07/25               TRANSFERS       Patient will perform bed mobility modified independent level of assistance and bed rails in order to improve safety and independence with mobility (Progressing)       Start:  06/16/25    Expected End:  07/07/25 06/16/25 at 1:41 PM   Loco  Harper, OT   Rehab Office: [phone]

## 2025-06-16 NOTE — HH CARE COORDINATION
Home Care received a Referral for Nursing, Physical Therapy, and Occupational Therapy. We have processed the referral for a Start of Care on 06/17-06/18.     If you have any questions or concerns, please feel free to contact us at [phone]. Follow the prompts, enter your five digit zip code, and you will be directed to your care team on [facility].

## 2025-06-16 NOTE — PROGRESS NOTES
Luis Panda is a 60 y.o. female on day 6 of admission presenting with Kidney transplant candidate.      Transitional Care Coordination Progress Note:  Patient discussed during interdisciplinary rounds.      Plan per Medical/Surgical team:    Home Care choice for home going needs, [facility].  Pt Needs: PT/OT.  [facility] ordered for [facility].    6/16: [facility] aware. SOC 24-48 hours post dc.          Discharge disposition: [facility]@H. [facility], [facility]     Potential Barriers: None     ADOD:  6/16     This TCC will continue to follow for home going needs and safe DC plan.    THONY OWENS

## 2025-06-16 NOTE — LETTER
Patient Name: Luis Panda  Type of Transplant: [x] Kidney [] Pancreas   YOB: 1965  Date of Transplant: 6/10/2025 (Kidney)    MRN: [id number]  Date of Discharge:  6/16/2025     Transplant Team:   Maintaining regular contact with your transplant team is important. Team members will continue to provide medical care, advice, and support for you and your family after transplant.     Transplant Surgeon:Debbie Gallo   Transplant Nephrologist: Jasiel Lombardi   Transplant Coordinator: Nuris Gonzalez, RN, Porsche Bansal, RN     How to contact your Transplant Team after discharge:  Monday - Friday 8am - 4:30pm  call the Transplant [place] [facility] Team at [phone]   Outside of office hours, Saturday, Sunday, and holidays for urgent needs  call the Hospital  at [phone] and ask for the On Call [facility] Transplant Coordinator to be paged    Labs:  Labs need to be drawn on Monday and Thursday each week between 8:00am and 9:00 am (before 9:00am when your medication is due). You may use a lab closer to home if you are not scheduled to be at main campus that day.  Take medication after your labs are drawn  Please go to lab before your appointment if on the same day  PLEASE DO NOT LEAVE THE LAB UNTIL YOU HAVE HAD YOUR LABS DRAWN     What to bring to clinic:  Medication bottles, medication list, and pill box  Health Monitoring sheets  Your binder        Future Appointments   Date Time Provider Department Center   6/25/2025 10:00 AM TXP ABDOMINAL SURGEON [id number]   7/2/2025  9:30 AM TXP ABDOMINAL SURGEON [id number]   7/7/2025  2:00 PM MD MARY Reeves   7/9/2025  9:30 AM TXP ABDOMINAL SURGEON [id number]   7/11/2025  8:00 AM Shante Sr RDN, LD [id number]   7/16/2025 10:00 AM TXP ABDOMINAL SURGEON [id number]   11/10/2025 10:30 AM Karina Mendoza PA-C [id number]            Reminders:  Drink 3-4 liters every  day (your  water bottle holds 1L)  Do NOT take tacrolimus prior to your lab draw in the morning  Take medications as directed by transplant team not by directions on pharmacy pill bottles (doses change frequently)  Call the  Specialty Pharmacy for medication refills - or - we can transfer prescriptions to your local pharmacy and you can work with them  Please allow FIVE (5) business days for prescriptions to be completed and sent to the pharmacy        To-Do:   Schedule an appointment with your Primary Care in 1-2 months  Schedule an appointment with your general nephrologist within 6 months  Schedule an appointment with a dermatologist in 6 months                When to call the Transplant Office:   [phone]  Temperature greater than 99.5°F (37.5°C)  Blood pressure greater than 160/90  Weight gain of 3 lbs. or more overnight  Increased swelling  Painful or burning urination  Blood in urine  Decreased urine output  Vomiting or diarrhea  Redness or increased drainage from incision  Any other concern

## 2025-06-16 NOTE — LETTER
Patient Name:  Luis Panda  Type of Transplant: [x] Kidney [] Pancreas   YOB: 1965  Date of Transplant: 6/10/2025 (Kidney)    MRN:   [id number]  Date of Discharge:   6/16/2025     Transplant Team:   Maintaining regular contact with your transplant team is important. Team members will continue to provide medical care, advice, and support for you and your family after transplant.     Transplant Surgeon:Debbie Gallo   Transplant Nephrologist: Jasiel Lombardi   Transplant Coordinator: Nuris Gonzalez, RN, Porsche Bansal, RN     How to contact your Transplant Team after discharge:  Monday - Friday 8am - 4:30pm  call the [facility] [place] [facility] at [phone]   Outside of office hours, Saturday, Sunday, and holidays for urgent needs  call the Hospital  at [phone] and ask for the On Call [facility] Transplant Coordinator to be paged    Labs:  While on dialysis, labs will be drawn by the unit prior to each dialysis session  Once off dialysis, labs need to be drawn on Monday and Thursday each week between 8:00am and 9:00 am (before 9:00am when your medication is due). You may use a lab closer to home if you are not scheduled to be at main campus that day.  Take medication after your labs are drawn  Please go to lab before your appointment if on the same day  PLEASE DO NOT LEAVE THE LAB UNTIL YOU HAVE HAD YOUR LABS DRAWN     What to bring to clinic:  Medication bottles, medication list, and pill box  Health Monitoring sheets  Your binder        Future Appointments   Date Time Provider Department Center   6/20/2025 11:30 AM TXP ABDOMINAL SURGEON [id number]   6/25/2025 10:00 AM TXP ABDOMINAL SURGEON [id number]   7/2/2025  9:30 AM TXP ABDOMINAL SURGEON [id number]   7/7/2025  2:00 PM Jf Friedman MD [facility]   7/9/2025  9:30 AM TXP ABDOMINAL SURGEON [id number]   7/11/2025  8:00 AM Shante Sr RDN, AMY [id number]   7/16/2025 10:00 AM TXP ABDOMINAL SURGEON [id number]   11/10/2025 10:30 AM Karina Mendoza PA-C [id number]          Reminders:  Drink 2 liters every day (your  water bottle holds 1L)  Do NOT take tacrolimus prior to your lab draw in the morning  Take medications as directed by transplant team not by directions on pharmacy pill bottles (doses change frequently)  Call the  Specialty Pharmacy for medication refills  Please allow FIVE (5) business days for prescriptions to be completed and sent to the pharmacy      To-Do:   Schedule an appointment with your Primary Care in 1-2 months  Schedule an appointment with your general nephrologist within 6 months  Schedule an appointment with a dermatologist in 6 months                  When to call the Transplant Office:   [phone]  Temperature greater than 99.5°F (37.5°C)  Blood pressure greater than 160/90  Weight gain of 3 lbs. or more overnight  Increased swelling  Painful or burning urination  Blood in urine  Decreased urine output  Vomiting or diarrhea  Redness or increased drainage from incision  Any other concern

## 2025-06-17 ENCOUNTER — TELEPHONE (OUTPATIENT)
Dept: HOME HEALTH SERVICES | Facility: HOME HEALTH | Age: 60
End: 2025-06-17
Payer: MEDICAID

## 2025-06-17 ENCOUNTER — PATIENT OUTREACH (OUTPATIENT)
Dept: CARE COORDINATION | Age: 60
End: 2025-06-17
Payer: MEDICAID

## 2025-06-17 DIAGNOSIS — Z94.0 KIDNEY REPLACED BY TRANSPLANT (HHS-HCC): ICD-10-CM

## 2025-06-17 LAB — STFR SERPL-MCNC: 2 MG/L (ref 1.9–4.4)

## 2025-06-17 NOTE — TELEPHONE ENCOUNTER
FYI patient was referred to [facility] for therapy upon hospital discharge however patient and family declined services when contacted for scheduling. Please ensure patient has a hospital follow up scheduled as they will not be monitored by home care.

## 2025-06-18 ENCOUNTER — PATIENT OUTREACH (OUTPATIENT)
Dept: CARE COORDINATION | Age: 60
End: 2025-06-18
Payer: MEDICAID

## 2025-06-18 NOTE — PROGRESS NOTES
Called pt, spoke to son. Says    Temp:  98.4  Intake 1500 ml  Output 1200ml  Incision appearance: stitches   odor none , moderate    drsg change  Recent labs  Stools small amounts, no diarrhea  Appetite light appetite  Questions/Issues  Education needed   Next clinic visit   6/20/2025  Any equipment issues to be delivered 6/19/2025  Will continue to monitor

## 2025-06-19 ENCOUNTER — PATIENT OUTREACH (OUTPATIENT)
Dept: CARE COORDINATION | Age: 60
End: 2025-06-19
Payer: MEDICAID

## 2025-06-19 DIAGNOSIS — E11.22 TYPE 2 DIABETES MELLITUS WITH CHRONIC KIDNEY DISEASE, WITH LONG-TERM CURRENT USE OF INSULIN, UNSPECIFIED CKD STAGE (MULTI): Primary | ICD-10-CM

## 2025-06-19 DIAGNOSIS — Z94.0 KIDNEY TRANSPLANT RECIPIENT (HHS-HCC): ICD-10-CM

## 2025-06-19 DIAGNOSIS — R73.9 STEROID-INDUCED HYPERGLYCEMIA: ICD-10-CM

## 2025-06-19 DIAGNOSIS — T38.0X5A STEROID-INDUCED HYPERGLYCEMIA: ICD-10-CM

## 2025-06-19 DIAGNOSIS — Z79.4 TYPE 2 DIABETES MELLITUS WITH CHRONIC KIDNEY DISEASE, WITH LONG-TERM CURRENT USE OF INSULIN, UNSPECIFIED CKD STAGE (MULTI): Primary | ICD-10-CM

## 2025-06-19 NOTE — PROGRESS NOTES
Called pt, spoke to son.   Says  before lunch  Temp:  97.7  Intake 1500 ml  Output 800ml  Incision appearance: stitches   odor none , 20ml drainage in tube   Recent labs  Stools none, taking Miralax  Appetite good appetite  Questions/Issues  Education needed   Next clinic visit   6/20/2025  Any equipment issues: delivered today, workiong well.   Will continue to monitor

## 2025-06-20 ENCOUNTER — PATIENT OUTREACH (OUTPATIENT)
Dept: CARE COORDINATION | Age: 60
End: 2025-06-20

## 2025-06-20 ENCOUNTER — OFFICE VISIT (OUTPATIENT)
Facility: HOSPITAL | Age: 60
End: 2025-06-20
Payer: MEDICAID

## 2025-06-20 VITALS
HEART RATE: 62 BPM | OXYGEN SATURATION: 96 % | TEMPERATURE: 97.4 F | SYSTOLIC BLOOD PRESSURE: 121 MMHG | BODY MASS INDEX: 30.25 KG/M2 | DIASTOLIC BLOOD PRESSURE: 71 MMHG | WEIGHT: 154.9 LBS

## 2025-06-20 DIAGNOSIS — Z94.0 KIDNEY REPLACED BY TRANSPLANT (HHS-HCC): ICD-10-CM

## 2025-06-20 DIAGNOSIS — Z79.899 IMMUNOSUPPRESSIVE MANAGEMENT ENCOUNTER FOLLOWING KIDNEY TRANSPLANT: Primary | ICD-10-CM

## 2025-06-20 DIAGNOSIS — N18.5 TYPE 2 DIABETES MELLITUS WITH STAGE 5 CHRONIC KIDNEY DISEASE NOT ON CHRONIC DIALYSIS, WITH LONG-TERM CURRENT USE OF INSULIN (MULTI): ICD-10-CM

## 2025-06-20 DIAGNOSIS — C91.10 CLL (CHRONIC LYMPHOCYTIC LEUKEMIA) (MULTI): ICD-10-CM

## 2025-06-20 DIAGNOSIS — E11.22 TYPE 2 DIABETES MELLITUS WITH STAGE 5 CHRONIC KIDNEY DISEASE NOT ON CHRONIC DIALYSIS, WITH LONG-TERM CURRENT USE OF INSULIN (MULTI): ICD-10-CM

## 2025-06-20 DIAGNOSIS — Z79.4 TYPE 2 DIABETES MELLITUS WITH STAGE 5 CHRONIC KIDNEY DISEASE NOT ON CHRONIC DIALYSIS, WITH LONG-TERM CURRENT USE OF INSULIN (MULTI): ICD-10-CM

## 2025-06-20 DIAGNOSIS — I10 ESSENTIAL HYPERTENSION: ICD-10-CM

## 2025-06-20 DIAGNOSIS — Z94.0 IMMUNOSUPPRESSIVE MANAGEMENT ENCOUNTER FOLLOWING KIDNEY TRANSPLANT: Primary | ICD-10-CM

## 2025-06-20 PROBLEM — N18.9 CKD (CHRONIC KIDNEY DISEASE): Status: RESOLVED | Noted: 2017-03-31 | Resolved: 2025-06-20

## 2025-06-20 PROBLEM — N18.6 ESRD (END STAGE RENAL DISEASE) (MULTI): Status: RESOLVED | Noted: 2025-06-10 | Resolved: 2025-06-20

## 2025-06-20 PROBLEM — Z76.82 KIDNEY TRANSPLANT CANDIDATE: Status: RESOLVED | Noted: 2025-06-10 | Resolved: 2025-06-20

## 2025-06-20 PROCEDURE — 99214 OFFICE O/P EST MOD 30 MIN: CPT | Mod: 24

## 2025-06-20 RX ORDER — HYDRALAZINE HYDROCHLORIDE 25 MG/1
25 TABLET, FILM COATED ORAL 3 TIMES DAILY
Qty: 90 TABLET | Refills: 11 | Status: SHIPPED | OUTPATIENT
Start: 2025-06-20 | End: 2026-06-20

## 2025-06-20 RX ORDER — MYCOPHENOLATE MOFETIL 250 MG/1
1000 CAPSULE ORAL 2 TIMES DAILY
Qty: 240 CAPSULE | Refills: 11 | Status: SHIPPED | OUTPATIENT
Start: 2025-06-20 | End: 2025-06-20 | Stop reason: SDUPTHER

## 2025-06-20 RX ORDER — PREDNISONE 5 MG/1
20 TABLET ORAL DAILY
Qty: 120 TABLET | Refills: 11 | Status: SHIPPED | OUTPATIENT
Start: 2025-06-20 | End: 2026-06-20

## 2025-06-20 RX ORDER — ACYCLOVIR 400 MG/1
400 TABLET ORAL 2 TIMES DAILY
Qty: 60 TABLET | Refills: 2 | Status: SHIPPED | OUTPATIENT
Start: 2025-06-20 | End: 2025-09-18

## 2025-06-20 RX ORDER — ATOVAQUONE 750 MG/5ML
1500 SUSPENSION ORAL DAILY
Qty: 300 ML | Refills: 5 | Status: SHIPPED | OUTPATIENT
Start: 2025-06-20 | End: 2025-12-17

## 2025-06-20 RX ORDER — MYCOPHENOLATE MOFETIL 250 MG/1
750 CAPSULE ORAL 2 TIMES DAILY
Qty: 180 CAPSULE | Refills: 11 | Status: SHIPPED | OUTPATIENT
Start: 2025-06-20 | End: 2026-06-20

## 2025-06-20 RX ORDER — CLOTRIMAZOLE 10 MG/1
10 LOZENGE ORAL
Qty: 90 TROCHE | Refills: 2 | Status: SHIPPED | OUTPATIENT
Start: 2025-06-20 | End: 2025-09-18

## 2025-06-20 RX ORDER — GABAPENTIN 100 MG/1
100 CAPSULE ORAL DAILY
Qty: 30 CAPSULE | Refills: 11 | Status: SHIPPED | OUTPATIENT
Start: 2025-06-20 | End: 2026-06-20

## 2025-06-20 RX ORDER — TACROLIMUS 0.5 MG/1
0.5 CAPSULE ORAL 2 TIMES DAILY
Qty: 60 CAPSULE | Refills: 11 | Status: SHIPPED | OUTPATIENT
Start: 2025-06-20 | End: 2026-06-20

## 2025-06-20 ASSESSMENT — PAIN SCALES - GENERAL: PAINLEVEL_OUTOF10: 0-NO PAIN

## 2025-06-20 NOTE — PROGRESS NOTES
6/20 Called pt, spoke to son.   Says FBS 78 before breakfast,158 before lunch  Temp:  98.6  Intake 800 ml, instructed to drink more  Output 600ml  Incision appearance: stitches   odor none, drain out    Recent labs  Stools normal, taking Miralax  Appetite good  Questions/Issues pain is a 5   Education needed, increase hydration   Next clinic visit   6/25/2025  Any equipment issues: none

## 2025-06-20 NOTE — PATIENT INSTRUCTIONS
Medication Changes:  No changes today    Plan:  Labs Mondays and Thursdays  Next clinic appt Wednesday 6/25/25 at 10:00am

## 2025-06-20 NOTE — PROGRESS NOTES
TRANSPLANT SURGERY FOLLOW UP    Reason for visit:    Luis Panda underwent transplant surgery,  6/10/2025 (Kidney), and returns to clinic for follow up evaluation focusing on their current immunosuppression. Specifically, Luis Panda's regiment was evaluated to ensure adequate levels to prevent life threatening or organ function impairing rejection while not increasing risk of adverse effects including malignancy, infection, bone health, or accelerated cardiovascular disease.  I reviewed common side effects including tremor, hair loss, GI toxicity, and agitation.  The patient reported that they were experiencing: right leg swelling and discomfort.    The long-term management of maintenance immunosuppression in organ transplant recipients remains complex given differences in clinical characteristics, comorbid conditions, and prior rejection episodes.  These factors were evaluated at this visit and used in formulating this plan of care. Immunosuppression following the transplant is medically necessary to closely monitor and to reduce the risk of post-operative complications distinct from the post operative management of the transplant surgical procedure.     Interval history  Doing well after transplant  Cr slowly improving:  Lab Results   Component Value Date    CREATININE 3.12 (H) 06/16/2025       Problem List[1]    Medications:      Current Outpatient Medications   Medication Instructions    acetaminophen (TYLENOL) 650 mg, oral, Every 6 hours PRN    acyclovir (ZOVIRAX) 400 mg, oral, 2 times daily    [Paused] aspirin 81 mg EC tablet Take 1 tablet (81 mg) by mouth once daily.    atovaquone (MEPRON) 1,500 mg, oral, Daily    carvedilol (COREG) 25 mg, oral, 2 times daily (morning and late afternoon)    clotrimazole (MYCELEX) 10 mg, Mouth/Throat, 3 times daily (morning, midday, late afternoon)    docusate sodium (COLACE) 100 mg, oral, 2 times daily PRN    gabapentin (NEURONTIN) 100 mg, oral, Daily    HumuLIN  "N NPH Insulin KwikPen 12 Units, subcutaneous, Every 24 hours scheduled, Take as directed per insulin instructions.    hydrALAZINE (APRESOLINE) 25 mg, oral, 3 times daily    insulin lispro (HUMALOG) 0-10 Units, subcutaneous, 3 times daily before meals, Take as directed per insulin instructions.  Hypoglycemia protocol Call LIP unit(s) if Blood Glucose is between 0 - 70 mg/d   0 unit(s) if Blood glucose is between    1 unit(s) if Blood glucose is between 201-250   2 unit(s) if Blood glucose is between 251-300   3 unit(s) if Bloodglucose is between 301-350   4 unit(s) if Blood glucose is between 351-400   If blood glucose is greater than 400 mg/dL, give max insulin per sliding scale AND then contact provider.    letermovir (PREVYMIS) 480 mg, oral, Daily    methocarbamol (ROBAXIN) 500 mg, oral, Every 6 hours PRN    mycophenolate (CELLCEPT) 1,000 mg, oral, 2 times daily    pantoprazole (PROTONIX) 40 mg, oral, Daily before breakfast, Do not crush, chew, or split.    pen needle, diabetic 32 gauge x 5/32\" needle Use 1 each 5 times a day.    polyethylene glycol (GLYCOLAX, MIRALAX) 17 g, oral, Daily    predniSONE (DELTASONE) 20 mg, oral, Daily    [Paused] Promacta 12.5 mg, oral, Daily before breakfast, Take on an empty stomach, 1 hour before or 2 hours afer a meal.    simvastatin (Zocor) 20 mg tablet 1 tablet, oral, Nightly    tacrolimus (PROGRAF) 0.5 mg, oral, 2 times daily    traMADol (ULTRAM) 50 mg, oral, Every 6 hours PRN    traZODone (Desyrel) 50 mg tablet 1 tablet, oral, Nightly    TRUEplus Lancets 33 gauge misc        Physical Exam  Vitals:    06/20/25 1139   BP: 121/71   Pulse: 62   Temp: 36.3 °C (97.4 °F)   SpO2: 96%          Physical Exam  Constitutional:       Appearance: She is obese.   HENT:      Head: Normocephalic.   Cardiovascular:      Rate and Rhythm: Normal rate.      Pulses: Normal pulses.   Pulmonary:      Effort: Pulmonary effort is normal.   Abdominal:      Palpations: Abdomen is soft. There is no " mass.   Musculoskeletal:         General: Swelling present.      Right lower leg: Edema present.   Skin:     General: Skin is warm.      Capillary Refill: Capillary refill takes less than 2 seconds.   Neurological:      General: No focal deficit present.      Mental Status: She is alert.   Psychiatric:         Mood and Affect: Mood normal.               ALLERGY:  Allergies[2]    LABS:  No results found for this or any previous visit (from the past 24 hours).   Lab Results   Component Value Date    ALT 13 06/10/2025    AST 22 06/10/2025    ALKPHOS 76 06/10/2025    BILITOT 0.6 06/10/2025     Lab Results   Component Value Date    WBC 6.0 06/16/2025    HGB 8.9 (L) 06/16/2025    HCT 26.2 (L) 06/16/2025    MCV 93 06/16/2025    PLT 70 (L) 06/16/2025     Lab Results   Component Value Date    GLUCOSE 152 (H) 06/16/2025    CALCIUM 8.9 06/16/2025     06/16/2025    K 3.8 06/16/2025    CO2 24 06/16/2025     06/16/2025    BUN 75 (H) 06/16/2025    CREATININE 3.12 (H) 06/16/2025                 ASSESSMENT AND PLAN:    Ms. Panda is a 60 y.o. female who underwent  transplant surgery on 6/10/2025 (Kidney).    1. Immunosuppression reviewed and adjusted       Tacrolimus: Yes - 0.5 po BID Level was 12.6 prior to decreasing. Level pending today      Mycophenolate: Yes - 750 mg po BID      Prednisone: Yes - 20 mg po BID      Belatacept: No     2. Prophylaxis adherence protocol to prevent immunosuppression related infection      Letermovir: Yes - history of ITP, + acylovir     Transplant Prophylactics: Mepron    4. Hypertension         Blood Pressures         6/20/2025  1139             BP: 121/71              Current antihypertensives were evaluated        5. Wound/ULI      [place] ready for removal: No      ULI:  Yes - removed     6.  GI prophylaxis        On PPI     7.  Follow up:       Labs  2 times per week       RTC: 1 week(s)    I spent a total of 35 min providing care for this patient including record review,  examination, face to face counseling, and documentation.     Geovanny Santiago MD    Transplant Surgery Attending         [1]   Patient Active Problem List  Diagnosis    Age-related nuclear cataract of both eyes    Anemia    Arteriovenous fistula    Cerebral aneurysm, nonruptured (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)   [2]   Allergies  Allergen Reactions    Amlodipine Other     LEG SWELLING    Oxycodone Other     Dizziness, weakness

## 2025-06-23 ENCOUNTER — PATIENT OUTREACH (OUTPATIENT)
Dept: CARE COORDINATION | Age: 60
End: 2025-06-23

## 2025-06-23 ENCOUNTER — APPOINTMENT (OUTPATIENT)
Dept: LAB | Facility: HOSPITAL | Age: 60
End: 2025-06-23
Payer: MEDICAID

## 2025-06-23 LAB
ALBUMIN SERPL BCP-MCNC: 3.2 G/DL (ref 3.4–5)
ANION GAP SERPL CALC-SCNC: 9 MMOL/L (ref 10–20)
BUN SERPL-MCNC: 27 MG/DL (ref 6–23)
CALCIUM SERPL-MCNC: 8.6 MG/DL (ref 8.6–10.3)
CHLORIDE SERPL-SCNC: 110 MMOL/L (ref 98–107)
CO2 SERPL-SCNC: 23 MMOL/L (ref 21–32)
CREAT SERPL-MCNC: 1.8 MG/DL (ref 0.5–1.05)
EGFRCR SERPLBLD CKD-EPI 2021: 32 ML/MIN/1.73M*2
ERYTHROCYTE [DISTWIDTH] IN BLOOD BY AUTOMATED COUNT: 17.2 % (ref 11.5–14.5)
GLUCOSE SERPL-MCNC: 103 MG/DL (ref 74–99)
HCT VFR BLD AUTO: 30.7 % (ref 36–46)
HGB BLD-MCNC: 9.9 G/DL (ref 12–16)
MAGNESIUM SERPL-MCNC: 1.62 MG/DL (ref 1.6–2.4)
MCH RBC QN AUTO: 32.5 PG (ref 26–34)
MCHC RBC AUTO-ENTMCNC: 32.2 G/DL (ref 32–36)
MCV RBC AUTO: 101 FL (ref 80–100)
NRBC BLD-RTO: 0 /100 WBCS (ref 0–0)
PHOSPHATE SERPL-MCNC: 2.1 MG/DL (ref 2.5–4.9)
PLATELET # BLD AUTO: 73 X10*3/UL (ref 150–450)
POTASSIUM SERPL-SCNC: 4.1 MMOL/L (ref 3.5–5.3)
RBC # BLD AUTO: 3.05 X10*6/UL (ref 4–5.2)
SODIUM SERPL-SCNC: 138 MMOL/L (ref 136–145)
TACROLIMUS BLD-MCNC: 6.4 NG/ML
WBC # BLD AUTO: 10.2 X10*3/UL (ref 4.4–11.3)

## 2025-06-23 PROCEDURE — 85027 COMPLETE CBC AUTOMATED: CPT

## 2025-06-23 PROCEDURE — 83735 ASSAY OF MAGNESIUM: CPT

## 2025-06-23 PROCEDURE — 80069 RENAL FUNCTION PANEL: CPT

## 2025-06-23 PROCEDURE — 80197 ASSAY OF TACROLIMUS: CPT

## 2025-06-24 ENCOUNTER — PATIENT OUTREACH (OUTPATIENT)
Dept: CARE COORDINATION | Age: 60
End: 2025-06-24
Payer: MEDICAID

## 2025-06-24 DIAGNOSIS — Z94.0 KIDNEY REPLACED BY TRANSPLANT (HHS-HCC): ICD-10-CM

## 2025-06-24 NOTE — PROGRESS NOTES
Called pt   Temp:  98.6  Intake 1750ml   Output son is unsure    Incision appearance: steristrips    drainage: none    odor  : none     Stools: good   Appetite : good   Questions/Issues: both legs swollen   Education needed  Next clinic visit: 6/25/25      Any equipment issues  Will continue to monitor

## 2025-06-25 ENCOUNTER — OFFICE VISIT (OUTPATIENT)
Facility: HOSPITAL | Age: 60
End: 2025-06-25
Payer: MEDICAID

## 2025-06-25 ENCOUNTER — PHARMACY VISIT (OUTPATIENT)
Dept: PHARMACY | Facility: CLINIC | Age: 60
End: 2025-06-25
Payer: MEDICAID

## 2025-06-25 ENCOUNTER — MEDICATION MANAGEMENT (OUTPATIENT)
Facility: HOSPITAL | Age: 60
End: 2025-06-25

## 2025-06-25 ENCOUNTER — PATIENT OUTREACH (OUTPATIENT)
Dept: CARE COORDINATION | Age: 60
End: 2025-06-25

## 2025-06-25 VITALS
WEIGHT: 153.6 LBS | HEART RATE: 65 BPM | DIASTOLIC BLOOD PRESSURE: 79 MMHG | TEMPERATURE: 97.9 F | BODY MASS INDEX: 30 KG/M2 | SYSTOLIC BLOOD PRESSURE: 163 MMHG | OXYGEN SATURATION: 100 %

## 2025-06-25 DIAGNOSIS — Z79.899 IMMUNOSUPPRESSIVE MANAGEMENT ENCOUNTER FOLLOWING KIDNEY TRANSPLANT: Primary | ICD-10-CM

## 2025-06-25 DIAGNOSIS — Z94.0 IMMUNOSUPPRESSIVE MANAGEMENT ENCOUNTER FOLLOWING KIDNEY TRANSPLANT: Primary | ICD-10-CM

## 2025-06-25 DIAGNOSIS — E83.39 HYPOPHOSPHATEMIA: ICD-10-CM

## 2025-06-25 DIAGNOSIS — R60.0 BILATERAL LOWER EXTREMITY EDEMA: ICD-10-CM

## 2025-06-25 DIAGNOSIS — Z94.0 KIDNEY REPLACED BY TRANSPLANT (HHS-HCC): ICD-10-CM

## 2025-06-25 DIAGNOSIS — E78.2 MIXED HYPERLIPIDEMIA: ICD-10-CM

## 2025-06-25 PROCEDURE — 3078F DIAST BP <80 MM HG: CPT | Performed by: STUDENT IN AN ORGANIZED HEALTH CARE EDUCATION/TRAINING PROGRAM

## 2025-06-25 PROCEDURE — 3044F HG A1C LEVEL LT 7.0%: CPT | Performed by: STUDENT IN AN ORGANIZED HEALTH CARE EDUCATION/TRAINING PROGRAM

## 2025-06-25 PROCEDURE — 3077F SYST BP >= 140 MM HG: CPT | Performed by: STUDENT IN AN ORGANIZED HEALTH CARE EDUCATION/TRAINING PROGRAM

## 2025-06-25 PROCEDURE — RXMED WILLOW AMBULATORY MEDICATION CHARGE

## 2025-06-25 PROCEDURE — 99214 OFFICE O/P EST MOD 30 MIN: CPT | Performed by: STUDENT IN AN ORGANIZED HEALTH CARE EDUCATION/TRAINING PROGRAM

## 2025-06-25 PROCEDURE — 99214 OFFICE O/P EST MOD 30 MIN: CPT | Mod: 24 | Performed by: STUDENT IN AN ORGANIZED HEALTH CARE EDUCATION/TRAINING PROGRAM

## 2025-06-25 RX ORDER — ROSUVASTATIN CALCIUM 10 MG/1
10 TABLET, COATED ORAL NIGHTLY
Qty: 90 TABLET | Refills: 3 | Status: SHIPPED | OUTPATIENT
Start: 2025-06-25 | End: 2026-06-25

## 2025-06-25 RX ORDER — TACROLIMUS 0.5 MG/1
CAPSULE ORAL
Qty: 90 CAPSULE | Refills: 11 | Status: SHIPPED | OUTPATIENT
Start: 2025-06-25 | End: 2026-06-25

## 2025-06-25 RX ORDER — FUROSEMIDE 40 MG/1
40 TABLET ORAL DAILY
Qty: 30 TABLET | Refills: 11 | Status: SHIPPED | OUTPATIENT
Start: 2025-06-25 | End: 2026-06-25

## 2025-06-25 RX ORDER — PREDNISONE 5 MG/1
15 TABLET ORAL DAILY
Qty: 90 TABLET | Refills: 11 | Status: SHIPPED | OUTPATIENT
Start: 2025-06-25 | End: 2026-06-25

## 2025-06-25 RX ORDER — MYCOPHENOLATE MOFETIL 250 MG/1
1000 CAPSULE ORAL 2 TIMES DAILY
Qty: 240 CAPSULE | Refills: 11 | Status: SHIPPED | OUTPATIENT
Start: 2025-06-25 | End: 2026-06-25

## 2025-06-25 ASSESSMENT — PAIN SCALES - GENERAL: PAINLEVEL_OUTOF10: 0-NO PAIN

## 2025-06-25 NOTE — PROGRESS NOTES
TRANSPLANT SURGERY FOLLOW UP    Reason for visit:    Luis Panda underwent transplant surgery,  6/10/2025 (Kidney), and returns to clinic for follow up evaluation focusing on their current immunosuppression. Specifically, Luis Panda's regiment was evaluated to ensure adequate levels to prevent life threatening or organ function impairing rejection while not increasing risk of adverse effects including malignancy, infection, bone health, or accelerated cardiovascular disease.  I reviewed common side effects including tremor, hair loss, GI toxicity, and agitation.  The patient reported that they were experiencing: none.    The long-term management of maintenance immunosuppression in organ transplant recipients remains complex given differences in clinical characteristics, comorbid conditions, and prior rejection episodes.  These factors were evaluated at this visit and used in formulating this plan of care. Immunosuppression following the transplant is medically necessary to closely monitor and to reduce the risk of post-operative complications distinct from the post operative management of the transplant surgical procedure.     Interval history  Doing well    Problem List[1]    Medications:    Current Outpatient Medications   Medication Instructions    acyclovir (ZOVIRAX) 400 mg, oral, 2 times daily    [Paused] aspirin 81 mg EC tablet Take 1 tablet (81 mg) by mouth once daily.    atovaquone (MEPRON) 1,500 mg, oral, Daily    carvedilol (COREG) 25 mg, oral, 2 times daily (morning and late afternoon)    clotrimazole (MYCELEX) 10 mg, Mouth/Throat, 3 times daily (morning, midday, late afternoon)    gabapentin (NEURONTIN) 100 mg, oral, Daily    HumuLIN N NPH Insulin KwikPen 12 Units, subcutaneous, Every 24 hours scheduled, Take as directed per insulin instructions.    hydrALAZINE (APRESOLINE) 25 mg, oral, 3 times daily    insulin lispro (HUMALOG) 0-10 Units, subcutaneous, 3 times daily before meals, Take as  "directed per insulin instructions.  Hypoglycemia protocol Call LIP unit(s) if Blood Glucose is between 0 - 70 mg/d   0 unit(s) if Blood glucose is between    1 unit(s) if Blood glucose is between 201-250   2 unit(s) if Blood glucose is between 251-300   3 unit(s) if Bloodglucose is between 301-350   4 unit(s) if Blood glucose is between 351-400   If blood glucose is greater than 400 mg/dL, give max insulin per sliding scale AND then contact provider.    letermovir (PREVYMIS) 480 mg, oral, Daily    methocarbamol (ROBAXIN) 500 mg, oral, Every 6 hours PRN    mycophenolate (CELLCEPT) 750 mg, oral, 2 times daily    pantoprazole (PROTONIX) 40 mg, oral, Daily before breakfast, Do not crush, chew, or split.    pen needle, diabetic 32 gauge x 5/32\" needle Use 1 each 5 times a day.    polyethylene glycol (GLYCOLAX, MIRALAX) 17 g, oral, Daily    predniSONE (DELTASONE) 20 mg, oral, Daily    [Paused] Promacta 12.5 mg, oral, Daily before breakfast, Take on an empty stomach, 1 hour before or 2 hours afer a meal.    tacrolimus (PROGRAF) 0.5 mg, oral, 2 times daily    traZODone (Desyrel) 50 mg tablet 1 tablet, oral, Nightly    TRUEplus Lancets 33 gauge misc        Physical Exam  Vitals:    06/25/25 1051   BP: 163/79   Pulse: 65   Temp: 36.6 °C (97.9 °F)   SpO2: 100%        General: Alert and oriented to time, place and person. Not in distress  ENT: unremarkable  Cardiovascular: Regular rate, normotensive  Respiratory: no signs of labored breathing on room air  Abdomen/ GI: Kidney transplant incision C/D/I, staples in place  Extremity: BLE trace edema -  Skin: no rash    ALLERGY:  Allergies[2]    LABS:  No results found for this or any previous visit (from the past 24 hours).   Lab Results   Component Value Date    ALT 13 06/10/2025    AST 22 06/10/2025    ALKPHOS 76 06/10/2025    BILITOT 0.6 06/10/2025         ASSESSMENT AND PLAN:    Ms. Panda is a 60 y.o. female who underwent  transplant surgery on 6/10/2025 " (Kidney).    DCD  KDPI 73  PRA 19  Pre-dialysis  Start Lasix 40 daily for a week for bilateral ankle edema    1. Immunosuppression reviewed and adjusted   Induction: Thymo 1.5 converted to Simulect (thrombocytopenia)  Tacrolimus: Yes - 0.5 bid, inc to 1/0.5, goal 8-10       Mycophenolate: Yes - 750 po bid - increase to 1000 mg       Prednisone: Yes - dec to 15 mg     2. Prophylaxis adherence protocol to prevent immunosuppression related infection      Acyclovir, Letermovir (hx of ITP), ATQ    3. Hypertension         Blood Pressures         6/25/2025  1051             BP: 163/79              Current antihypertensives: Coreg 25 bid, hydralazine 25 tid   (Amlodipine off due to peripheral edema)          4. Wound/ULI      [place] ready for removal: No    5. GI prophylaxis       On PPI     6.  ITP  Heme follow-up needs to be moved up    7. Follow up:       Labs  2 times per week       RTC: 1 week(s)    I spent a total of 32 min providing care for this patient including record review, examination, face to face counseling, and documentation.     Ghanshyam You MD         [1]   Patient Active Problem List  Diagnosis    Age-related nuclear cataract of both eyes    Anemia    Arteriovenous fistula    Cerebral aneurysm, nonruptured ([facility])    Diabetes mellitus with chronic kidney disease (Multi)    Essential hypertension    Gastroesophageal reflux disease without esophagitis    Insomnia    Left arm swelling    Memory loss    Mixed hyperlipidemia    Obesity, Class I, BMI 30-34.9    Platelets decreased    Polycystic kidney, unspecified    Presbyopia    Right flank pain    Type 2 diabetes mellitus without complication, without long-term current use of insulin    Vision impairment    PONV (postoperative nausea and vomiting)    Kidney replaced by transplant ([facility])    CLL (chronic lymphocytic leukemia) (Multi)   [2]   Allergies  Allergen Reactions    Amlodipine Other     LEG SWELLING    Oxycodone Other     Dizziness, weakness

## 2025-06-25 NOTE — PROGRESS NOTES
Pharmacist Post-Transplant Clinic Visit    Luis Panda is a 60 y.o. female who underwent Kidney transplant on 6/10/2025 (Kidney), postoperative day 15    Luis was seen today in transplant surgery clinic by the transplant pharmacist. The patient's son was also present for this education session.     Medications Ordered Prior to Encounter[1]     Medication List            Accurate as of June 25, 2025 12:10 PM. If you have any questions, ask your nurse or doctor.                PAUSE taking these medications        8AM 10AM Noon 6PM 8PM Bedtime   aspirin 81 mg EC tablet  Wait to take this until your doctor or other care provider tells you to start again.  Dose: 81 mg   [    ]   [    ]   [    ]   [    ]   [    ]   [    ]     Promacta 12.5 mg tablet  Wait to take this until your doctor or other care provider tells you to start again.  Dose: 12.5 mg  Take 1 tablet (12.5 mg) by mouth once daily in the morning. Take before meals. Take on an empty stomach, 1 hour before or 2 hours afer a meal.  Generic drug: eltrombopag olamine   [    ]   [    ]   [    ]   [    ]   [    ]   [    ]            START taking these medications        8AM 10AM Noon 6PM 8PM Bedtime   furosemide 40 mg tablet  Dose: 40 mg  Take 1 tablet (40 mg) by mouth once daily.  Commonly known as: Lasix  Started by: Ghanshyam You   [    ]   [    ]   [    ]   [    ]   [    ]   [    ]     rosuvastatin 10 mg tablet  Dose: 10 mg  Take 1 tablet (10 mg) by mouth once daily at bedtime.  Commonly known as: Crestor  Started by: Ghanshyam You   [    ]   [    ]   [    ]   [    ]   [    ]   [    ]     sod phos di, mono-K phos mono tablet  Dose: 2 tablet  Take 2 tablets (500 mg) by mouth 2 times a day.  Commonly known as: K Phos Neutral  Started by: Ghanshyam You   [    ]   [    ]   [    ]   [    ]   [    ]   [    ]            CHANGE how you take these medications        8AM 10AM Noon 6PM 8PM Bedtime   mycophenolate 250 mg capsule  Dose: 1,000 mg  What changed: how much to  "take  Take 4 capsules (1,000 mg) by mouth 2 times a day.  Commonly known as: Cellcept  Changed by: Ghanshyam You   [    ]   [    ]   [    ]   [    ]   [    ]   [    ]     predniSONE 5 mg tablet  Dose: 15 mg  What changed: how much to take  Take 3 tablets (15 mg) by mouth once daily.  Commonly known as: Deltasone  Changed by: Ghanshyam You   [    ]   [    ]   [    ]   [    ]   [    ]   [    ]     tacrolimus 0.5 mg capsule  What changed: See the new instructions.  Take 2 capsules (1 mg) by mouth once daily in the morning AND 1 capsule (0.5 mg) once daily at bedtime.  Commonly known as: Prograf  Changed by: Ghanshyam You   [    ]   [    ]   [    ]   [    ]   [    ]   [    ]            CONTINUE taking these medications        8AM 10AM Noon 6PM 8PM Bedtime   acyclovir 400 mg tablet  Dose: 400 mg  Take 1 tablet (400 mg) by mouth 2 times a day.  Commonly known as: Zovirax   [    ]   [    ]   [    ]   [    ]   [    ]   [    ]     atovaquone 750 mg/5 mL suspension  Dose: 1,500 mg  Take 10 mL (1,500 mg) by mouth once daily.  Commonly known as: Mepron   [    ]   [    ]   [    ]   [    ]   [    ]   [    ]     BD Ultra-Fine Linda Pen Needle 32 gauge x 5/32\" needle  Dose: 1 each  Use 1 each 5 times a day.  Generic drug: pen needle, diabetic   [    ]   [    ]   [    ]   [    ]   [    ]   [    ]     carvedilol 25 mg tablet  Dose: 25 mg  Commonly known as: Coreg   [    ]   [    ]   [    ]   [    ]   [    ]   [    ]     clotrimazole 10 mg elissa  Dose: 10 mg  Use 1 tablet (10 mg) in the mouth or throat 3 times daily (morning, midday, late afternoon).  Commonly known as: Mycelex   [    ]   [    ]   [    ]   [    ]   [    ]   [    ]     gabapentin 100 mg capsule  Dose: 100 mg  Take 1 capsule (100 mg) by mouth once daily.  Commonly known as: Neurontin   [    ]   [    ]   [    ]   [    ]   [    ]   [    ]     HumuLIN N NPH Insulin KwikPen 100 unit/mL (3 mL) pen  Dose: 12 Units  Inject 12 Units under the skin once every 24 hours. Take as " directed per insulin instructions.  Generic drug: insulin NPH (Isophane)   [    ]   [    ]   [    ]   [    ]   [    ]   [    ]     hydrALAZINE 25 mg tablet  Dose: 25 mg  Take 1 tablet (25 mg) by mouth 3 times a day.  Commonly known as: Apresoline   [    ]   [    ]   [    ]   [    ]   [    ]   [    ]     insulin lispro 100 unit/mL pen  Dose: 0-10 Units  Inject 0-10 Units under the skin 3 times a day before meals. Take as directed per insulin instructions.  Hypoglycemia protocol Call LIP unit(s) if Blood Glucose is between 0 - 70 mg/d   0 unit(s) if Blood glucose is between    1 unit(s) if Blood glucose is between 201-250   2 unit(s) if Blood glucose is between 251-300   3 unit(s) if Bloodglucose is between 301-350   4 unit(s) if Blood glucose is between 351-400   If blood glucose is greater than 400 mg/dL, give max insulin per sliding scale AND then contact provider.  Commonly known as: HumaLOG   [    ]   [    ]   [    ]   [    ]   [    ]   [    ]     letermovir 480 mg tablet  Dose: 480 mg  Take 1 tablet (480 mg) by mouth once daily.  Commonly known as: Prevymis   [    ]   [    ]   [    ]   [    ]   [    ]   [    ]     methocarbamol 500 mg tablet  Dose: 500 mg  Take 1 tablet (500 mg) by mouth every 6 hours if needed for muscle spasms for up to 5 days.  Commonly known as: Robaxin   [    ]   [    ]   [    ]   [    ]   [    ]   [    ]     pantoprazole 40 mg EC tablet  Dose: 40 mg  Take 1 tablet (40 mg) by mouth once daily in the morning. Take before meals. Do not crush, chew, or split.  Commonly known as: ProtoNix   [    ]   [    ]   [    ]   [    ]   [    ]   [    ]     polyethylene glycol 17 gram/dose powder  Dose: 17 g  Mix 17 g of powder and drink once daily.  Commonly known as: Glycolax, Miralax   [    ]   [    ]   [    ]   [    ]   [    ]   [    ]     traZODone 50 mg tablet  Dose: 1 tablet  Commonly known as: Desyrel   [    ]   [    ]   [    ]   [    ]   [    ]   [    ]     TRUEplus Lancets 33 gauge  misc  Generic drug: lancets   [    ]   [    ]   [    ]   [    ]   [    ]   [    ]            STOP taking these medications      simvastatin 20 mg tablet  Commonly known as: Zocor  Stopped by: Ghanshyam You               Where to Get Your Medications        These medications were sent to [facility]  [address], [address], [facility] [address]      Hours: 8AM to 6PM Mon-Fri, 8AM to 4PM Sat, 9AM to 1PM Sun Phone: [phone]   furosemide 40 mg tablet  mycophenolate 250 mg capsule  predniSONE 5 mg tablet  rosuvastatin 10 mg tablet  sod phos di, mono-K phos mono tablet  tacrolimus 0.5 mg capsule         Post-Transplant Medication Discussion:    Pillbox  Pillbox still had slots filled for Wednesday and Thursday by Nuris at last visit, as appropriate. Pt's son will be filling pillbox. Son works in healthcare as a  and feels comfortable filling pillbox for patient going forward.  Per son's request, started changes after the slots that were filled (med list changes in effect for Friday, Saturday, Sunday, Monday and Tuesday slots).  The medication pillbox was filled for the rest of the week with the following caveats:  Son to  furosemide, rosuvastatin and K phos neutral from pharmacy to add to box. He feels comfortable adding at home.  Son to take the following meds out of pillpacks to add to pillbox at home:  Carvedilol 25 mg, to place in morning and bedtime slots of Saturday, Sunday, Monday and Tuesday  Trazodone 50 mg, to place in bedtime slots of Sunday, Monday, and Tuesday.  These were clearly marked on medication sheet and written on AVS and handed to son. Son read back changes and confirmed understanding.  To avoid potential mix-up with simvastatin and rosuvastatin, the simvastatin was removed from the box entirely. Son will follow instructions on med list sheet to add rosuvastatin to bedtime slots.  Son is appropriately disposing of medications remaining from  pillpacks that are not currently used by patient.    Transplant Medication Teaching  Approximately 30 minutes were spent with this patient providing medication education.  An outpatient dosing schedule was created for all oral medications.   This schedule was discussed in detail with the patient's son/caregiver, including drug name, use, dose, and the appropriate timing of self-administration (i.e. with or without meals, with or without other medications).   The patient's son/caregiver was taught the process for how to fill the pillbox.    Pharmacy Interventions  Medication list creation  Pillbox fill  Education  Facilitation of refills    Transplant Pharmacy Follow Up  Discussed MyChart; patient's son is unsure if he has access.  Told son that medication changes may happen in between clinic visits based on lab results.  Advised son to call clinic if difficulty accessing because we use MyChart to communicate these changes.  Son's MyChart access is my main concern for this patient going forward.  Son to bring pillbox filled for next clinic appointment for final check.    Time spent: 30 minutes    Nanci Owens PharmD   PGY-1 Pharmacy Resident       [1]   Current Outpatient Medications on File Prior to Visit   Medication Sig Dispense Refill    acyclovir (Zovirax) 400 mg tablet Take 1 tablet (400 mg) by mouth 2 times a day. 60 tablet 2    [Paused] aspirin 81 mg EC tablet Take 1 tablet (81 mg) by mouth once daily.      atovaquone (Mepron) 750 mg/5 mL suspension Take 10 mL (1,500 mg) by mouth once daily. 300 mL 5    carvedilol (Coreg) 25 mg tablet Take 1 tablet (25 mg) by mouth 2 times daily (morning and late afternoon).      clotrimazole (Mycelex) 10 mg marcial Use 1 tablet (10 mg) in the mouth or throat 3 times daily (morning, midday, late afternoon). 90 Marcial 2    [Paused] eltrombopag olamine (Promacta) 12.5 mg tablet Take 1 tablet (12.5 mg) by mouth once daily in the morning. Take before meals. Take on an empty  "stomach, 1 hour before or 2 hours afer a meal. 30 tablet 11    furosemide (Lasix) 40 mg tablet Take 1 tablet (40 mg) by mouth once daily. 30 tablet 11    gabapentin (Neurontin) 100 mg capsule Take 1 capsule (100 mg) by mouth once daily. 30 capsule 11    hydrALAZINE (Apresoline) 25 mg tablet Take 1 tablet (25 mg) by mouth 3 times a day. 90 tablet 11    insulin lispro (HumaLOG) 100 unit/mL pen Inject 0-10 Units under the skin 3 times a day before meals. Take as directed per insulin instructions.  Hypoglycemia protocol Call LIP unit(s) if Blood Glucose is between 0 - 70 mg/d   0 unit(s) if Blood glucose is between    1 unit(s) if Blood glucose is between 201-250   2 unit(s) if Blood glucose is between 251-300   3 unit(s) if Bloodglucose is between 301-350   4 unit(s) if Blood glucose is between 351-400   If blood glucose is greater than 400 mg/dL, give max insulin per sliding scale AND then contact provider.      insulin NPH, Isophane, (HumuLIN N,NovoLIN N) 100 unit/mL (3 mL) pen Inject 12 Units under the skin once every 24 hours. Take as directed per insulin instructions. 15 mL 11    letermovir (Prevymis) 480 mg tablet Take 1 tablet (480 mg) by mouth once daily. 30 tablet 2    methocarbamol (Robaxin) 500 mg tablet Take 1 tablet (500 mg) by mouth every 6 hours if needed for muscle spasms for up to 5 days. 20 tablet 0    mycophenolate (Cellcept) 250 mg capsule Take 4 capsules (1,000 mg) by mouth 2 times a day. 240 capsule 11    pantoprazole (ProtoNix) 40 mg EC tablet Take 1 tablet (40 mg) by mouth once daily in the morning. Take before meals. Do not crush, chew, or split. 30 tablet 0    pen needle, diabetic 32 gauge x 5/32\" needle Use 1 each 5 times a day. 100 each 0    polyethylene glycol (Glycolax, Miralax) 17 gram/dose powder Mix 17 g of powder and drink once daily. 238 g 0    predniSONE (Deltasone) 5 mg tablet Take 3 tablets (15 mg) by mouth once daily. 90 tablet 11    rosuvastatin (Crestor) 10 mg tablet Take " 1 tablet (10 mg) by mouth once daily at bedtime. 90 tablet 3    sod phos di, mono-K phos mono (K Phos Neutral) tablet Take 2 tablets (500 mg) by mouth 2 times a day. 120 tablet 1    tacrolimus (Prograf) 0.5 mg capsule Take 2 capsules (1 mg) by mouth once daily in the morning AND 1 capsule (0.5 mg) once daily at bedtime. 90 capsule 11    traZODone (Desyrel) 50 mg tablet Take 1 tablet (50 mg) by mouth once daily at bedtime.      TRUEplus Lancets 33 gauge misc       [DISCONTINUED] mycophenolate (Cellcept) 250 mg capsule Take 3 capsules (750 mg) by mouth 2 times a day. 180 capsule 11    [DISCONTINUED] predniSONE (Deltasone) 5 mg tablet Take 4 tablets (20 mg) by mouth once daily. 120 tablet 11    [DISCONTINUED] tacrolimus (Prograf) 0.5 mg capsule Take 1 capsule (0.5 mg) by mouth 2 times a day. 60 capsule 11     No current facility-administered medications on file prior to visit.

## 2025-06-25 NOTE — Clinical Note
The site was marked. Prepped with: ChloraPrep. The patient was draped. Lower back
hard copy, drawn during this pregnancy

## 2025-06-25 NOTE — PATIENT INSTRUCTIONS
Medication Changes:  Start lasix (furosemide) 40mg (1 tab) every morning  Increase mycophenolate to 1,000mg (4 capsules) twice a day  Stop simvastatin   Start rosuvastatin 10mg (1 tab) every night  Decrease prednisone to 15mg (3 tabs) every morning  Increase tacrolimus to 1mg (2 capsules) every morning and 0.5mg (1 capsule) every night  Start phosphorus 2 tablets every morning & every night    Plan:  You're doing well!  Ok to remove binder  Ok to shower - no soaking in bathtub  Let soapy water run over the incision, but do not scrub  No lifting/pushing/pulling more than 10 pounds  Labs twice a week on Mondays & Thursdays  Next clinic appt in 1 weeks on 7/2 at 9:30am

## 2025-06-26 ENCOUNTER — LAB REQUISITION (OUTPATIENT)
Dept: LAB | Facility: CLINIC | Age: 60
End: 2025-06-26
Payer: MEDICAID

## 2025-06-26 ENCOUNTER — PATIENT OUTREACH (OUTPATIENT)
Dept: CARE COORDINATION | Age: 60
End: 2025-06-26
Payer: MEDICAID

## 2025-06-26 DIAGNOSIS — N18.6 END STAGE RENAL DISEASE (MULTI): ICD-10-CM

## 2025-06-26 DIAGNOSIS — Z94.0 KIDNEY TRANSPLANT STATUS: ICD-10-CM

## 2025-06-26 LAB
DIAGNOSIS-VIRTUAL CROSSMATCH: NORMAL
HLA CLS I TYP PNL BLD/T DONR HIGH RES: NORMAL
HLA RESULTS: NORMAL
HLA-DP2 QL: NORMAL
HLA-DQB1 HIGH RES: NORMAL
HLA-DRB1 HIGH RES: NORMAL
PATH REVIEW-VIRTUAL CROSSMATCH: NORMAL
PATHOLOGIST ID-VIRTUAL CROSSMATCH: NORMAL

## 2025-06-26 NOTE — PROGRESS NOTES
swollen ankles, son says getting better    Called pt   Temp:  99.2   Intake 2000ml  Output 1400ml  Incision appearance:    steristrips to be taken off Wed 7/2     drainage/odor: none      Stools mild constipation   Appetite good   Questions/Issues: none  Education needed: none  Next clinic visit: Wed 7/2     Any equipment issues: none  Will continue to monitor

## 2025-06-27 ENCOUNTER — PATIENT OUTREACH (OUTPATIENT)
Dept: CARE COORDINATION | Age: 60
End: 2025-06-27
Payer: MEDICAID

## 2025-06-27 DIAGNOSIS — Z94.0 KIDNEY REPLACED BY TRANSPLANT (HHS-HCC): ICD-10-CM

## 2025-06-27 NOTE — PROGRESS NOTES
6/27/2025  Temp:  9.2   Intake 2000ml  Output 1400ml  Incision appearance: steristrips to be taken off Wed 7/2     drainage/odor: none      Stools mild constipation   Appetite good   Questions/Issues: none  Education needed: none  Next clinic visit: Wed 7/2     Any equipment issues: none  Will continue to monitor

## 2025-06-29 ENCOUNTER — PATIENT OUTREACH (OUTPATIENT)
Dept: CARE COORDINATION | Age: 60
End: 2025-06-29
Payer: MEDICAID

## 2025-06-29 ENCOUNTER — DOCUMENTATION (OUTPATIENT)
Dept: CARE COORDINATION | Age: 60
End: 2025-06-29
Payer: MEDICAID

## 2025-06-29 VITALS — BODY MASS INDEX: 29.26 KG/M2 | WEIGHT: 149.8 LBS

## 2025-06-29 NOTE — PROGRESS NOTES
Called pts home spoke to son pt is doing well has some incisional pain Tylenol is effective  also does have Tramadol if needed   Intake 2L  Output 1400 ml  Incision with staples intact no redness no drainage  Moving bowels   Appetite fair   getting labs on Monday  Will continue to monitor

## 2025-06-30 ENCOUNTER — TELEPHONE (OUTPATIENT)
Facility: HOSPITAL | Age: 60
End: 2025-06-30

## 2025-06-30 ENCOUNTER — PATIENT OUTREACH (OUTPATIENT)
Dept: CARE COORDINATION | Age: 60
End: 2025-06-30

## 2025-06-30 ENCOUNTER — LAB (OUTPATIENT)
Dept: LAB | Facility: HOSPITAL | Age: 60
End: 2025-06-30
Payer: MEDICAID

## 2025-06-30 LAB
ALBUMIN SERPL BCP-MCNC: 3.3 G/DL (ref 3.4–5)
ANION GAP SERPL CALC-SCNC: 12 MMOL/L (ref 10–20)
BUN SERPL-MCNC: 28 MG/DL (ref 6–23)
CALCIUM SERPL-MCNC: 7.6 MG/DL (ref 8.6–10.3)
CHLORIDE SERPL-SCNC: 101 MMOL/L (ref 98–107)
CO2 SERPL-SCNC: 32 MMOL/L (ref 21–32)
CREAT SERPL-MCNC: 1.51 MG/DL (ref 0.5–1.05)
EGFRCR SERPLBLD CKD-EPI 2021: 39 ML/MIN/1.73M*2
ERYTHROCYTE [DISTWIDTH] IN BLOOD BY AUTOMATED COUNT: 18.7 % (ref 11.5–14.5)
GLUCOSE SERPL-MCNC: 105 MG/DL (ref 74–99)
HCT VFR BLD AUTO: 29.9 % (ref 36–46)
HGB BLD-MCNC: 9.9 G/DL (ref 12–16)
MAGNESIUM SERPL-MCNC: 1.07 MG/DL (ref 1.6–2.4)
MCH RBC QN AUTO: 33.7 PG (ref 26–34)
MCHC RBC AUTO-ENTMCNC: 33.1 G/DL (ref 32–36)
MCV RBC AUTO: 102 FL (ref 80–100)
NRBC BLD-RTO: 0 /100 WBCS (ref 0–0)
PHOSPHATE SERPL-MCNC: 3.5 MG/DL (ref 2.5–4.9)
PLATELET # BLD AUTO: 48 X10*3/UL (ref 150–450)
POTASSIUM SERPL-SCNC: 3 MMOL/L (ref 3.5–5.3)
RBC # BLD AUTO: 2.94 X10*6/UL (ref 4–5.2)
SODIUM SERPL-SCNC: 142 MMOL/L (ref 136–145)
TACROLIMUS BLD-MCNC: 9.5 NG/ML
WBC # BLD AUTO: 6.9 X10*3/UL (ref 4.4–11.3)

## 2025-06-30 PROCEDURE — 80069 RENAL FUNCTION PANEL: CPT

## 2025-06-30 PROCEDURE — 80197 ASSAY OF TACROLIMUS: CPT

## 2025-06-30 PROCEDURE — 83010 ASSAY OF HAPTOGLOBIN QUANT: CPT

## 2025-06-30 PROCEDURE — 83735 ASSAY OF MAGNESIUM: CPT

## 2025-06-30 PROCEDURE — 85027 COMPLETE CBC AUTOMATED: CPT

## 2025-07-01 ENCOUNTER — PATIENT OUTREACH (OUTPATIENT)
Dept: CARE COORDINATION | Age: 60
End: 2025-07-01
Payer: MEDICAID

## 2025-07-01 ENCOUNTER — HOSPITAL ENCOUNTER (INPATIENT)
Facility: HOSPITAL | Age: 60
LOS: 1 days | Discharge: HOME | End: 2025-07-02
Attending: STUDENT IN AN ORGANIZED HEALTH CARE EDUCATION/TRAINING PROGRAM | Admitting: INTERNAL MEDICINE
Payer: MEDICAID

## 2025-07-01 ENCOUNTER — CLINICAL SUPPORT (OUTPATIENT)
Dept: EMERGENCY MEDICINE | Facility: HOSPITAL | Age: 60
End: 2025-07-01
Payer: MEDICAID

## 2025-07-01 DIAGNOSIS — E83.42 HYPOMAGNESEMIA: ICD-10-CM

## 2025-07-01 DIAGNOSIS — D69.6 THROMBOCYTOPENIA: Primary | ICD-10-CM

## 2025-07-01 DIAGNOSIS — Z94.0 KIDNEY REPLACED BY TRANSPLANT (HHS-HCC): ICD-10-CM

## 2025-07-01 DIAGNOSIS — E87.6 HYPOKALEMIA: ICD-10-CM

## 2025-07-01 LAB
ALBUMIN SERPL BCP-MCNC: 3.4 G/DL (ref 3.4–5)
ALBUMIN SERPL BCP-MCNC: 3.7 G/DL (ref 3.4–5)
ALP SERPL-CCNC: 64 U/L (ref 33–136)
ALT SERPL W P-5'-P-CCNC: 26 U/L (ref 7–45)
ANION GAP SERPL CALC-SCNC: 12 MMOL/L (ref 10–20)
ANION GAP SERPL CALC-SCNC: 13 MMOL/L (ref 10–20)
ANION GAP SERPL CALC-SCNC: 16 MMOL/L (ref 10–20)
APPEARANCE UR: CLEAR
APTT PPP: 23 SECONDS (ref 26–36)
AST SERPL W P-5'-P-CCNC: 27 U/L (ref 9–39)
ATRIAL RATE: 62 BPM
BASOPHILS # BLD MANUAL: 0 X10*3/UL (ref 0–0.1)
BASOPHILS NFR BLD MANUAL: 0 %
BILIRUB SERPL-MCNC: 1.1 MG/DL (ref 0–1.2)
BILIRUB UR STRIP.AUTO-MCNC: NEGATIVE MG/DL
BLASTS # BLD MANUAL: 0 X10*3/UL
BLASTS NFR BLD MANUAL: 0 %
BNP SERPL-MCNC: 206 PG/ML (ref 0–99)
BUN SERPL-MCNC: 23 MG/DL (ref 6–23)
BUN SERPL-MCNC: 25 MG/DL (ref 6–23)
BUN SERPL-MCNC: 26 MG/DL (ref 6–23)
CA-I BLD-SCNC: 1.07 MMOL/L (ref 1.1–1.33)
CALCIUM SERPL-MCNC: 8 MG/DL (ref 8.6–10.6)
CALCIUM SERPL-MCNC: 8.5 MG/DL (ref 8.6–10.6)
CALCIUM SERPL-MCNC: 8.5 MG/DL (ref 8.6–10.6)
CHLORIDE SERPL-SCNC: 100 MMOL/L (ref 98–107)
CHLORIDE SERPL-SCNC: 102 MMOL/L (ref 98–107)
CHLORIDE SERPL-SCNC: 98 MMOL/L (ref 98–107)
CO2 SERPL-SCNC: 30 MMOL/L (ref 21–32)
CO2 SERPL-SCNC: 31 MMOL/L (ref 21–32)
CO2 SERPL-SCNC: 31 MMOL/L (ref 21–32)
COLOR UR: ABNORMAL
CREAT SERPL-MCNC: 1.32 MG/DL (ref 0.5–1.05)
CREAT SERPL-MCNC: 1.51 MG/DL (ref 0.5–1.05)
CREAT SERPL-MCNC: 1.62 MG/DL (ref 0.5–1.05)
EGFRCR SERPLBLD CKD-EPI 2021: 36 ML/MIN/1.73M*2
EGFRCR SERPLBLD CKD-EPI 2021: 39 ML/MIN/1.73M*2
EGFRCR SERPLBLD CKD-EPI 2021: 46 ML/MIN/1.73M*2
EOSINOPHIL # BLD MANUAL: 0 X10*3/UL (ref 0–0.7)
EOSINOPHIL NFR BLD MANUAL: 0 %
ERYTHROCYTE [DISTWIDTH] IN BLOOD BY AUTOMATED COUNT: 17 % (ref 11.5–14.5)
ERYTHROCYTE [DISTWIDTH] IN BLOOD BY AUTOMATED COUNT: 17.4 % (ref 11.5–14.5)
ERYTHROCYTE [DISTWIDTH] IN BLOOD BY AUTOMATED COUNT: 17.6 % (ref 11.5–14.5)
GLUCOSE BLD MANUAL STRIP-MCNC: 121 MG/DL (ref 74–99)
GLUCOSE BLD MANUAL STRIP-MCNC: 186 MG/DL (ref 74–99)
GLUCOSE BLD MANUAL STRIP-MCNC: 96 MG/DL (ref 74–99)
GLUCOSE SERPL-MCNC: 140 MG/DL (ref 74–99)
GLUCOSE SERPL-MCNC: 157 MG/DL (ref 74–99)
GLUCOSE SERPL-MCNC: 209 MG/DL (ref 74–99)
GLUCOSE UR STRIP.AUTO-MCNC: ABNORMAL MG/DL
HAPTOGLOB SERPL NEPH-MCNC: <30 MG/DL (ref 30–200)
HCT VFR BLD AUTO: 26.4 % (ref 36–46)
HCT VFR BLD AUTO: 28.8 % (ref 36–46)
HCT VFR BLD AUTO: 30.4 % (ref 36–46)
HGB BLD-MCNC: 10.4 G/DL (ref 12–16)
HGB BLD-MCNC: 8.9 G/DL (ref 12–16)
HGB BLD-MCNC: 9.8 G/DL (ref 12–16)
HGB RETIC QN: 36 PG (ref 28–38)
HOLD SPECIMEN: NORMAL
HOLD SPECIMEN: NORMAL
HYALINE CASTS #/AREA URNS AUTO: ABNORMAL /LPF
IMM GRANULOCYTES # BLD AUTO: 0.02 X10*3/UL (ref 0–0.7)
IMM GRANULOCYTES NFR BLD AUTO: 0.3 % (ref 0–0.9)
IMMATURE RETIC FRACTION: 6.2 %
INR PPP: 1.1 (ref 0.9–1.1)
KETONES UR STRIP.AUTO-MCNC: NEGATIVE MG/DL
LDH SERPL L TO P-CCNC: 274 U/L (ref 84–246)
LEUKOCYTE ESTERASE UR QL STRIP.AUTO: NEGATIVE
LYMPHOCYTES # BLD MANUAL: 0.7 X10*3/UL (ref 1.2–4.8)
LYMPHOCYTES NFR BLD MANUAL: 9.5 %
MAGNESIUM SERPL-MCNC: 1.14 MG/DL (ref 1.6–2.4)
MAGNESIUM SERPL-MCNC: 1.17 MG/DL (ref 1.6–2.4)
MCH RBC QN AUTO: 32.7 PG (ref 26–34)
MCH RBC QN AUTO: 33.4 PG (ref 26–34)
MCH RBC QN AUTO: 33.8 PG (ref 26–34)
MCHC RBC AUTO-ENTMCNC: 33.7 G/DL (ref 32–36)
MCHC RBC AUTO-ENTMCNC: 34 G/DL (ref 32–36)
MCHC RBC AUTO-ENTMCNC: 34.2 G/DL (ref 32–36)
MCV RBC AUTO: 97 FL (ref 80–100)
MCV RBC AUTO: 98 FL (ref 80–100)
MCV RBC AUTO: 99 FL (ref 80–100)
METAMYELOCYTES # BLD MANUAL: 0 X10*3/UL
METAMYELOCYTES NFR BLD MANUAL: 0 %
MONOCYTES # BLD MANUAL: 0.25 X10*3/UL (ref 0.1–1)
MONOCYTES NFR BLD MANUAL: 3.4 %
MUCOUS THREADS #/AREA URNS AUTO: ABNORMAL /LPF
MYELOCYTES # BLD MANUAL: 0 X10*3/UL
MYELOCYTES NFR BLD MANUAL: 0 %
NEUTROPHILS # BLD MANUAL: 6.45 X10*3/UL (ref 1.2–7.7)
NEUTS BAND # BLD MANUAL: 0 X10*3/UL (ref 0–0.7)
NEUTS BAND NFR BLD MANUAL: 0 %
NEUTS SEG # BLD MANUAL: 6.45 X10*3/UL (ref 1.2–7)
NEUTS SEG NFR BLD MANUAL: 87.1 %
NITRITE UR QL STRIP.AUTO: NEGATIVE
NRBC BLD MANUAL-RTO: 0 % (ref 0–0)
NRBC BLD-RTO: 0 /100 WBCS (ref 0–0)
P AXIS: 48 DEGREES
P OFFSET: 200 MS
P ONSET: 149 MS
PH UR STRIP.AUTO: 5.5 [PH]
PHOSPHATE SERPL-MCNC: 3 MG/DL (ref 2.5–4.9)
PHOSPHATE SERPL-MCNC: 3.9 MG/DL (ref 2.5–4.9)
PLASMA CELLS # BLD MANUAL: 0 X10*3/UL
PLASMA CELLS NFR BLD MANUAL: 0 %
PLATELET # BLD AUTO: 33 X10*3/UL (ref 150–450)
PLATELET # BLD AUTO: 36 X10*3/UL (ref 150–450)
PLATELET # BLD AUTO: 37 X10*3/UL (ref 150–450)
POTASSIUM SERPL-SCNC: 2.8 MMOL/L (ref 3.5–5.3)
POTASSIUM SERPL-SCNC: 3.2 MMOL/L (ref 3.5–5.3)
POTASSIUM SERPL-SCNC: 4.4 MMOL/L (ref 3.5–5.3)
PR INTERVAL: 142 MS
PROMYELOCYTES # BLD MANUAL: 0 X10*3/UL
PROMYELOCYTES NFR BLD MANUAL: 0 %
PROT SERPL-MCNC: 6.3 G/DL (ref 6.4–8.2)
PROT UR STRIP.AUTO-MCNC: NEGATIVE MG/DL
PROTHROMBIN TIME: 12.7 SECONDS (ref 9.8–12.4)
Q ONSET: 220 MS
QRS COUNT: 11 BEATS
QRS DURATION: 76 MS
QT INTERVAL: 442 MS
QTC CALCULATION(BAZETT): 448 MS
QTC FREDERICIA: 447 MS
R AXIS: 17 DEGREES
RBC # BLD AUTO: 2.72 X10*6/UL (ref 4–5.2)
RBC # BLD AUTO: 2.93 X10*6/UL (ref 4–5.2)
RBC # BLD AUTO: 3.08 X10*6/UL (ref 4–5.2)
RBC # UR STRIP.AUTO: ABNORMAL MG/DL
RBC #/AREA URNS AUTO: ABNORMAL /HPF
RBC MORPH BLD: ABNORMAL
RETICS #: 0.07 X10*6/UL (ref 0.02–0.08)
RETICS/RBC NFR AUTO: 2.3 % (ref 0.5–2)
SODIUM SERPL-SCNC: 140 MMOL/L (ref 136–145)
SODIUM SERPL-SCNC: 141 MMOL/L (ref 136–145)
SODIUM SERPL-SCNC: 142 MMOL/L (ref 136–145)
SP GR UR STRIP.AUTO: 1.01
SQUAMOUS #/AREA URNS AUTO: ABNORMAL /HPF
T AXIS: 174 DEGREES
T OFFSET: 441 MS
TACROLIMUS BLD-MCNC: 12.6 NG/ML
TARGETS BLD QL SMEAR: ABNORMAL
TOTAL CELLS COUNTED BLD: 116
UROBILINOGEN UR STRIP.AUTO-MCNC: NORMAL MG/DL
VARIANT LYMPHS # BLD MANUAL: 0 X10*3/UL (ref 0–0.5)
VARIANT LYMPHS NFR BLD: 0 %
VENTRICULAR RATE: 62 BPM
WBC # BLD AUTO: 6 X10*3/UL (ref 4.4–11.3)
WBC # BLD AUTO: 6.1 X10*3/UL (ref 4.4–11.3)
WBC # BLD AUTO: 7.4 X10*3/UL (ref 4.4–11.3)
WBC #/AREA URNS AUTO: ABNORMAL /HPF

## 2025-07-01 PROCEDURE — 2500000001 HC RX 250 WO HCPCS SELF ADMINISTERED DRUGS (ALT 637 FOR MEDICARE OP): Mod: SE

## 2025-07-01 PROCEDURE — 2500000002 HC RX 250 W HCPCS SELF ADMINISTERED DRUGS (ALT 637 FOR MEDICARE OP, ALT 636 FOR OP/ED): Mod: SE

## 2025-07-01 PROCEDURE — 80069 RENAL FUNCTION PANEL: CPT | Mod: CCI

## 2025-07-01 PROCEDURE — 82947 ASSAY GLUCOSE BLOOD QUANT: CPT

## 2025-07-01 PROCEDURE — 83735 ASSAY OF MAGNESIUM: CPT

## 2025-07-01 PROCEDURE — 85027 COMPLETE CBC AUTOMATED: CPT

## 2025-07-01 PROCEDURE — 93005 ELECTROCARDIOGRAM TRACING: CPT

## 2025-07-01 PROCEDURE — 2500000004 HC RX 250 GENERAL PHARMACY W/ HCPCS (ALT 636 FOR OP/ED): Mod: SE

## 2025-07-01 PROCEDURE — 81001 URINALYSIS AUTO W/SCOPE: CPT

## 2025-07-01 PROCEDURE — 36415 COLL VENOUS BLD VENIPUNCTURE: CPT

## 2025-07-01 PROCEDURE — 83880 ASSAY OF NATRIURETIC PEPTIDE: CPT

## 2025-07-01 PROCEDURE — 96368 THER/DIAG CONCURRENT INF: CPT

## 2025-07-01 PROCEDURE — 85610 PROTHROMBIN TIME: CPT

## 2025-07-01 PROCEDURE — 99285 EMERGENCY DEPT VISIT HI MDM: CPT | Mod: 25 | Performed by: STUDENT IN AN ORGANIZED HEALTH CARE EDUCATION/TRAINING PROGRAM

## 2025-07-01 PROCEDURE — 80197 ASSAY OF TACROLIMUS: CPT

## 2025-07-01 PROCEDURE — 96365 THER/PROPH/DIAG IV INF INIT: CPT

## 2025-07-01 PROCEDURE — 83615 LACTATE (LD) (LDH) ENZYME: CPT

## 2025-07-01 PROCEDURE — 96366 THER/PROPH/DIAG IV INF ADDON: CPT

## 2025-07-01 PROCEDURE — 82374 ASSAY BLOOD CARBON DIOXIDE: CPT

## 2025-07-01 PROCEDURE — 99285 EMERGENCY DEPT VISIT HI MDM: CPT | Performed by: STUDENT IN AN ORGANIZED HEALTH CARE EDUCATION/TRAINING PROGRAM

## 2025-07-01 PROCEDURE — 1200000002 HC GENERAL ROOM WITH TELEMETRY DAILY

## 2025-07-01 PROCEDURE — 99254 IP/OBS CNSLTJ NEW/EST MOD 60: CPT | Performed by: INTERNAL MEDICINE

## 2025-07-01 PROCEDURE — 85045 AUTOMATED RETICULOCYTE COUNT: CPT

## 2025-07-01 PROCEDURE — 84100 ASSAY OF PHOSPHORUS: CPT

## 2025-07-01 PROCEDURE — 80053 COMPREHEN METABOLIC PANEL: CPT

## 2025-07-01 PROCEDURE — 80048 BASIC METABOLIC PNL TOTAL CA: CPT | Mod: CCI

## 2025-07-01 PROCEDURE — 85007 BL SMEAR W/DIFF WBC COUNT: CPT

## 2025-07-01 PROCEDURE — 82330 ASSAY OF CALCIUM: CPT

## 2025-07-01 RX ORDER — SODIUM CHLORIDE, SODIUM LACTATE, POTASSIUM CHLORIDE, CALCIUM CHLORIDE 600; 310; 30; 20 MG/100ML; MG/100ML; MG/100ML; MG/100ML
100 INJECTION, SOLUTION INTRAVENOUS CONTINUOUS
Status: ACTIVE | OUTPATIENT
Start: 2025-07-01 | End: 2025-07-02

## 2025-07-01 RX ORDER — TACROLIMUS 0.5 MG/1
0.5 CAPSULE ORAL NIGHTLY
Status: DISCONTINUED | OUTPATIENT
Start: 2025-07-01 | End: 2025-07-01

## 2025-07-01 RX ORDER — POTASSIUM CHLORIDE 14.9 MG/ML
20 INJECTION INTRAVENOUS
Status: COMPLETED | OUTPATIENT
Start: 2025-07-01 | End: 2025-07-01

## 2025-07-01 RX ORDER — POLYETHYLENE GLYCOL 3350 17 G/17G
17 POWDER, FOR SOLUTION ORAL DAILY
Status: DISCONTINUED | OUTPATIENT
Start: 2025-07-01 | End: 2025-07-02 | Stop reason: HOSPADM

## 2025-07-01 RX ORDER — ONDANSETRON 4 MG/1
4 TABLET, FILM COATED ORAL EVERY 8 HOURS PRN
Status: DISCONTINUED | OUTPATIENT
Start: 2025-07-01 | End: 2025-07-01

## 2025-07-01 RX ORDER — ATOVAQUONE 750 MG/5ML
1500 SUSPENSION ORAL DAILY
Status: DISCONTINUED | OUTPATIENT
Start: 2025-07-01 | End: 2025-07-02 | Stop reason: HOSPADM

## 2025-07-01 RX ORDER — CALCIUM GLUCONATE 20 MG/ML
2 INJECTION, SOLUTION INTRAVENOUS ONCE
Status: COMPLETED | OUTPATIENT
Start: 2025-07-01 | End: 2025-07-01

## 2025-07-01 RX ORDER — TACROLIMUS 0.5 MG/1
0.5 CAPSULE ORAL
Status: DISCONTINUED | OUTPATIENT
Start: 2025-07-01 | End: 2025-07-02 | Stop reason: HOSPADM

## 2025-07-01 RX ORDER — ROSUVASTATIN CALCIUM 10 MG/1
10 TABLET, COATED ORAL NIGHTLY
Status: DISCONTINUED | OUTPATIENT
Start: 2025-07-01 | End: 2025-07-02 | Stop reason: HOSPADM

## 2025-07-01 RX ORDER — NALOXONE HYDROCHLORIDE 0.4 MG/ML
0.2 INJECTION, SOLUTION INTRAMUSCULAR; INTRAVENOUS; SUBCUTANEOUS EVERY 5 MIN PRN
Status: DISCONTINUED | OUTPATIENT
Start: 2025-07-01 | End: 2025-07-02 | Stop reason: HOSPADM

## 2025-07-01 RX ORDER — PANTOPRAZOLE SODIUM 40 MG/1
40 TABLET, DELAYED RELEASE ORAL
Status: DISCONTINUED | OUTPATIENT
Start: 2025-07-01 | End: 2025-07-02 | Stop reason: HOSPADM

## 2025-07-01 RX ORDER — ONDANSETRON HYDROCHLORIDE 2 MG/ML
4 INJECTION, SOLUTION INTRAVENOUS EVERY 8 HOURS PRN
Status: DISCONTINUED | OUTPATIENT
Start: 2025-07-01 | End: 2025-07-02 | Stop reason: HOSPADM

## 2025-07-01 RX ORDER — FUROSEMIDE 40 MG/1
40 TABLET ORAL DAILY
Status: DISCONTINUED | OUTPATIENT
Start: 2025-07-01 | End: 2025-07-02 | Stop reason: HOSPADM

## 2025-07-01 RX ORDER — HEPARIN SODIUM 5000 [USP'U]/ML
5000 INJECTION, SOLUTION INTRAVENOUS; SUBCUTANEOUS EVERY 8 HOURS SCHEDULED
Status: DISCONTINUED | OUTPATIENT
Start: 2025-07-01 | End: 2025-07-02 | Stop reason: HOSPADM

## 2025-07-01 RX ORDER — INSULIN LISPRO 100 [IU]/ML
0-5 INJECTION, SOLUTION INTRAVENOUS; SUBCUTANEOUS
Status: DISCONTINUED | OUTPATIENT
Start: 2025-07-01 | End: 2025-07-02 | Stop reason: HOSPADM

## 2025-07-01 RX ORDER — TACROLIMUS 1 MG/1
1 CAPSULE ORAL
Status: DISCONTINUED | OUTPATIENT
Start: 2025-07-02 | End: 2025-07-02 | Stop reason: HOSPADM

## 2025-07-01 RX ORDER — TACROLIMUS 1 MG/1
1 CAPSULE ORAL EVERY MORNING
Status: DISCONTINUED | OUTPATIENT
Start: 2025-07-01 | End: 2025-07-01

## 2025-07-01 RX ORDER — DEXTROSE 50 % IN WATER (D50W) INTRAVENOUS SYRINGE
12.5
Status: DISCONTINUED | OUTPATIENT
Start: 2025-07-01 | End: 2025-07-02 | Stop reason: HOSPADM

## 2025-07-01 RX ORDER — HYDRALAZINE HYDROCHLORIDE 25 MG/1
25 TABLET, FILM COATED ORAL 3 TIMES DAILY
Status: DISCONTINUED | OUTPATIENT
Start: 2025-07-01 | End: 2025-07-02 | Stop reason: HOSPADM

## 2025-07-01 RX ORDER — POTASSIUM CHLORIDE 20 MEQ/1
40 TABLET, EXTENDED RELEASE ORAL DAILY
Status: DISCONTINUED | OUTPATIENT
Start: 2025-07-01 | End: 2025-07-02 | Stop reason: HOSPADM

## 2025-07-01 RX ORDER — MYCOPHENOLATE MOFETIL 250 MG/1
1000 CAPSULE ORAL 2 TIMES DAILY
Status: DISCONTINUED | OUTPATIENT
Start: 2025-07-01 | End: 2025-07-02 | Stop reason: HOSPADM

## 2025-07-01 RX ORDER — MAGNESIUM SULFATE HEPTAHYDRATE 40 MG/ML
4 INJECTION, SOLUTION INTRAVENOUS ONCE
Status: COMPLETED | OUTPATIENT
Start: 2025-07-01 | End: 2025-07-01

## 2025-07-01 RX ORDER — TACROLIMUS 0.5 MG/1
0.5 CAPSULE ORAL
Status: DISCONTINUED | OUTPATIENT
Start: 2025-07-01 | End: 2025-07-01

## 2025-07-01 RX ORDER — POTASSIUM CHLORIDE 20 MEQ/1
40 TABLET, EXTENDED RELEASE ORAL ONCE
Status: COMPLETED | OUTPATIENT
Start: 2025-07-01 | End: 2025-07-01

## 2025-07-01 RX ORDER — ELTROMBOPAG 25 MG/1
12.5 TABLET, FILM COATED ORAL
Status: DISCONTINUED | OUTPATIENT
Start: 2025-07-01 | End: 2025-07-02 | Stop reason: HOSPADM

## 2025-07-01 RX ORDER — LANOLIN ALCOHOL/MO/W.PET/CERES
400 CREAM (GRAM) TOPICAL DAILY
Status: DISCONTINUED | OUTPATIENT
Start: 2025-07-01 | End: 2025-07-02 | Stop reason: HOSPADM

## 2025-07-01 RX ORDER — ONDANSETRON 4 MG/1
4 TABLET, FILM COATED ORAL EVERY 8 HOURS PRN
Status: DISCONTINUED | OUTPATIENT
Start: 2025-07-01 | End: 2025-07-02 | Stop reason: HOSPADM

## 2025-07-01 RX ORDER — MAGNESIUM SULFATE HEPTAHYDRATE 40 MG/ML
2 INJECTION, SOLUTION INTRAVENOUS ONCE
Status: DISCONTINUED | OUTPATIENT
Start: 2025-07-01 | End: 2025-07-02

## 2025-07-01 RX ORDER — CARVEDILOL 25 MG/1
25 TABLET ORAL
Status: DISCONTINUED | OUTPATIENT
Start: 2025-07-01 | End: 2025-07-02 | Stop reason: HOSPADM

## 2025-07-01 RX ORDER — ACYCLOVIR 400 MG/1
400 TABLET ORAL 2 TIMES DAILY
Status: DISCONTINUED | OUTPATIENT
Start: 2025-07-01 | End: 2025-07-02 | Stop reason: HOSPADM

## 2025-07-01 RX ORDER — DEXTROSE 50 % IN WATER (D50W) INTRAVENOUS SYRINGE
25
Status: DISCONTINUED | OUTPATIENT
Start: 2025-07-01 | End: 2025-07-02 | Stop reason: HOSPADM

## 2025-07-01 RX ORDER — MAGNESIUM SULFATE HEPTAHYDRATE 40 MG/ML
2 INJECTION, SOLUTION INTRAVENOUS ONCE
Status: DISCONTINUED | OUTPATIENT
Start: 2025-07-01 | End: 2025-07-01

## 2025-07-01 RX ORDER — CLOTRIMAZOLE 10 MG/1
10 LOZENGE ORAL
Status: DISCONTINUED | OUTPATIENT
Start: 2025-07-01 | End: 2025-07-02 | Stop reason: HOSPADM

## 2025-07-01 RX ORDER — OXYCODONE HYDROCHLORIDE 5 MG/1
5 TABLET ORAL EVERY 6 HOURS PRN
Refills: 0 | Status: DISCONTINUED | OUTPATIENT
Start: 2025-07-01 | End: 2025-07-02 | Stop reason: HOSPADM

## 2025-07-01 RX ORDER — ACETAMINOPHEN 325 MG/1
650 TABLET ORAL EVERY 4 HOURS PRN
Status: DISCONTINUED | OUTPATIENT
Start: 2025-07-01 | End: 2025-07-02 | Stop reason: HOSPADM

## 2025-07-01 RX ADMIN — POTASSIUM CHLORIDE 20 MEQ: 14.9 INJECTION, SOLUTION INTRAVENOUS at 08:39

## 2025-07-01 RX ADMIN — FUROSEMIDE 40 MG: 40 TABLET ORAL at 08:42

## 2025-07-01 RX ADMIN — POTASSIUM CHLORIDE 40 MEQ: 1500 TABLET, EXTENDED RELEASE ORAL at 05:09

## 2025-07-01 RX ADMIN — HYDRALAZINE HYDROCHLORIDE 25 MG: 25 TABLET ORAL at 20:38

## 2025-07-01 RX ADMIN — POTASSIUM CHLORIDE 20 MEQ: 14.9 INJECTION, SOLUTION INTRAVENOUS at 05:09

## 2025-07-01 RX ADMIN — MAGNESIUM SULFATE HEPTAHYDRATE 4 G: 40 INJECTION, SOLUTION INTRAVENOUS at 04:04

## 2025-07-01 RX ADMIN — MYCOPHENOLATE MOFETIL 1000 MG: 250 CAPSULE ORAL at 20:38

## 2025-07-01 RX ADMIN — TACROLIMUS 0.5 MG: 0.5 CAPSULE ORAL at 18:25

## 2025-07-01 RX ADMIN — CLOTRIMAZOLE 10 MG: 10 LOZENGE ORAL at 16:11

## 2025-07-01 RX ADMIN — ATOVAQUONE 1500 MG: 750 SUSPENSION ORAL at 08:43

## 2025-07-01 RX ADMIN — CARVEDILOL 25 MG: 12.5 TABLET, FILM COATED ORAL at 16:11

## 2025-07-01 RX ADMIN — DIBASIC SODIUM PHOSPHATE, MONOBASIC POTASSIUM PHOSPHATE AND MONOBASIC SODIUM PHOSPHATE 500 MG: 852; 155; 130 TABLET ORAL at 08:44

## 2025-07-01 RX ADMIN — POTASSIUM CHLORIDE 20 MEQ: 14.9 INJECTION, SOLUTION INTRAVENOUS at 11:42

## 2025-07-01 RX ADMIN — INSULIN LISPRO 1 UNITS: 100 INJECTION, SOLUTION INTRAVENOUS; SUBCUTANEOUS at 16:11

## 2025-07-01 RX ADMIN — HYDRALAZINE HYDROCHLORIDE 25 MG: 25 TABLET ORAL at 16:05

## 2025-07-01 RX ADMIN — CALCIUM GLUCONATE 2 G: 20 INJECTION, SOLUTION INTRAVENOUS at 03:06

## 2025-07-01 RX ADMIN — ACYCLOVIR 400 MG: 400 TABLET ORAL at 20:38

## 2025-07-01 RX ADMIN — POTASSIUM CHLORIDE 20 MEQ: 14.9 INJECTION, SOLUTION INTRAVENOUS at 02:53

## 2025-07-01 RX ADMIN — ACYCLOVIR 400 MG: 400 TABLET ORAL at 08:42

## 2025-07-01 RX ADMIN — TACROLIMUS 1 MG: 1 CAPSULE ORAL at 08:42

## 2025-07-01 RX ADMIN — HYDRALAZINE HYDROCHLORIDE 25 MG: 25 TABLET ORAL at 08:42

## 2025-07-01 RX ADMIN — CLOTRIMAZOLE 10 MG: 10 LOZENGE ORAL at 08:44

## 2025-07-01 RX ADMIN — ROSUVASTATIN CALCIUM 10 MG: 20 TABLET, FILM COATED ORAL at 20:38

## 2025-07-01 RX ADMIN — PREDNISONE 15 MG: 10 TABLET ORAL at 08:42

## 2025-07-01 RX ADMIN — LETERMOVIR 480 MG: 480 TABLET, FILM COATED ORAL at 08:43

## 2025-07-01 RX ADMIN — MYCOPHENOLATE MOFETIL 1000 MG: 250 CAPSULE ORAL at 08:40

## 2025-07-01 RX ADMIN — ONDANSETRON HYDROCHLORIDE 4 MG: 4 TABLET, FILM COATED ORAL at 08:42

## 2025-07-01 RX ADMIN — SODIUM CHLORIDE, SODIUM LACTATE, POTASSIUM CHLORIDE, AND CALCIUM CHLORIDE 100 ML/HR: .6; .31; .03; .02 INJECTION, SOLUTION INTRAVENOUS at 04:04

## 2025-07-01 RX ADMIN — DIBASIC SODIUM PHOSPHATE, MONOBASIC POTASSIUM PHOSPHATE AND MONOBASIC SODIUM PHOSPHATE 500 MG: 852; 155; 130 TABLET ORAL at 20:38

## 2025-07-01 ASSESSMENT — PAIN - FUNCTIONAL ASSESSMENT: PAIN_FUNCTIONAL_ASSESSMENT: 0-10

## 2025-07-01 ASSESSMENT — PAIN DESCRIPTION - ORIENTATION: ORIENTATION: RIGHT

## 2025-07-01 ASSESSMENT — PAIN SCALES - GENERAL: PAINLEVEL_OUTOF10: 3

## 2025-07-01 NOTE — TELEPHONE ENCOUNTER
Per Dr Melvin, patient to be directed to ED due to lab abnormalities including K, Mag, plts.     Spoke with patient's son - he agreed that family will bring patient to [facility]. Dr Santiago aware. Report called to ED - spoke with Dr Davila.

## 2025-07-01 NOTE — PROGRESS NOTES
Pharmacy Medication History Review    Luis Panda is a 60 y.o. female admitted for Thrombocytopenia. Pharmacy reviewed the patient's [id number] medications and allergies for accuracy.    The list below reflects the updated PTA list.   Prior to Admission Medications   Prescriptions Last Dose Informant   TRUEplus Lancets 33 gauge misc  Child   acyclovir (Zovirax) 400 mg tablet 7/1/2025 Child   Sig: Take 1 tablet (400 mg) by mouth 2 times a day.   aspirin 81 mg EC tablet Not Taking Child   Sig: Take 1 tablet (81 mg) by mouth once daily.   Patient not taking: Reported on 7/1/2025   atovaquone (Mepron) 750 mg/5 mL suspension Unknown Child   Sig: Take 10 mL (1,500 mg) by mouth once daily.   carvedilol (Coreg) 25 mg tablet 7/1/2025 Child   Sig: Take 1 tablet (25 mg) by mouth 2 times daily (morning and late afternoon).   clotrimazole (Mycelex) 10 mg elissa 7/1/2025 Child   Sig: Use 1 tablet (10 mg) in the mouth or throat 3 times daily (morning, midday, late afternoon).   eltrombopag olamine (Promacta) 12.5 mg tablet Not Taking Child   Sig: Take 1 tablet (12.5 mg) by mouth once daily in the morning. Take before meals. Take on an empty stomach, 1 hour before or 2 hours afer a meal.   Patient not taking: Reported on 7/1/2025   furosemide (Lasix) 40 mg tablet Unknown Child   Sig: Take 1 tablet (40 mg) by mouth once daily.   gabapentin (Neurontin) 100 mg capsule Unknown Child   Sig: Take 1 capsule (100 mg) by mouth once daily.   hydrALAZINE (Apresoline) 25 mg tablet 7/1/2025 Child   Sig: Take 1 tablet (25 mg) by mouth 3 times a day.   insulin NPH, Isophane, (HumuLIN N,NovoLIN N) 100 unit/mL (3 mL) pen Unknown Child   Sig: Inject 12 Units under the skin once every 24 hours. Take as directed per insulin instructions.   insulin lispro (HumaLOG) 100 unit/mL pen Unknown Child   Sig: Inject 0-10 Units under the skin 3 times a day before meals. Take as directed per insulin instructions.  Hypoglycemia protocol Call LIP  "unit(s) if Blood Glucose is between 0 - 70 mg/d   0 unit(s) if Blood glucose is between    1 unit(s) if Blood glucose is between 201-250   2 unit(s) if Blood glucose is between 251-300   3 unit(s) if Bloodglucose is between 301-350   4 unit(s) if Blood glucose is between 351-400   If blood glucose is greater than 400 mg/dL, give max insulin per sliding scale AND then contact provider.   letermovir (Prevymis) 480 mg tablet Unknown Child   Sig: Take 1 tablet (480 mg) by mouth once daily.   methocarbamol (Robaxin) 500 mg tablet     Sig: Take 1 tablet (500 mg) by mouth every 6 hours if needed for muscle spasms for up to 5 days.   mycophenolate (Cellcept) 250 mg capsule 7/1/2025 Child   Sig: Take 4 capsules (1,000 mg) by mouth 2 times a day.   pantoprazole (ProtoNix) 40 mg EC tablet  Child   Sig: Take 1 tablet (40 mg) by mouth once daily in the morning. Take before meals. Do not crush, chew, or split.   pen needle, diabetic 32 gauge x 5/32\" needle  Child   Sig: Use 1 each 5 times a day.   polyethylene glycol (Glycolax, Miralax) 17 gram/dose powder  Child   Sig: Mix 17 g of powder and drink once daily.   predniSONE (Deltasone) 5 mg tablet  Child   Sig: Take 3 tablets (15 mg) by mouth once daily.   rosuvastatin (Crestor) 10 mg tablet Unknown Child   Sig: Take 1 tablet (10 mg) by mouth once daily at bedtime.   sod phos di, mono-K phos mono (K Phos Neutral) tablet  Child   Sig: Take 2 tablets (500 mg) by mouth 2 times a day.   tacrolimus (Prograf) 0.5 mg capsule  Child   Sig: Take 2 capsules (1 mg) by mouth once daily in the morning AND 1 capsule (0.5 mg) once daily at bedtime.   traZODone (Desyrel) 50 mg tablet  Child   Sig: Take 1 tablet (50 mg) by mouth once daily at bedtime.      Facility-Administered Medications: None        The list below reflects the updated allergy list. Please review each documented allergy for additional clarification and justification.  Allergies  Reviewed by Jon Martino PharmD on 7/1/2025 "        Severity Reactions Comments    Amlodipine Not Specified Other LEG SWELLING    Oxycodone Not Specified Other Dizziness, weakness            Patient accepts M2B at discharge.     Sources used to complete the med history include:    [facility]  Pharmacy dispense history  Child Son Moderate historian  Chart Review  Care Everywhere  6/16 Discharge Note   6/25 Transplant Office Visit, Kb You MD    Below are additional concerns with the patient's PTA list.  Patient’s son is at the bedside and provided a list of all her current medications. He also brought the medications on hand and was able to confirm the doses and directions.      Medications ADDED:  none  Medications CHANGED:  Aspirin to not taking (currently on hold)  Promacta to not taking (currently on hold)  Medications REMOVED:   none    Jon Martino PharmD  Transitions of Care Pharmacist  [facility]  Please reach out via Secure Chat for questions, or if no response call [facility] or [facility]

## 2025-07-01 NOTE — H&P
Transplant Surgery History and Physical    Subjective   Chief Complaint/Reason for Admission: DDKT 6/10/2025 presenting hypokalemia/thrombocytopenia on outpatient labs    HPI:    Luis Panda is a 60 y.o. year old female with a PMHx significant for ESRD 2/2 PCKD, now s/p DDKT 6/10/2025, DM, ITP, now presenting to [facility] ED after outpatient labs demonstrating persistent hypokalemia, as well as thrombocytopenia.    At bedside, patient awake, alert, in no apparent distress.  Abdomen nontender, surgical incision well-approximated with staples in place, no significant discharge.  Patient denies episodes of nausea, vomiting, diarrhea.  Patient reports making good urine, approximately 1 to 2 L daily.  Patient maintained on immunosuppressive regimen including TAC 1-0.5 , CellCept 750 twice daily, prednisone 15 daily.    Labs obtained on admission demonstrating thrombocytopenia to 37, as well as hypokalemia to 2.8    Prophylaxis including acyclovir, atovaquone, letermovir.  Antihypertensive regimen includes Coreg, hydralazine.    Received repletion of 4 g magnesium, 40 potassium IV, 2 g calcium gluconate in ED.    Patient denies fevers, chills, sleepiness, nausea, vomiting, diarrhea, dizziness, bleeding, discharge from incision.      PMH:  Medical History[1]  PSH:  Surgical History[2]  Soc Hx:  Social History     Socioeconomic History    Marital status:      Spouse name: Not on file    Number of children: Not on file    Years of education: Not on file    Highest education level: Not on file   Occupational History    Not on file   Tobacco Use    Smoking status: Former     Current packs/day: 0.00     Types: Cigarettes     Quit date:      Years since quittin.5     Passive exposure: Never    Smokeless tobacco: Never   Substance and Sexual Activity    Alcohol use: Not Currently    Drug use: Never    Sexual activity: Not on file   Other Topics Concern    Not on file   Social History Narrative    Not on file      Social Drivers of Health     Financial Resource Strain: Low Risk  (6/11/2025)    Overall Financial Resource Strain (CARDIA)     Difficulty of Paying Living Expenses: Not hard at all   Food Insecurity: No Food Insecurity (6/10/2025)    Hunger Vital Sign     Worried About Running Out of Food in the Last Year: Never true     Ran Out of Food in the Last Year: Never true   Transportation Needs: No Transportation Needs (6/11/2025)    PRAPARE - Transportation     Lack of Transportation (Medical): No     Lack of Transportation (Non-Medical): No   Physical Activity: Not on file   Stress: Not on file   Social Connections: Not on file   Intimate Partner Violence: Not At Risk (6/10/2025)    Humiliation, Afraid, Rape, and Kick questionnaire     Fear of Current or Ex-Partner: No     Emotionally Abused: No     Physically Abused: No     Sexually Abused: No   Housing Stability: Low Risk  (6/11/2025)    Housing Stability Vital Sign     Unable to Pay for Housing in the Last Year: No     Number of Times Moved in the Last Year: 0     Homeless in the Last Year: No     Fam Hx:  Family History[3]   Allergies:  RX Allergies[4]  Current Medications:  Medications Ordered Prior to Encounter[5]      Objective   Vitals:  Visit Vitals  /75   Pulse 62   Temp 36.9 °C (98.4 °F) (Temporal)   Resp 16       Physical Exam:  GEN: No acute distress. Alert, awake and conversive.  HEENT: Sclera anicteric. Moist mucous membranes.  RESP: Breathing non-labored, equal chest rise. On RA.  CV: Regular rate, normotensive  GI: Abdomen soft, nondistended, nontender.  Right lower quadrant surgical incision well-approximated with staples, no significant discharge, no erythema.  : Voiding spontaneously.  MSK: No gross deformities. Moves all extremities spontaneously.  NEURO: Alert and oriented x3. No focal deficits.  PSYCH: Appropriate mood and affect.  SKIN: No rashes or lesions.    Labs:  No results found for this or any previous visit (from the past 24  hours).     Assessment   ASSESSMENT  Luis Panda is a 60 y.o. female with past medical history DM, ITP, ESRD 2/2 PCKD, now s/p DDKT 6/10/2025, presenting with hypomagnesemia, hypokalemia, thrombocytopenia on outpatient labs.    Patient to be admitted to transplant service for further workup.    Will resume home immunosuppressants, maintain n.p.o. until a.m. home insulin at half dose given n.p.o. status.    PLAN:    - Admit to transplant service under observation  - Continue home immunosuppression, currently Tac 1-0.5, CellCept 750 twice daily, prednisone 15  - Continue home prophylaxis-atovaquone 1500 Mg, acyclovir 400 Mg, letermovir 480 Mg  - BP control hydralazine 25 3 times daily.  Home Coreg 25 twice daily held  - Home Lasix 40 Mg p.o. resumed  - mIVF LR @ 100    Discussed with attending Dr. Jack Cool MD  PGY-2 General Surgery  Transplant Surgery [id number]         [1]   Past Medical History:  Diagnosis Date    CKD (chronic kidney disease)     Diabetes mellitus (Multi)     HLD (hyperlipidemia)     Hypertension     PONV (postoperative nausea and vomiting)    [2]   Past Surgical History:  Procedure Laterality Date    AV FISTULA PLACEMENT Left     approx 2019    MR HEAD ANGIO WO IV CONTRAST  08/07/2023    MR HEAD ANGIO WO IV CONTRAST 8/7/2023 [facility] [id number] MRI   [3] No family history on file.  [4]   Allergies  Allergen Reactions    Amlodipine Other     LEG SWELLING    Oxycodone Other     Dizziness, weakness   [5]   No current facility-administered medications on file prior to encounter.     Current Outpatient Medications on File Prior to Encounter   Medication Sig Dispense Refill    acyclovir (Zovirax) 400 mg tablet Take 1 tablet (400 mg) by mouth 2 times a day. 60 tablet 2    [Paused] aspirin 81 mg EC tablet Take 1 tablet (81 mg) by mouth once daily.      atovaquone (Mepron) 750 mg/5 mL suspension Take 10 mL (1,500 mg) by mouth once daily. 300 mL 5    carvedilol (Coreg) 25 mg tablet Take 1 tablet  (25 mg) by mouth 2 times daily (morning and late afternoon).      clotrimazole (Mycelex) 10 mg marcial Use 1 tablet (10 mg) in the mouth or throat 3 times daily (morning, midday, late afternoon). 90 Marcial 2    [Paused] eltrombopag olamine (Promacta) 12.5 mg tablet Take 1 tablet (12.5 mg) by mouth once daily in the morning. Take before meals. Take on an empty stomach, 1 hour before or 2 hours afer a meal. 30 tablet 11    furosemide (Lasix) 40 mg tablet Take 1 tablet (40 mg) by mouth once daily. 30 tablet 11    gabapentin (Neurontin) 100 mg capsule Take 1 capsule (100 mg) by mouth once daily. 30 capsule 11    hydrALAZINE (Apresoline) 25 mg tablet Take 1 tablet (25 mg) by mouth 3 times a day. 90 tablet 11    insulin lispro (HumaLOG) 100 unit/mL pen Inject 0-10 Units under the skin 3 times a day before meals. Take as directed per insulin instructions.  Hypoglycemia protocol Call LIP unit(s) if Blood Glucose is between 0 - 70 mg/d   0 unit(s) if Blood glucose is between    1 unit(s) if Blood glucose is between 201-250   2 unit(s) if Blood glucose is between 251-300   3 unit(s) if Bloodglucose is between 301-350   4 unit(s) if Blood glucose is between 351-400   If blood glucose is greater than 400 mg/dL, give max insulin per sliding scale AND then contact provider.      insulin NPH, Isophane, (HumuLIN N,NovoLIN N) 100 unit/mL (3 mL) pen Inject 12 Units under the skin once every 24 hours. Take as directed per insulin instructions. 15 mL 11    letermovir (Prevymis) 480 mg tablet Take 1 tablet (480 mg) by mouth once daily. 30 tablet 2    methocarbamol (Robaxin) 500 mg tablet Take 1 tablet (500 mg) by mouth every 6 hours if needed for muscle spasms for up to 5 days. 20 tablet 0    mycophenolate (Cellcept) 250 mg capsule Take 4 capsules (1,000 mg) by mouth 2 times a day. 240 capsule 11    pantoprazole (ProtoNix) 40 mg EC tablet Take 1 tablet (40 mg) by mouth once daily in the morning. Take before meals. Do not crush,  "chew, or split. 30 tablet 0    pen needle, diabetic 32 gauge x 5/32\" needle Use 1 each 5 times a day. 100 each 0    polyethylene glycol (Glycolax, Miralax) 17 gram/dose powder Mix 17 g of powder and drink once daily. 238 g 0    predniSONE (Deltasone) 5 mg tablet Take 3 tablets (15 mg) by mouth once daily. 90 tablet 11    rosuvastatin (Crestor) 10 mg tablet Take 1 tablet (10 mg) by mouth once daily at bedtime. 90 tablet 3    sod phos di, mono-K phos mono (K Phos Neutral) tablet Take 2 tablets (500 mg) by mouth 2 times a day. 120 tablet 1    tacrolimus (Prograf) 0.5 mg capsule Take 2 capsules (1 mg) by mouth once daily in the morning AND 1 capsule (0.5 mg) once daily at bedtime. 90 capsule 11    traZODone (Desyrel) 50 mg tablet Take 1 tablet (50 mg) by mouth once daily at bedtime.      TRUEplus Lancets 33 gauge misc       [DISCONTINUED] mycophenolate (Cellcept) 250 mg capsule Take 3 capsules (750 mg) by mouth 2 times a day. 180 capsule 11    [DISCONTINUED] predniSONE (Deltasone) 5 mg tablet Take 4 tablets (20 mg) by mouth once daily. 120 tablet 11    [DISCONTINUED] simvastatin (Zocor) 20 mg tablet Take 1 tablet (20 mg) by mouth once daily at bedtime.      [DISCONTINUED] tacrolimus (Prograf) 0.5 mg capsule Take 1 capsule (0.5 mg) by mouth 2 times a day. 60 capsule 11     "

## 2025-07-01 NOTE — ED TRIAGE NOTES
Per pt son she received a phone call from her doctor telling her to come to the emergency room because her electrolytes are off. Pt had a kidney transplant on 6/10/25.

## 2025-07-01 NOTE — CONSULTS
INPATIENT INITIAL TRANSPLANT NEPHROLOGY CONSULT          SERVICE DATE: 7/1/2025   SERVICE TIME:  5:08 PM    REASON FOR CONSULT:  Immunosuppressive medication management and nephrology related issues.    REQUESTING PHYSICIAN: Ellyn Lowery MD;*  PRIMARY CARE PHYSICIAN: Francisco Javier Parmar DO    ADMISSION DIAGNOSIS:   1. Thrombocytopenia    2. Hypokalemia        TRANSPLANT DATE: 6/10/2025 (Kidney)    BLOOD TYPE: B    HPI:    Ms. Panda is a 60 y.o. female with past medical history significant for significant for ESRD 2/2 PCKD, now s/p DDKT 6/10/2025, DM, ITP, now presenting to [facility] ED after outpatient labs demonstrating persistent hypokalemia, as well as thrombocytopenia.     At bedside, patient awake, alert, in no apparent distress.  Abdomen nontender, surgical incision well-approximated with staples in place, no significant discharge.  Patient denies episodes of nausea, vomiting, diarrhea.  Patient reports making good urine, approximately 1 to 2 L daily.  Patient maintained on immunosuppressive regimen including TAC 1-0.5 , CellCept 750 twice daily, prednisone 15 daily.     Labs obtained on admission demonstrating thrombocytopenia to 37, as well as hypokalemia to 2.8     Prophylaxis including acyclovir, atovaquone, letermovir.  Antihypertensive regimen includes Coreg, hydralazine.     Received repletion of 4 g magnesium, 40 potassium IV, 2 g calcium gluconate in ED.     Patient denies fevers, chills, sleepiness, nausea, vomiting, diarrhea, dizziness, bleeding, discharge from incision.    REVIEW OF SYSTEM:  Review of system was done system by system (10/14). Apart from HPI, other symptoms were negative.    PAST MEDICAL HISTORY:  Medical History[1]     PAST SURGICAL HISTORY:  Surgical History[2]     SOCIAL HISTORY:  Social History     Socioeconomic History    Marital status:      Spouse name: Not on file    Number of children: Not on file    Years of education: Not on file    Highest  education level: Not on file   Occupational History    Not on file   Tobacco Use    Smoking status: Former     Current packs/day: 0.00     Types: Cigarettes     Quit date: 1985     Years since quittin.5     Passive exposure: Never    Smokeless tobacco: Never   Substance and Sexual Activity    Alcohol use: Not Currently    Drug use: Never    Sexual activity: Not on file   Other Topics Concern    Not on file   Social History Narrative    Not on file     Social Drivers of Health     Financial Resource Strain: Low Risk  (2025)    Overall Financial Resource Strain (CARDIA)     Difficulty of Paying Living Expenses: Not hard at all   Food Insecurity: No Food Insecurity (6/10/2025)    Hunger Vital Sign     Worried About Running Out of Food in the Last Year: Never true     Ran Out of Food in the Last Year: Never true   Transportation Needs: No Transportation Needs (2025)    PRAPARE - Transportation     Lack of Transportation (Medical): No     Lack of Transportation (Non-Medical): No   Physical Activity: Not on file   Stress: Not on file   Social Connections: Not on file   Intimate Partner Violence: Not At Risk (6/10/2025)    Humiliation, Afraid, Rape, and Kick questionnaire     Fear of Current or Ex-Partner: No     Emotionally Abused: No     Physically Abused: No     Sexually Abused: No   Housing Stability: Low Risk  (2025)    Housing Stability Vital Sign     Unable to Pay for Housing in the Last Year: No     Number of Times Moved in the Last Year: 0     Homeless in the Last Year: No       FAMILY HISTORY:  Family History[3]    MEDICATION LIST:  acyclovir, 400 mg, BID  atovaquone, 1,500 mg, Daily  carvedilol, 25 mg, BID  clotrimazole, 10 mg, TID  eltrombopag olamine, 12.5 mg, Daily before breakfast  furosemide, 40 mg, Daily  [Held by provider] heparin (porcine), 5,000 Units, q8h NARGIS  hydrALAZINE, 25 mg, TID  insulin lispro, 0-5 Units, TID AC  insulin NPH (Isophane), 6 Units, q24h NARGIS  letermovir, 480 mg,  "Daily  magnesium sulfate, 2 g, Once  mycophenolate, 1,000 mg, BID  pantoprazole, 40 mg, Daily before breakfast  polyethylene glycol, 17 g, Daily  predniSONE, 15 mg, Daily  rosuvastatin, 10 mg, Nightly  sod phos di, mono-K phos mono, 500 mg, BID  [START ON 7/2/2025] tacrolimus, 1 mg, Daily   And  tacrolimus, 0.5 mg, Daily      lactated Ringer's, Last Rate: 100 mL/hr (07/01/25 1400)      acetaminophen, 650 mg, q4h PRN  dextrose, 12.5 g, q15 min PRN  dextrose, 25 g, q15 min PRN  glucagon, 1 mg, q15 min PRN  glucagon, 1 mg, q15 min PRN  naloxone, 0.2 mg, q5 min PRN  ondansetron, 4 mg, q8h PRN  ondansetron, 4 mg, q8h PRN  oxyCODONE, 5 mg, q6h PRN        ALLERGY:  Allergies[4]    PHYCISCAL EXAMINATION:  Visit Vitals  /71   Pulse 71   Temp 36.9 °C (98.4 °F) (Temporal)   Resp 16   Ht 1.651 m (5' 5\")   Wt 67.6 kg (149 lb)   SpO2 98%   BMI 24.79 kg/m²   OB Status Postmenopausal   Smoking Status Former   BSA 1.76 m²        06/29 1900 - 07/01 0659  In: 200   Out: -        General Appearance - NAD, Good speech, oriented and alert  HEENT - Supple. Not pale. No jaundice. No cervical lymphadenopathy. Pharynx and tonsils are not injected.  CVS - RRR. Normal S1/S2. No murmur, click , rub or gallop  Lungs- clear to auscultation bilaterally  Abdomen - soft , not tender, no guarding, no rigidity. No hepatosplenomegaly. Normal bowel sounds. No masses and ascites. S/P Kidney transplant .  Transplanted kidney is not tender.   Musculoskeletal /Extremities - no edema. Full ROM. No joint tenderness.   Neuro/Psych - appropriate mood and affect. Motor power V/V all extremities. CN I -XII were grossly intact.  Skin - No visible rash    LABS:  Results for orders placed or performed during the hospital encounter of 07/01/25 (from the past 24 hours)   CBC and Auto Differential   Result Value Ref Range    WBC 7.4 4.4 - 11.3 x10*3/uL    nRBC 0.0 0.0 - 0.0 /100 WBCs    RBC 3.08 (L) 4.00 - 5.20 x10*6/uL    Hemoglobin 10.4 (L) 12.0 - 16.0 g/dL    " Hematocrit 30.4 (L) 36.0 - 46.0 %    MCV 99 80 - 100 fL    MCH 33.8 26.0 - 34.0 pg    MCHC 34.2 32.0 - 36.0 g/dL    RDW 17.0 (H) 11.5 - 14.5 %    Platelets 37 (LL) 150 - 450 x10*3/uL    Immature Granulocytes %, Automated 0.3 0.0 - 0.9 %    Immature Granulocytes Absolute, Automated 0.02 0.00 - 0.70 x10*3/uL   Comprehensive metabolic panel   Result Value Ref Range    Glucose 157 (H) 74 - 99 mg/dL    Sodium 142 136 - 145 mmol/L    Potassium 2.8 (LL) 3.5 - 5.3 mmol/L    Chloride 98 98 - 107 mmol/L    Bicarbonate 31 21 - 32 mmol/L    Anion Gap 16 10 - 20 mmol/L    Urea Nitrogen 26 (H) 6 - 23 mg/dL    Creatinine 1.62 (H) 0.50 - 1.05 mg/dL    eGFR 36 (L) >60 mL/min/1.73m*2    Calcium 8.0 (L) 8.6 - 10.6 mg/dL    Albumin 3.7 3.4 - 5.0 g/dL    Alkaline Phosphatase 64 33 - 136 U/L    Total Protein 6.3 (L) 6.4 - 8.2 g/dL    AST 27 9 - 39 U/L    Bilirubin, Total 1.1 0.0 - 1.2 mg/dL    ALT 26 7 - 45 U/L   Magnesium   Result Value Ref Range    Magnesium 1.17 (L) 1.60 - 2.40 mg/dL   Phosphorus   Result Value Ref Range    Phosphorus 3.9 2.5 - 4.9 mg/dL   B-type natriuretic peptide   Result Value Ref Range     (H) 0 - 99 pg/mL   LDH, Lactate dehydrogenase   Result Value Ref Range     (H) 84 - 246 U/L   Reticulocytes   Result Value Ref Range    Retic % 2.3 (H) 0.5 - 2.0 %    Retic Absolute 0.072 0.018 - 0.083 x10*6/uL    Reticulocyte Hemoglobin 36 28 - 38 pg    Immature Retic fraction 6.2 <=16.0 %   Manual Differential   Result Value Ref Range    Neutrophils %, Manual 87.1 40.0 - 80.0 %    Bands %, Manual 0.0 0.0 - 5.0 %    Lymphocytes %, Manual 9.5 13.0 - 44.0 %    Monocytes %, Manual 3.4 2.0 - 10.0 %    Eosinophils %, Manual 0.0 0.0 - 6.0 %    Basophils %, Manual 0.0 0.0 - 2.0 %    Atypical Lymphocytes %, Manual 0.0 0.0 - 2.0 %    Metamyelocytes %, Manual 0.0 0.0 - 0.0 %    Myelocytes %, Manual 0.0 0.0 - 0.0 %    Plasma Cells %, Manual 0.0 0.00 - 0.00 %    Promyelocytes %, Manual 0.0 0.0 - 0.0 %    Blasts %, Manual 0.0  0.0 - 0.0 %    Seg Neutrophils Absolute, Manual 6.45 1.20 - 7.00 x10*3/uL    Bands Absolute, Manual 0.00 0.00 - 0.70 x10*3/uL    Lymphocytes Absolute, Manual 0.70 (L) 1.20 - 4.80 x10*3/uL    Monocytes Absolute, Manual 0.25 0.10 - 1.00 x10*3/uL    Eosinophils Absolute, Manual 0.00 0.00 - 0.70 x10*3/uL    Basophils Absolute, Manual 0.00 0.00 - 0.10 x10*3/uL    Atypical Lymphs Absolute, Manual 0.00 0.00 - 0.50 x10*3/uL    Metamyelocytes Absolute, Manual 0.00 0.00 - 0.00 x10*3/uL    Myelocytes Absolute, Manual 0.00 0.00 - 0.00 x10*3/uL    Plasma Cells Absolute, Manual 0.00 0.00 - 0.00 x10*3/uL    Promyelocytes Absolute, Manual 0.00 0.00 - 0.00 x10*3/uL    Blasts Absolute, Manual 0.00 0.00 - 0.00 x10*3/uL    Total Cells Counted 116     Neutrophils Absolute, Manual 6.45 1.20 - 7.70 x10*3/uL    Manual nRBC per 100 Cells 0.0 0.0 - 0.0 %    RBC Morphology See Below     Target Cells Few    Urinalysis with Reflex Culture and Microscopic   Result Value Ref Range    Color, Urine Light-Yellow Light-Yellow, Yellow, Dark-Yellow    Appearance, Urine Clear Clear    Specific Gravity, Urine 1.011 1.005 - 1.035    pH, Urine 5.5 5.0, 5.5, 6.0, 6.5, 7.0, 7.5, 8.0    Protein, Urine NEGATIVE NEGATIVE, 10 (TRACE), 20 (TRACE) mg/dL    Glucose, Urine 150 (2+) (A) Normal mg/dL    Blood, Urine 0.03 (TRACE) (A) NEGATIVE mg/dL    Ketones, Urine NEGATIVE NEGATIVE mg/dL    Bilirubin, Urine NEGATIVE NEGATIVE mg/dL    Urobilinogen, Urine Normal Normal mg/dL    Nitrite, Urine NEGATIVE NEGATIVE    Leukocyte Esterase, Urine NEGATIVE NEGATIVE   Extra Urine Gray Tube   Result Value Ref Range    Extra Tube     Urinalysis Microscopic   Result Value Ref Range    WBC, Urine 1-5 1-5, NONE /HPF    RBC, Urine 6-10 (A) NONE, 1-2, 3-5 /HPF    Squamous Epithelial Cells, Urine 1-9 (SPARSE) Reference range not established. /HPF    Mucus, Urine FEW Reference range not established. /LPF    Hyaline Casts, Urine OCCASIONAL (A) NONE /LPF   ECG 12 Lead   Result Value Ref  Range    Ventricular Rate 62 BPM    Atrial Rate 62 BPM    VT Interval 142 ms    QRS Duration 76 ms    QT Interval 442 ms    QTC Calculation(Bazett) 448 ms    P Axis 48 degrees    R Axis 17 degrees    T Axis 174 degrees    QRS Count 11 beats    Q Onset 220 ms    P Onset 149 ms    P Offset 200 ms    T Offset 441 ms    QTC Fredericia 447 ms   CBC   Result Value Ref Range    WBC 6.1 4.4 - 11.3 x10*3/uL    nRBC 0.0 0.0 - 0.0 /100 WBCs    RBC 2.72 (L) 4.00 - 5.20 x10*6/uL    Hemoglobin 8.9 (L) 12.0 - 16.0 g/dL    Hematocrit 26.4 (L) 36.0 - 46.0 %    MCV 97 80 - 100 fL    MCH 32.7 26.0 - 34.0 pg    MCHC 33.7 32.0 - 36.0 g/dL    RDW 17.4 (H) 11.5 - 14.5 %    Platelets 36 (LL) 150 - 450 x10*3/uL   Basic metabolic panel   Result Value Ref Range    Glucose 140 (H) 74 - 99 mg/dL    Sodium 141 136 - 145 mmol/L    Potassium 3.2 (L) 3.5 - 5.3 mmol/L    Chloride 100 98 - 107 mmol/L    Bicarbonate 31 21 - 32 mmol/L    Anion Gap 13 10 - 20 mmol/L    Urea Nitrogen 25 (H) 6 - 23 mg/dL    Creatinine 1.51 (H) 0.50 - 1.05 mg/dL    eGFR 39 (L) >60 mL/min/1.73m*2    Calcium 8.5 (L) 8.6 - 10.6 mg/dL   Coagulation Screen   Result Value Ref Range    Protime 12.7 (H) 9.8 - 12.4 seconds    INR 1.1 0.9 - 1.1    aPTT 23 (L) 26 - 36 seconds   Magnesium   Result Value Ref Range    Magnesium 1.14 (L) 1.60 - 2.40 mg/dL   Tacrolimus level   Result Value Ref Range    Tacrolimus  12.6 <=15.0 ng/mL   POCT GLUCOSE   Result Value Ref Range    POCT Glucose 96 74 - 99 mg/dL   SST TOP   Result Value Ref Range    Extra Tube Hold for add-ons.    POCT GLUCOSE   Result Value Ref Range    POCT Glucose 121 (H) 74 - 99 mg/dL   CBC   Result Value Ref Range    WBC 6.0 4.4 - 11.3 x10*3/uL    nRBC 0.0 0.0 - 0.0 /100 WBCs    RBC 2.93 (L) 4.00 - 5.20 x10*6/uL    Hemoglobin 9.8 (L) 12.0 - 16.0 g/dL    Hematocrit 28.8 (L) 36.0 - 46.0 %    MCV 98 80 - 100 fL    MCH 33.4 26.0 - 34.0 pg    MCHC 34.0 32.0 - 36.0 g/dL    RDW 17.6 (H) 11.5 - 14.5 %    Platelets 33 (LL) 150 - 450  x10*3/uL   POCT GLUCOSE   Result Value Ref Range    POCT Glucose 186 (H) 74 - 99 mg/dL        ASSESSMENT AND PLAN:  Ms. Panda is a 60 y.o. female who underwent a kidney transplant surgery on [unfilled] and was hospitalized for:    Principal Problem:    Thrombocytopenia      1. ESRD S/P Kidney transplant.   - Renal allograft function:   Lab Results   Component Value Date    CREATININE 1.51 (H) 07/01/2025     Estimated Creatinine Clearance: 34.8 mL/min (A) (by C-G formula based on SCr of 1.51 mg/dL (H)).    Intake/Output Summary (Last 24 hours) at 7/1/2025 1708  Last data filed at 7/1/2025 0738  Gross per 24 hour   Intake 300 ml   Output --   Net 300 ml     - Continue to monitor UOP and Serum creatinine closely.   - Avoid nephrotoxic agents, NSAIDs and IV contrast   - Strict I/O.   - Renally dose all medications by the most recent CrCl from Cockcroft-Gault formula.    2. Immunosuppression   - continue current immunosuppression   - Monitor tacrolimus trough level   FK level = 12.6 (unknown if timing accurate); continue tacrolimus 1 at 0630 and 0.5 mg at 1830 (please re-time for 0630/1830)  MMF 1000 mg BID  Prednisone 15 mg daily (last decreased 6/25)    3. Anemia and thrombocytopenia : underlying ITP    Lab Results   Component Value Date    WBC 6.0 07/01/2025    HGB 9.8 (L) 07/01/2025    HCT 28.8 (L) 07/01/2025    MCV 98 07/01/2025    PLT 33 (LL) 07/01/2025     -Continue to monitor Hgb and platelet  -No indications for transfusion  - Resume eltrombopag. Noted this is her home med prior to transplant and hematology recommended to restart it when they were consulted during hospital index stay.    4. Electrolyte / Hypo K-MG-Ca    Lab Results   Component Value Date    GLUCOSE 140 (H) 07/01/2025    CALCIUM 8.5 (L) 07/01/2025     07/01/2025    K 3.2 (L) 07/01/2025    CO2 31 07/01/2025     07/01/2025    BUN 25 (H) 07/01/2025    CREATININE 1.51 (H) 07/01/2025     - Reviewed renal profile.   - Replace K, Mg and  Ca    6. Hypertension   Blood Pressures         7/1/2025  0423 7/1/2025  0500 7/1/2025  0600 7/1/2025  0817 7/1/2025  1230    BP: 167/79 167/79 125/69 160/76 148/71          - BP had been around   -Goal BP < 140/90 mmHg   -continue current management     7. Decreased PO intake  - Hydrate  - ambulate  - PT/OT    8.  GI prophylaxis   - On PPI     9. DVT Prophylaxis  -Defer to primary team    * Case was discussed with primary team.  For questions, please contact transplant nephrology page x [phone].    Ellyn Lowery MD    Transplant Nephrologist        [1]   Past Medical History:  Diagnosis Date    CKD (chronic kidney disease)     Diabetes mellitus (Multi)     HLD (hyperlipidemia)     Hypertension     PONV (postoperative nausea and vomiting)    [2]   Past Surgical History:  Procedure Laterality Date    AV FISTULA PLACEMENT Left     approx 2019    MR HEAD ANGIO WO IV CONTRAST  08/07/2023    MR HEAD ANGIO WO IV CONTRAST 8/7/2023 [facility] [id number] MRI   [3] No family history on file.  [4]   Allergies  Allergen Reactions    Amlodipine Other     LEG SWELLING    Oxycodone Other     Dizziness, weakness

## 2025-07-01 NOTE — ED PROVIDER NOTES
Emergency Department Provider Note        History of Present Illness     History provided by: Patient  Limitations to History: None  External Records Reviewed with Brief Summary: hypokalemia and thrombocytopenia per outpatient lab; renal team report to admit    HPI:  Luis Panda is a 60 y.o. 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 Patient is on water pills for LE edema per family. She reports soreness to shoulders. She reports pain to her incision site but denies any sources of bleeding currently. Per family, she was on meds for thrombocytopenia in past but has been off of them since surgery for 3 weeks. Patient denies fever, productive cough, shortness of breath,  chest pain, abdominal pain.    Physical Exam   Triage vitals:  T 36.9 °C (98.4 °F)  HR 62  /75  RR 16  O2 98 % None (Room air)    General: Awake, alert, in no acute distress  Eyes: Gaze conjugate.  No scleral icterus or injection  HENT: Normo-cephalic, atraumatic. No stridor  CV: Regular rate, regular rhythm. Radial pulses 2+ bilaterally  Resp: Breathing non-labored, speaking in full sentences.  Clear to auscultation bilaterally  GI: Soft, distended, tender along incision site; incision clean dry intact staples in place. No rebound or guarding. No CVA tenderness  MSK/Extremities: No gross bony deformities. Moving all extremities, no midline back tenderness, LE edema present  Skin: Warm. Appropriate color  Neuro: Alert. Oriented. Face symmetric. Speech is fluent.  Gross strength and sensation intact in b/l UE and LEs  Psych: Appropriate mood and affect    Medical Decision Making & ED Course   Medical Decision Makin y.o. female here for hypokalemia and thrombocytopenia. Patient afebrile and hemodynamically stable. Labs ordered. Per transplant team outpatient, recommending admission for further workup. Patient and family aware and  amenable of admission. Potassium, calcium and magnesium repletion ordered. Discussed Nephrology tranplant team and patient admitted.    ----  Differential diagnoses considered include but are not limited to: transplant rejection, electrolyte derangements, thrombocytopenia     Social Determinants of Health which Significantly Impact Care: None identified     EKG Independent Interpretation: EKG not obtained    Independent Result Review and Interpretation: Relevant laboratory and radiographic results were reviewed and independently interpreted by myself.  As necessary, they are commented on in the ED Course.    Chronic conditions affecting the patient's care: As documented above in [facility]    The patient was discussed with the following consultants/services: nephro transplant    Care Considerations: As documented above in [facility]    ED Course:  ED Course as of 07/01/25 0226 Tue Jul 01, 2025 0222 Attending note    60 y.o. 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 [FN]      ED Course User Index  [FN] Cy Shah MD         Diagnoses as of 07/01/25 0226   Thrombocytopenia   Hypokalemia     Disposition   As a result of their workup, the patient will require admission to the hospital.  The patient was informed of her diagnosis.  The patient was given the opportunity to ask questions and I answered them. The patient agreed to be admitted to the hospital.    Procedures   Procedures    Patient seen and discussed with ED attending physician.    Doreen Ramos MD  Emergency Medicine            Doreen Ramos MD  Resident  07/01/25 0234

## 2025-07-02 ENCOUNTER — PHARMACY VISIT (OUTPATIENT)
Dept: PHARMACY | Facility: CLINIC | Age: 60
End: 2025-07-02
Payer: MEDICAID

## 2025-07-02 ENCOUNTER — APPOINTMENT (OUTPATIENT)
Facility: HOSPITAL | Age: 60
End: 2025-07-02
Payer: MEDICAID

## 2025-07-02 VITALS
WEIGHT: 149 LBS | SYSTOLIC BLOOD PRESSURE: 116 MMHG | HEIGHT: 65 IN | DIASTOLIC BLOOD PRESSURE: 68 MMHG | HEART RATE: 73 BPM | TEMPERATURE: 98.2 F | RESPIRATION RATE: 14 BRPM | OXYGEN SATURATION: 96 % | BODY MASS INDEX: 24.83 KG/M2

## 2025-07-02 LAB
ALBUMIN SERPL BCP-MCNC: 3.1 G/DL (ref 3.4–5)
ANION GAP SERPL CALC-SCNC: 13 MMOL/L (ref 10–20)
BASOPHILS # BLD AUTO: 0.01 X10*3/UL (ref 0–0.1)
BASOPHILS NFR BLD AUTO: 0.1 %
BUN SERPL-MCNC: 22 MG/DL (ref 6–23)
CALCIUM SERPL-MCNC: 8.3 MG/DL (ref 8.6–10.6)
CHLORIDE SERPL-SCNC: 103 MMOL/L (ref 98–107)
CO2 SERPL-SCNC: 29 MMOL/L (ref 21–32)
CREAT SERPL-MCNC: 1.35 MG/DL (ref 0.5–1.05)
EGFRCR SERPLBLD CKD-EPI 2021: 45 ML/MIN/1.73M*2
EOSINOPHIL # BLD AUTO: 0.07 X10*3/UL (ref 0–0.7)
EOSINOPHIL NFR BLD AUTO: 1 %
ERYTHROCYTE [DISTWIDTH] IN BLOOD BY AUTOMATED COUNT: 17.4 % (ref 11.5–14.5)
GLUCOSE BLD MANUAL STRIP-MCNC: 111 MG/DL (ref 74–99)
GLUCOSE BLD MANUAL STRIP-MCNC: 182 MG/DL (ref 74–99)
GLUCOSE BLD MANUAL STRIP-MCNC: 193 MG/DL (ref 74–99)
GLUCOSE SERPL-MCNC: 178 MG/DL (ref 74–99)
HCT VFR BLD AUTO: 27.3 % (ref 36–46)
HGB BLD-MCNC: 9.8 G/DL (ref 12–16)
IMM GRANULOCYTES # BLD AUTO: 0.03 X10*3/UL (ref 0–0.7)
IMM GRANULOCYTES NFR BLD AUTO: 0.4 % (ref 0–0.9)
LYMPHOCYTES # BLD AUTO: 0.88 X10*3/UL (ref 1.2–4.8)
LYMPHOCYTES NFR BLD AUTO: 12.5 %
MAGNESIUM SERPL-MCNC: 1.66 MG/DL (ref 1.6–2.4)
MCH RBC QN AUTO: 34.6 PG (ref 26–34)
MCHC RBC AUTO-ENTMCNC: 35.9 G/DL (ref 32–36)
MCV RBC AUTO: 97 FL (ref 80–100)
MONOCYTES # BLD AUTO: 0.63 X10*3/UL (ref 0.1–1)
MONOCYTES NFR BLD AUTO: 8.9 %
NEUTROPHILS # BLD AUTO: 5.43 X10*3/UL (ref 1.2–7.7)
NEUTROPHILS NFR BLD AUTO: 77.1 %
NRBC BLD-RTO: 0 /100 WBCS (ref 0–0)
PHOSPHATE SERPL-MCNC: 3.1 MG/DL (ref 2.5–4.9)
PLATELET # BLD AUTO: 43 X10*3/UL (ref 150–450)
POTASSIUM SERPL-SCNC: 3.8 MMOL/L (ref 3.5–5.3)
RBC # BLD AUTO: 2.83 X10*6/UL (ref 4–5.2)
SODIUM SERPL-SCNC: 141 MMOL/L (ref 136–145)
WBC # BLD AUTO: 7.1 X10*3/UL (ref 4.4–11.3)

## 2025-07-02 PROCEDURE — 2500000001 HC RX 250 WO HCPCS SELF ADMINISTERED DRUGS (ALT 637 FOR MEDICARE OP): Mod: SE

## 2025-07-02 PROCEDURE — 36415 COLL VENOUS BLD VENIPUNCTURE: CPT | Performed by: PHYSICIAN ASSISTANT

## 2025-07-02 PROCEDURE — 2500000002 HC RX 250 W HCPCS SELF ADMINISTERED DRUGS (ALT 637 FOR MEDICARE OP, ALT 636 FOR OP/ED): Mod: SE

## 2025-07-02 PROCEDURE — 85025 COMPLETE CBC W/AUTO DIFF WBC: CPT | Performed by: PHYSICIAN ASSISTANT

## 2025-07-02 PROCEDURE — 2500000004 HC RX 250 GENERAL PHARMACY W/ HCPCS (ALT 636 FOR OP/ED): Mod: SE

## 2025-07-02 PROCEDURE — 99238 HOSP IP/OBS DSCHRG MGMT 30/<: CPT | Performed by: TRANSPLANT SURGERY

## 2025-07-02 PROCEDURE — RXMED WILLOW AMBULATORY MEDICATION CHARGE

## 2025-07-02 PROCEDURE — 82947 ASSAY GLUCOSE BLOOD QUANT: CPT

## 2025-07-02 PROCEDURE — 83735 ASSAY OF MAGNESIUM: CPT | Performed by: PHYSICIAN ASSISTANT

## 2025-07-02 PROCEDURE — 80069 RENAL FUNCTION PANEL: CPT | Performed by: PHYSICIAN ASSISTANT

## 2025-07-02 RX ORDER — LANOLIN ALCOHOL/MO/W.PET/CERES
800 CREAM (GRAM) TOPICAL 2 TIMES DAILY
Qty: 120 TABLET | Refills: 0 | Status: SHIPPED | OUTPATIENT
Start: 2025-07-02 | End: 2025-08-01

## 2025-07-02 RX ORDER — POTASSIUM CHLORIDE 20 MEQ/1
40 TABLET, EXTENDED RELEASE ORAL 2 TIMES DAILY
Qty: 120 TABLET | Refills: 0 | Status: SHIPPED | OUTPATIENT
Start: 2025-07-02 | End: 2025-08-01

## 2025-07-02 RX ORDER — MAGNESIUM SULFATE HEPTAHYDRATE 40 MG/ML
2 INJECTION, SOLUTION INTRAVENOUS ONCE
Status: COMPLETED | OUTPATIENT
Start: 2025-07-02 | End: 2025-07-02

## 2025-07-02 RX ADMIN — CARVEDILOL 25 MG: 12.5 TABLET, FILM COATED ORAL at 09:44

## 2025-07-02 RX ADMIN — HYDRALAZINE HYDROCHLORIDE 25 MG: 25 TABLET ORAL at 10:22

## 2025-07-02 RX ADMIN — PANTOPRAZOLE SODIUM 40 MG: 40 TABLET, DELAYED RELEASE ORAL at 09:43

## 2025-07-02 RX ADMIN — TACROLIMUS 1 MG: 1 CAPSULE ORAL at 10:22

## 2025-07-02 RX ADMIN — DIBASIC SODIUM PHOSPHATE, MONOBASIC POTASSIUM PHOSPHATE AND MONOBASIC SODIUM PHOSPHATE 500 MG: 852; 155; 130 TABLET ORAL at 09:43

## 2025-07-02 RX ADMIN — CLOTRIMAZOLE 10 MG: 10 LOZENGE ORAL at 11:57

## 2025-07-02 RX ADMIN — HYDRALAZINE HYDROCHLORIDE 25 MG: 25 TABLET ORAL at 15:05

## 2025-07-02 RX ADMIN — MYCOPHENOLATE MOFETIL 1000 MG: 250 CAPSULE ORAL at 11:57

## 2025-07-02 RX ADMIN — PREDNISONE 15 MG: 10 TABLET ORAL at 09:42

## 2025-07-02 RX ADMIN — LETERMOVIR 480 MG: 480 TABLET, FILM COATED ORAL at 09:40

## 2025-07-02 RX ADMIN — POLYETHYLENE GLYCOL 3350 17 G: 17 POWDER, FOR SOLUTION ORAL at 09:40

## 2025-07-02 RX ADMIN — ATOVAQUONE 1500 MG: 750 SUSPENSION ORAL at 09:43

## 2025-07-02 RX ADMIN — ACYCLOVIR 400 MG: 400 TABLET ORAL at 09:43

## 2025-07-02 RX ADMIN — FUROSEMIDE 40 MG: 40 TABLET ORAL at 10:22

## 2025-07-02 RX ADMIN — MAGNESIUM OXIDE TAB 400 MG (241.3 MG ELEMENTAL MG) 1 TABLET: 400 (241.3 MG) TAB at 10:22

## 2025-07-02 RX ADMIN — POTASSIUM CHLORIDE 40 MEQ: 1500 TABLET, EXTENDED RELEASE ORAL at 09:41

## 2025-07-02 RX ADMIN — ELTROMBOPAG OLAMINE 12.5 MG: 25 TABLET, FILM COATED ORAL at 09:43

## 2025-07-02 RX ADMIN — INSULIN LISPRO 1 UNITS: 100 INJECTION, SOLUTION INTRAVENOUS; SUBCUTANEOUS at 11:57

## 2025-07-02 RX ADMIN — MAGNESIUM SULFATE HEPTAHYDRATE 2 G: 40 INJECTION, SOLUTION INTRAVENOUS at 12:30

## 2025-07-02 RX ADMIN — CLOTRIMAZOLE 10 MG: 10 LOZENGE ORAL at 09:41

## 2025-07-02 SDOH — ECONOMIC STABILITY: HOUSING INSECURITY: IN THE LAST 12 MONTHS, WAS THERE A TIME WHEN YOU WERE NOT ABLE TO PAY THE MORTGAGE OR RENT ON TIME?: NO

## 2025-07-02 SDOH — SOCIAL STABILITY: SOCIAL INSECURITY: DOES ANYONE TRY TO KEEP YOU FROM HAVING/CONTACTING OTHER FRIENDS OR DOING THINGS OUTSIDE YOUR HOME?: NO

## 2025-07-02 SDOH — SOCIAL STABILITY: SOCIAL INSECURITY: DO YOU FEEL ANYONE HAS EXPLOITED OR TAKEN ADVANTAGE OF YOU FINANCIALLY OR OF YOUR PERSONAL PROPERTY?: NO

## 2025-07-02 SDOH — SOCIAL STABILITY: SOCIAL INSECURITY: ARE THERE ANY APPARENT SIGNS OF INJURIES/BEHAVIORS THAT COULD BE RELATED TO ABUSE/NEGLECT?: NO

## 2025-07-02 SDOH — SOCIAL STABILITY: SOCIAL INSECURITY: ARE YOU OR HAVE YOU BEEN THREATENED OR ABUSED PHYSICALLY, EMOTIONALLY, OR SEXUALLY BY ANYONE?: NO

## 2025-07-02 SDOH — ECONOMIC STABILITY: FOOD INSECURITY: HOW HARD IS IT FOR YOU TO PAY FOR THE VERY BASICS LIKE FOOD, HOUSING, MEDICAL CARE, AND HEATING?: NOT HARD AT ALL

## 2025-07-02 SDOH — ECONOMIC STABILITY: HOUSING INSECURITY: AT ANY TIME IN THE PAST 12 MONTHS, WERE YOU HOMELESS OR LIVING IN A SHELTER (INCLUDING NOW)?: NO

## 2025-07-02 SDOH — ECONOMIC STABILITY: FOOD INSECURITY: WITHIN THE PAST 12 MONTHS, YOU WORRIED THAT YOUR FOOD WOULD RUN OUT BEFORE YOU GOT THE MONEY TO BUY MORE.: NEVER TRUE

## 2025-07-02 SDOH — ECONOMIC STABILITY: TRANSPORTATION INSECURITY: IN THE PAST 12 MONTHS, HAS LACK OF TRANSPORTATION KEPT YOU FROM MEDICAL APPOINTMENTS OR FROM GETTING MEDICATIONS?: NO

## 2025-07-02 SDOH — SOCIAL STABILITY: SOCIAL INSECURITY: ABUSE: ADULT

## 2025-07-02 SDOH — SOCIAL STABILITY: SOCIAL INSECURITY
ASK PARENT OR GUARDIAN: ARE THERE TIMES WHEN YOU, YOUR CHILD(REN), OR ANY MEMBER OF YOUR HOUSEHOLD FEEL UNSAFE, HARMED, OR THREATENED AROUND PERSONS WITH WHOM YOU KNOW OR LIVE?: NO

## 2025-07-02 SDOH — ECONOMIC STABILITY: FOOD INSECURITY: WITHIN THE PAST 12 MONTHS, THE FOOD YOU BOUGHT JUST DIDN'T LAST AND YOU DIDN'T HAVE MONEY TO GET MORE.: NEVER TRUE

## 2025-07-02 SDOH — ECONOMIC STABILITY: HOUSING INSECURITY: DO YOU FEEL UNSAFE GOING BACK TO THE PLACE WHERE YOU LIVE?: NO

## 2025-07-02 SDOH — SOCIAL STABILITY: SOCIAL INSECURITY: DO YOU FEEL UNSAFE GOING BACK TO THE PLACE WHERE YOU ARE LIVING?: NO

## 2025-07-02 SDOH — ECONOMIC STABILITY: INCOME INSECURITY: IN THE PAST 12 MONTHS HAS THE ELECTRIC, GAS, OIL, OR WATER COMPANY THREATENED TO SHUT OFF SERVICES IN YOUR HOME?: NO

## 2025-07-02 SDOH — SOCIAL STABILITY: SOCIAL INSECURITY: WITHIN THE LAST YEAR, HAVE YOU BEEN HUMILIATED OR EMOTIONALLY ABUSED IN OTHER WAYS BY YOUR PARTNER OR EX-PARTNER?: NO

## 2025-07-02 SDOH — ECONOMIC STABILITY: HOUSING INSECURITY: IN THE PAST 12 MONTHS, HOW MANY TIMES HAVE YOU MOVED WHERE YOU WERE LIVING?: 0

## 2025-07-02 SDOH — SOCIAL STABILITY: SOCIAL INSECURITY: HAVE YOU HAD ANY THOUGHTS OF HARMING ANYONE ELSE?: NO

## 2025-07-02 SDOH — SOCIAL STABILITY: SOCIAL INSECURITY: WITHIN THE LAST YEAR, HAVE YOU BEEN AFRAID OF YOUR PARTNER OR EX-PARTNER?: NO

## 2025-07-02 SDOH — SOCIAL STABILITY: SOCIAL INSECURITY: HAS ANYONE EVER THREATENED TO HURT YOUR FAMILY OR YOUR PETS?: NO

## 2025-07-02 SDOH — SOCIAL STABILITY: SOCIAL INSECURITY: WERE YOU ABLE TO COMPLETE ALL THE BEHAVIORAL HEALTH SCREENINGS?: YES

## 2025-07-02 SDOH — SOCIAL STABILITY: SOCIAL INSECURITY: HAVE YOU HAD THOUGHTS OF HARMING ANYONE ELSE?: NO

## 2025-07-02 ASSESSMENT — LIFESTYLE VARIABLES
AUDIT-C TOTAL SCORE: 0
HOW OFTEN DO YOU HAVE 6 OR MORE DRINKS ON ONE OCCASION: NEVER
AUDIT-C TOTAL SCORE: 0
SUBSTANCE_ABUSE_PAST_12_MONTHS: NO
HOW OFTEN DO YOU HAVE 6 OR MORE DRINKS ON ONE OCCASION: NEVER
SUBSTANCE_ABUSE_PAST_12_MONTHS: NO
HOW MANY STANDARD DRINKS CONTAINING ALCOHOL DO YOU HAVE ON A TYPICAL DAY: PATIENT DOES NOT DRINK
PRESCIPTION_ABUSE_PAST_12_MONTHS: NO
SKIP TO QUESTIONS 9-10: 1
HOW OFTEN DO YOU HAVE A DRINK CONTAINING ALCOHOL: NEVER
SKIP TO QUESTIONS 9-10: 1
AUDIT-C TOTAL SCORE: 0
AUDIT-C TOTAL SCORE: 0
HOW OFTEN DO YOU HAVE A DRINK CONTAINING ALCOHOL: NEVER
HOW MANY STANDARD DRINKS CONTAINING ALCOHOL DO YOU HAVE ON A TYPICAL DAY: PATIENT DOES NOT DRINK

## 2025-07-02 ASSESSMENT — PATIENT HEALTH QUESTIONNAIRE - PHQ9
2. FEELING DOWN, DEPRESSED OR HOPELESS: NOT AT ALL
SUM OF ALL RESPONSES TO PHQ9 QUESTIONS 1 & 2: 0
SUM OF ALL RESPONSES TO PHQ9 QUESTIONS 1 & 2: 0
2. FEELING DOWN, DEPRESSED OR HOPELESS: NOT AT ALL
1. LITTLE INTEREST OR PLEASURE IN DOING THINGS: NOT AT ALL
1. LITTLE INTEREST OR PLEASURE IN DOING THINGS: NOT AT ALL

## 2025-07-02 ASSESSMENT — ACTIVITIES OF DAILY LIVING (ADL)
GROOMING: INDEPENDENT
JUDGMENT_ADEQUATE_SAFELY_COMPLETE_DAILY_ACTIVITIES: YES
WALKS IN HOME: INDEPENDENT
FEEDING YOURSELF: INDEPENDENT
LACK_OF_TRANSPORTATION: NO
HEARING - LEFT EAR: FUNCTIONAL
BATHING: INDEPENDENT
HEARING - RIGHT EAR: FUNCTIONAL
LACK_OF_TRANSPORTATION: NO
DRESSING YOURSELF: INDEPENDENT
PATIENT'S MEMORY ADEQUATE TO SAFELY COMPLETE DAILY ACTIVITIES?: YES
TOILETING: INDEPENDENT
ADEQUATE_TO_COMPLETE_ADL: YES

## 2025-07-02 ASSESSMENT — COGNITIVE AND FUNCTIONAL STATUS - GENERAL
PATIENT BASELINE BEDBOUND: NO
DAILY ACTIVITIY SCORE: 24
MOBILITY SCORE: 24

## 2025-07-02 ASSESSMENT — PAIN SCALES - GENERAL: PAINLEVEL_OUTOF10: 3

## 2025-07-02 NOTE — NURSING NOTE
Patient sent to ED and subsequently admitted for electrolyte abnormalities and thrombocytopenia. Patient repleted with IV Mag, K, and Ca gluconate. Promacta was restarted as well.     Outpatient Plan:  Restart ASA when plts 50 or higher per heme  Discharged on PO Mag and K  Labs Mon/Thurs  TI appt Wed 7/9

## 2025-07-02 NOTE — DISCHARGE INSTR - OTHER ORDERS
Please follow your new medication chart. Some medications or doses changed.     Please restart your Promacta (eltrombopag) to help keep your platelet level up.     Go to the lab again tomorrow and Monday    Go to the urology visit on Monday July 7th at 2:00pm to have your stent removed    We'll see you in the transplant clinic on Wed July 9th at 9:30am    Call the coordinators if you have any questions or concerns at [phone] or if the office is closed, call the hospital  at [phone] and ask them to page the on-call kidney transplant coordinator.

## 2025-07-02 NOTE — CARE PLAN
The patient's goals for the shift include      The clinical goals for the shift include patient  will rmain HDS this admission    Over the shift, the patient did  Problem: Pain - Adult  Goal: Verbalizes/displays adequate comfort level or baseline comfort level  Outcome: Met     Problem: Safety - Adult  Goal: Free from fall injury  Outcome: Met     Problem: Discharge Planning  Goal: Discharge to home or other facility with appropriate resources  Outcome: Met     Problem: Chronic Conditions and Co-morbidities  Goal: Patient's chronic conditions and co-morbidity symptoms are monitored and maintained or improved  Outcome: Met     Problem: Nutrition  Goal: Nutrient intake appropriate for maintaining nutritional needs  Outcome: Met     Problem: Skin  Goal: Decreased wound size/increased tissue granulation at next dressing change  Outcome: Met  Goal: Participates in plan/prevention/treatment measures  Outcome: Met  Goal: Prevent/manage excess moisture  Outcome: Met  Goal: Prevent/minimize sheer/friction injuries  Outcome: Met  Goal: Promote/optimize nutrition  Outcome: Met  Goal: Promote skin healing  Outcome: Met    make progress toward the following goals.      Surgeon Performing Repair (Optional): Tegan

## 2025-07-02 NOTE — DISCHARGE SUMMARY
Discharge Diagnosis  Thrombocytopenia    Issues Requiring Follow-Up  Follow up with outpatient labs, can resume ASA when platelets are 50.    Test Results Pending At Discharge  Pending Labs       No current pending labs.            Hospital Course    Patient is a 60F with h/o ITP, DM, ESRD 2/2 ADPKD s/p DDKT (6/10/25) presented to [facility] ED for outpatient labs demonstrating thrombocytopenia (Plt 37) , hypomagnesemia (Mg 1.17) and hypokalemia (K 2.8). She denied N/V, diarrhea. Patient required multiple repletions and IVFs to correct electrolyte derangements over the course of two days. Given her thrombocytopenia, patient was restarted on Promacta.     All electrolyte abnormalities are now corrected and patient is doing well on exam. Most recent labs show platelets at 43, per hematology, patient can resume ASA for anticoagulation once platelets at 50.    Visit Vitals  /68 (BP Location: Right arm, Patient Position: Lying)   Pulse 73   Temp 36.8 °C (98.2 °F) (Temporal)   Resp 14     Vitals:    07/01/25 0138   Weight: 67.6 kg (149 lb)       Immunization History   Administered Date(s) Administered    Moderna COVID-19 vaccine, bivalent, blue cap/gray label *Check age/dose* 12/20/2022    Moderna SARS-CoV-2 Vaccination 07/13/2021, 08/10/2021       Results  Results for orders placed or performed during the hospital encounter of 07/01/25 (from the past 24 hours)   Renal function panel   Result Value Ref Range    Glucose 209 (H) 74 - 99 mg/dL    Sodium 140 136 - 145 mmol/L    Potassium 4.4 3.5 - 5.3 mmol/L    Chloride 102 98 - 107 mmol/L    Bicarbonate 30 21 - 32 mmol/L    Anion Gap 12 10 - 20 mmol/L    Urea Nitrogen 23 6 - 23 mg/dL    Creatinine 1.32 (H) 0.50 - 1.05 mg/dL    eGFR 46 (L) >60 mL/min/1.73m*2    Calcium 8.5 (L) 8.6 - 10.6 mg/dL    Phosphorus 3.0 2.5 - 4.9 mg/dL    Albumin 3.4 3.4 - 5.0 g/dL   Calcium, ionized   Result Value Ref Range    POCT Calcium, Ionized 1.07 (L) 1.1 - 1.33 mmol/L   CBC   Result Value  Ref Range    WBC 6.0 4.4 - 11.3 x10*3/uL    nRBC 0.0 0.0 - 0.0 /100 WBCs    RBC 2.93 (L) 4.00 - 5.20 x10*6/uL    Hemoglobin 9.8 (L) 12.0 - 16.0 g/dL    Hematocrit 28.8 (L) 36.0 - 46.0 %    MCV 98 80 - 100 fL    MCH 33.4 26.0 - 34.0 pg    MCHC 34.0 32.0 - 36.0 g/dL    RDW 17.6 (H) 11.5 - 14.5 %    Platelets 33 (LL) 150 - 450 x10*3/uL   POCT GLUCOSE   Result Value Ref Range    POCT Glucose 186 (H) 74 - 99 mg/dL   POCT GLUCOSE   Result Value Ref Range    POCT Glucose 193 (H) 74 - 99 mg/dL   POCT GLUCOSE   Result Value Ref Range    POCT Glucose 111 (H) 74 - 99 mg/dL   Renal Function Panel   Result Value Ref Range    Glucose 178 (H) 74 - 99 mg/dL    Sodium 141 136 - 145 mmol/L    Potassium 3.8 3.5 - 5.3 mmol/L    Chloride 103 98 - 107 mmol/L    Bicarbonate 29 21 - 32 mmol/L    Anion Gap 13 10 - 20 mmol/L    Urea Nitrogen 22 6 - 23 mg/dL    Creatinine 1.35 (H) 0.50 - 1.05 mg/dL    eGFR 45 (L) >60 mL/min/1.73m*2    Calcium 8.3 (L) 8.6 - 10.6 mg/dL    Phosphorus 3.1 2.5 - 4.9 mg/dL    Albumin 3.1 (L) 3.4 - 5.0 g/dL   Magnesium   Result Value Ref Range    Magnesium 1.66 1.60 - 2.40 mg/dL   CBC and Auto Differential   Result Value Ref Range    WBC 7.1 4.4 - 11.3 x10*3/uL    nRBC 0.0 0.0 - 0.0 /100 WBCs    RBC 2.83 (L) 4.00 - 5.20 x10*6/uL    Hemoglobin 9.8 (L) 12.0 - 16.0 g/dL    Hematocrit 27.3 (L) 36.0 - 46.0 %    MCV 97 80 - 100 fL    MCH 34.6 (H) 26.0 - 34.0 pg    MCHC 35.9 32.0 - 36.0 g/dL    RDW 17.4 (H) 11.5 - 14.5 %    Platelets 43 (L) 150 - 450 x10*3/uL    Neutrophils % 77.1 40.0 - 80.0 %    Immature Granulocytes %, Automated 0.4 0.0 - 0.9 %    Lymphocytes % 12.5 13.0 - 44.0 %    Monocytes % 8.9 2.0 - 10.0 %    Eosinophils % 1.0 0.0 - 6.0 %    Basophils % 0.1 0.0 - 2.0 %    Neutrophils Absolute 5.43 1.20 - 7.70 x10*3/uL    Immature Granulocytes Absolute, Automated 0.03 0.00 - 0.70 x10*3/uL    Lymphocytes Absolute 0.88 (L) 1.20 - 4.80 x10*3/uL    Monocytes Absolute 0.63 0.10 - 1.00 x10*3/uL    Eosinophils Absolute  "0.07 0.00 - 0.70 x10*3/uL    Basophils Absolute 0.01 0.00 - 0.10 x10*3/uL   POCT GLUCOSE   Result Value Ref Range    POCT Glucose 182 (H) 74 - 99 mg/dL         Pertinent Physical Exam At Time of Discharge  Physical Exam  Constitutional:       General: She is not in acute distress.     Appearance: Normal appearance.   HENT:      Head: Normocephalic and atraumatic.   Cardiovascular:      Rate and Rhythm: Normal rate and regular rhythm.      Pulses: Normal pulses.   Pulmonary:      Effort: Pulmonary effort is normal.   Abdominal:      General: Abdomen is flat.      Palpations: Abdomen is soft.   Musculoskeletal:         General: Normal range of motion.   Skin:     General: Skin is warm and dry.   Neurological:      General: No focal deficit present.   Psychiatric:         Mood and Affect: Mood normal.         Behavior: Behavior normal.         Home Medications     Medication List      PAUSE taking these medications     aspirin 81 mg EC tablet; Wait to take this until your doctor or other   care provider tells you to start again.     START taking these medications     magnesium oxide 400 mg (241.3 mg elemental) tablet; Commonly known as:   Mag-Ox; Take 2 tablets by mouth 2 times a day.; Notes to patient: Separate   from mycophenolate by at least 2 hours   potassium chloride CR 20 mEq ER tablet; Commonly known as: Klor-Con M20;   Take 2 tablets (40 mEq) by mouth 2 times a day. Do not crush or chew.   Promacta 12.5 mg tablet; Generic drug: eltrombopag olamine; Take 1   tablet (12.5 mg) by mouth once daily in the morning. Take before meals.   Take on an empty stomach, 1 hour before or 2 hours afer a meal.     CONTINUE taking these medications     acyclovir 400 mg tablet; Commonly known as: Zovirax; Take 1 tablet (400   mg) by mouth 2 times a day.   atovaquone 750 mg/5 mL suspension; Commonly known as: Mepron; Take 10 mL   (1,500 mg) by mouth once daily.   BD Ultra-Fine Linda Pen Needle 32 gauge x 5/32\" needle; Generic drug: " pen   needle, diabetic; Use 1 each 5 times a day.   carvedilol 25 mg tablet; Commonly known as: Coreg   clotrimazole 10 mg elissa; Commonly known as: Mycelex; Use 1 tablet (10   mg) in the mouth or throat 3 times daily (morning, midday, late   afternoon).; Notes to patient: Do not chew, let dissolve completely. Do   not eat or drink anything for 15 minutes after taking.   furosemide 40 mg tablet; Commonly known as: Lasix; Take 1 tablet (40 mg)   by mouth once daily.   gabapentin 100 mg capsule; Commonly known as: Neurontin; Take 1 capsule   (100 mg) by mouth once daily.   HumuLIN N NPH Insulin KwikPen 100 unit/mL (3 mL) pen; Generic drug:   insulin NPH (Isophane); Inject 12 Units under the skin once every 24   hours. Take as directed per insulin instructions.   hydrALAZINE 25 mg tablet; Commonly known as: Apresoline; Take 1 tablet   (25 mg) by mouth 3 times a day.   insulin lispro 100 unit/mL pen; Commonly known as: HumaLOG; Inject 0-10   Units under the skin 3 times a day before meals. Take as directed per   insulin instructions. Hypoglycemia protocol Call LIP unit(s) if Blood   Glucose is between 0 - 70 mg/d  0 unit(s) if Blood glucose is between     1 unit(s) if Blood glucose is between 201-250  2 unit(s) if Blood   glucose is between 251-300  3 unit(s) if Bloodglucose is between 301-350    4 unit(s) if Blood glucose is between 351-400  If blood glucose is greater   than 400 mg/dL, give max insulin per sliding scale AND then contact   provider.   letermovir 480 mg tablet; Commonly known as: Prevymis; Take 1 tablet   (480 mg) by mouth once daily.   mycophenolate 250 mg capsule; Commonly known as: Cellcept; Take 4   capsules (1,000 mg) by mouth 2 times a day.   pantoprazole 40 mg EC tablet; Commonly known as: ProtoNix; Take 1 tablet   (40 mg) by mouth once daily in the morning. Take before meals. Do not   crush, chew, or split.   Phospha 250 Neutral tablet; Generic drug: sod phos di, mono-K phos mono;   Take  2 tablets (500 mg) by mouth 2 times a day.   polyethylene glycol 17 gram/dose powder; Commonly known as: Glycolax,   Miralax; Mix 17 g of powder and drink once daily.   predniSONE 5 mg tablet; Commonly known as: Deltasone; Take 3 tablets (15   mg) by mouth once daily.   rosuvastatin 10 mg tablet; Commonly known as: Crestor; Take 1 tablet (10   mg) by mouth once daily at bedtime.   tacrolimus 0.5 mg capsule; Commonly known as: Prograf; Take 2 capsules   (1 mg) by mouth once daily in the morning AND 1 capsule (0.5 mg) once   daily at bedtime.; Notes to patient: Do not take on the morning of your   lab tests until after your blood is drawn   traZODone 50 mg tablet; Commonly known as: Desyrel   TRUEplus Lancets 33 gauge misc; Generic drug: lancets     STOP taking these medications     methocarbamol 500 mg tablet; Commonly known as: Robaxin       Outpatient Follow-Up  Future Appointments   Date Time Provider Department Center   7/7/2025  2:00 PM Jf Friedman MD [facility]   7/9/2025  9:30 AM TXP ABDOMINAL SURGEON [id number]   7/10/2025  8:30 AM Della Santo PA-C [id number]   7/11/2025  8:00 AM Shante Sr RDN, AMY [id number]   7/16/2025 10:00 AM TXP ABDOMINAL SURGEON [id number]   8/7/2025  8:40 AM Cayden Stone PharmD [id number]   11/10/2025 10:30 AM Karina Mendoza PA-C [id number]       Aravind Gonzalez MD

## 2025-07-03 ENCOUNTER — PATIENT OUTREACH (OUTPATIENT)
Dept: CARE COORDINATION | Age: 60
End: 2025-07-03
Payer: MEDICAID

## 2025-07-04 DIAGNOSIS — Z94.0 KIDNEY REPLACED BY TRANSPLANT (HHS-HCC): ICD-10-CM

## 2025-07-07 ENCOUNTER — PATIENT OUTREACH (OUTPATIENT)
Dept: CARE COORDINATION | Age: 60
End: 2025-07-07
Payer: MEDICAID

## 2025-07-07 ENCOUNTER — PROCEDURE VISIT (OUTPATIENT)
Dept: UROLOGY | Facility: HOSPITAL | Age: 60
End: 2025-07-07
Payer: MEDICAID

## 2025-07-07 ENCOUNTER — LAB (OUTPATIENT)
Dept: LAB | Facility: HOSPITAL | Age: 60
End: 2025-07-07
Payer: MEDICAID

## 2025-07-07 VITALS — SYSTOLIC BLOOD PRESSURE: 119 MMHG | HEART RATE: 74 BPM | DIASTOLIC BLOOD PRESSURE: 62 MMHG

## 2025-07-07 DIAGNOSIS — Z94.0 KIDNEY TRANSPLANT STATUS: Primary | ICD-10-CM

## 2025-07-07 DIAGNOSIS — Z96.0 RETAINED URETERAL STENT: Primary | ICD-10-CM

## 2025-07-07 DIAGNOSIS — R39.9 LOWER URINARY TRACT SYMPTOMS (LUTS): ICD-10-CM

## 2025-07-07 LAB
ALBUMIN SERPL BCP-MCNC: 3.3 G/DL (ref 3.4–5)
ANION GAP SERPL CALC-SCNC: 13 MMOL/L (ref 10–20)
BUN SERPL-MCNC: 23 MG/DL (ref 6–23)
CALCIUM SERPL-MCNC: 8.4 MG/DL (ref 8.6–10.3)
CHLORIDE SERPL-SCNC: 105 MMOL/L (ref 98–107)
CO2 SERPL-SCNC: 26 MMOL/L (ref 21–32)
CREAT SERPL-MCNC: 1.41 MG/DL (ref 0.5–1.05)
EGFRCR SERPLBLD CKD-EPI 2021: 42 ML/MIN/1.73M*2
ERYTHROCYTE [DISTWIDTH] IN BLOOD BY AUTOMATED COUNT: 17.2 % (ref 11.5–14.5)
GLUCOSE SERPL-MCNC: 115 MG/DL (ref 74–99)
HCT VFR BLD AUTO: 29 % (ref 36–46)
HGB BLD-MCNC: 9.3 G/DL (ref 12–16)
MAGNESIUM SERPL-MCNC: 1.44 MG/DL (ref 1.6–2.4)
MCH RBC QN AUTO: 33.6 PG (ref 26–34)
MCHC RBC AUTO-ENTMCNC: 32.1 G/DL (ref 32–36)
MCV RBC AUTO: 105 FL (ref 80–100)
NRBC BLD-RTO: 0 /100 WBCS (ref 0–0)
PHOSPHATE SERPL-MCNC: 2.9 MG/DL (ref 2.5–4.9)
PLATELET # BLD AUTO: 76 X10*3/UL (ref 150–450)
POC APPEARANCE, URINE: CLEAR
POC BILIRUBIN, URINE: NEGATIVE
POC BLOOD, URINE: ABNORMAL
POC COLOR, URINE: YELLOW
POC GLUCOSE, URINE: ABNORMAL MG/DL
POC KETONES, URINE: NEGATIVE MG/DL
POC LEUKOCYTES, URINE: NEGATIVE
POC NITRITE,URINE: NEGATIVE
POC PH, URINE: 6 PH
POC PROTEIN, URINE: NEGATIVE MG/DL
POC SPECIFIC GRAVITY, URINE: 1.01
POC UROBILINOGEN, URINE: 0.2 EU/DL
POTASSIUM SERPL-SCNC: 5 MMOL/L (ref 3.5–5.3)
RBC # BLD AUTO: 2.77 X10*6/UL (ref 4–5.2)
SODIUM SERPL-SCNC: 139 MMOL/L (ref 136–145)
TACROLIMUS BLD-MCNC: 11.4 NG/ML
WBC # BLD AUTO: 4.3 X10*3/UL (ref 4.4–11.3)

## 2025-07-07 PROCEDURE — 80069 RENAL FUNCTION PANEL: CPT

## 2025-07-07 PROCEDURE — 36415 COLL VENOUS BLD VENIPUNCTURE: CPT

## 2025-07-07 PROCEDURE — 85027 COMPLETE CBC AUTOMATED: CPT

## 2025-07-07 PROCEDURE — 52310 CYSTOSCOPY AND TREATMENT: CPT | Performed by: UROLOGY

## 2025-07-07 PROCEDURE — 99213 OFFICE O/P EST LOW 20 MIN: CPT | Mod: 25 | Performed by: UROLOGY

## 2025-07-07 PROCEDURE — 81003 URINALYSIS AUTO W/O SCOPE: CPT | Performed by: UROLOGY

## 2025-07-07 PROCEDURE — 99203 OFFICE O/P NEW LOW 30 MIN: CPT | Performed by: UROLOGY

## 2025-07-07 PROCEDURE — 80197 ASSAY OF TACROLIMUS: CPT

## 2025-07-07 PROCEDURE — 83735 ASSAY OF MAGNESIUM: CPT

## 2025-07-07 NOTE — PROGRESS NOTES
7/7 Temp: 97.9   Intake did not measure   Output did not measure  Incision appearance: steristrips out    drainage/odor: none      Stools mild constipation   Appetite good   Questions/Issues: none  Education needed: none  Next clinic visit: Wed 7/9     Any equipment issues: none  Will continue to monitor stint out wed

## 2025-07-07 NOTE — PROGRESS NOTES
"NPV - Transplant Cysto, stent removal    HISTORY OF PRESENT ILLNESS:   Luis Panda is a 60 y.o. female who is being seen today for cysto stent removal    h/o ITP, DM, ESRD 2/2 ADPKD s/p DDKT (6/10/25)     Voiding well    PAST MEDICAL HISTORY:  Medical History[1]    PAST SURGICAL HISTORY:  Surgical History[2]     ALLERGIES:   Allergies[3]     MEDICATIONS:   Current Outpatient Medications   Medication Instructions    acyclovir (ZOVIRAX) 400 mg, oral, 2 times daily    [Paused] aspirin 81 mg EC tablet Take 1 tablet (81 mg) by mouth once daily.    atovaquone (MEPRON) 1,500 mg, oral, Daily    carvedilol (COREG) 25 mg, oral, 2 times daily (morning and late afternoon)    clotrimazole (MYCELEX) 10 mg, Mouth/Throat, 3 times daily (morning, midday, late afternoon)    furosemide (LASIX) 40 mg, oral, Daily    gabapentin (NEURONTIN) 100 mg, oral, Daily    HumuLIN N NPH Insulin KwikPen 12 Units, subcutaneous, Every 24 hours scheduled, Take as directed per insulin instructions.    hydrALAZINE (APRESOLINE) 25 mg, oral, 3 times daily    insulin lispro (HUMALOG) 0-10 Units, subcutaneous, 3 times daily before meals, Take as directed per insulin instructions.  Hypoglycemia protocol Call LIP unit(s) if Blood Glucose is between 0 - 70 mg/d   0 unit(s) if Blood glucose is between    1 unit(s) if Blood glucose is between 201-250   2 unit(s) if Blood glucose is between 251-300   3 unit(s) if Bloodglucose is between 301-350   4 unit(s) if Blood glucose is between 351-400   If blood glucose is greater than 400 mg/dL, give max insulin per sliding scale AND then contact provider.    letermovir (PREVYMIS) 480 mg, oral, Daily    magnesium oxide (Mag-Ox) 400 mg (241.3 mg elemental) tablet 2 tablets, oral, 2 times daily    mycophenolate (CELLCEPT) 1,000 mg, oral, 2 times daily    pantoprazole (PROTONIX) 40 mg, oral, Daily before breakfast, Do not crush, chew, or split.    pen needle, diabetic 32 gauge x 5/32\" needle Use 1 each 5 times a " "day.    polyethylene glycol (GLYCOLAX, MIRALAX) 17 g, oral, Daily    potassium chloride CR (Klor-Con M20) 20 mEq ER tablet 40 mEq, oral, 2 times daily, Do not crush or chew.    predniSONE (DELTASONE) 15 mg, oral, Daily    Promacta 12.5 mg, oral, Daily before breakfast, Take on an empty stomach, 1 hour before or 2 hours afer a meal.    rosuvastatin (CRESTOR) 10 mg, oral, Nightly    sod phos di, mono-K phos mono (K Phos Neutral) tablet 500 mg, oral, 2 times daily    tacrolimus (Prograf) 0.5 mg capsule Take 2 capsules (1 mg) by mouth once daily in the morning AND 1 capsule (0.5 mg) once daily at bedtime.    traZODone (Desyrel) 50 mg tablet 1 tablet, oral, Nightly    TRUEplus Lancets 33 gauge misc         PHYSICAL EXAM:  There were no vitals taken for this visit.  Constitutional: Patient appears well-developed and well-nourished. No distress.    Pulmonary/Chest: Effort normal. No respiratory distress.   Musculoskeletal: Normal range of motion.    Neurological: Alert and oriented to person, place, and time.  Psychiatric: Normal mood and affect. Behavior is normal. Thought content normal.      Labs  No results found for: \"GFRMALE\"  Lab Results   Component Value Date    CREATININE 1.35 (H) 07/02/2025       Procedures  Cystoscopy   After informed consent was obtained, the patient was taken to the procedure room for cystoscopy for ureteral stent removal.    Procedure Note:   A sterile prep and drape was performed in standard fashion. Lidocaine jelly was injected into the urethra. A flexible cystoscope was inserted into the bladder without difficulty.    The ureteral stent was seen and grasped using forceps.  The stent was completely removed.      Post-Procedure:  The cystoscope was removed. The vital signs were stable. The patient tolerated the procedure well. There were no complications.     Assessment:      1. Retained ureteral stent        2. Lower urinary tract symptoms (LUTS)  POCT UA Automated manually resulted    "          Plan:   1)  FU with Transplant  2) Post cysto, stent removal instructions given, warning signs discussed          [1]   Past Medical History:  Diagnosis Date    CKD (chronic kidney disease)     Diabetes mellitus (Multi)     HLD (hyperlipidemia)     Hypertension     PONV (postoperative nausea and vomiting)    [2]   Past Surgical History:  Procedure Laterality Date    AV FISTULA PLACEMENT Left     approx 2019    MR HEAD ANGIO WO IV CONTRAST  08/07/2023    MR HEAD ANGIO WO IV CONTRAST [place] [facility] [id number] [place]   [3]   Allergies  Allergen Reactions    Amlodipine Other     LEG SWELLING    Oxycodone Other     Dizziness, weakness

## 2025-07-08 ENCOUNTER — PATIENT OUTREACH (OUTPATIENT)
Dept: CARE COORDINATION | Age: 60
End: 2025-07-08
Payer: MEDICAID

## 2025-07-08 DIAGNOSIS — Z94.0 KIDNEY REPLACED BY TRANSPLANT (HHS-HCC): ICD-10-CM

## 2025-07-08 NOTE — PATIENT INSTRUCTIONS
Stop potassium supplement  Decrease prednisone 10 mg (2 tablet) daily  Stop Lasix  Decrease tacrolimus to 0.5 mg every 12 hours  Labs every Monday/Thursday  RTC 2 weeks

## 2025-07-08 NOTE — PROGRESS NOTES
7/8  Called pt   Temp: 97.8    Intake: did not measure    Output: did not measure    Incision appearance: staples        drainage:none     odor:none      Stools: normal   Appetite: good    Questions/Issues:none  Education needed:none  Next clinic visit:7/9/25     Any equipment issues:none  Will continue to monitor

## 2025-07-09 ENCOUNTER — PHARMACY VISIT (OUTPATIENT)
Dept: PHARMACY | Facility: CLINIC | Age: 60
End: 2025-07-09
Payer: MEDICAID

## 2025-07-09 ENCOUNTER — OFFICE VISIT (OUTPATIENT)
Facility: HOSPITAL | Age: 60
End: 2025-07-09
Payer: MEDICAID

## 2025-07-09 VITALS
DIASTOLIC BLOOD PRESSURE: 77 MMHG | OXYGEN SATURATION: 98 % | HEART RATE: 69 BPM | SYSTOLIC BLOOD PRESSURE: 148 MMHG | WEIGHT: 144.6 LBS | BODY MASS INDEX: 24.06 KG/M2 | TEMPERATURE: 98.5 F

## 2025-07-09 DIAGNOSIS — Z94.0 IMMUNOSUPPRESSIVE MANAGEMENT ENCOUNTER FOLLOWING KIDNEY TRANSPLANT: Primary | ICD-10-CM

## 2025-07-09 DIAGNOSIS — Z79.899 IMMUNOSUPPRESSIVE MANAGEMENT ENCOUNTER FOLLOWING KIDNEY TRANSPLANT: Primary | ICD-10-CM

## 2025-07-09 DIAGNOSIS — Z94.0 KIDNEY REPLACED BY TRANSPLANT (HHS-HCC): ICD-10-CM

## 2025-07-09 PROCEDURE — RXMED WILLOW AMBULATORY MEDICATION CHARGE

## 2025-07-09 PROCEDURE — 99214 OFFICE O/P EST MOD 30 MIN: CPT | Mod: 24 | Performed by: STUDENT IN AN ORGANIZED HEALTH CARE EDUCATION/TRAINING PROGRAM

## 2025-07-09 RX ORDER — PREDNISONE 5 MG/1
10 TABLET ORAL DAILY
Qty: 60 TABLET | Refills: 11 | Status: SHIPPED | OUTPATIENT
Start: 2025-07-09 | End: 2026-07-09

## 2025-07-09 RX ORDER — TACROLIMUS 0.5 MG/1
0.5 CAPSULE ORAL 2 TIMES DAILY
Qty: 60 CAPSULE | Refills: 11 | Status: SHIPPED | OUTPATIENT
Start: 2025-07-09 | End: 2025-07-09 | Stop reason: SDUPTHER

## 2025-07-09 RX ORDER — TACROLIMUS 0.5 MG/1
0.5 CAPSULE ORAL 2 TIMES DAILY
Qty: 60 CAPSULE | Refills: 11 | Status: SHIPPED | OUTPATIENT
Start: 2025-07-09 | End: 2026-07-09

## 2025-07-09 NOTE — PROGRESS NOTES
Pharmacist Post-Transplant Clinic Visit    Luis Panda is a 60 y.o. female who underwent kidney transplant on 6/10/25, postoperative day 29    Luis was seen today in transplant surgery clinic by the transplant pharmacist. The patient's son was also present for this education session. The patient's son is the primary caregiver and handles the pillbox filling, so he was the one communicated with during the visit.     Current Outpatient Medications on File Prior to Visit   Medication Sig Dispense Refill    acyclovir (Zovirax) 400 mg tablet Take 1 tablet (400 mg) by mouth 2 times a day. 60 tablet 2    atovaquone (Mepron) 750 mg/5 mL suspension Take 10 mL (1,500 mg) by mouth once daily. 300 mL 5    carvedilol (Coreg) 25 mg tablet Take 1 tablet (25 mg) by mouth 2 times daily (morning and late afternoon).      clotrimazole (Mycelex) 10 mg marcial Use 1 tablet (10 mg) in the mouth or throat 3 times daily (morning, midday, late afternoon). 90 Marcial 2    eltrombopag olamine (Promacta) 12.5 mg tablet Take 1 tablet (12.5 mg) by mouth once daily in the morning. Take before meals. Take on an empty stomach, 1 hour before or 2 hours afer a meal. 30 tablet 11    gabapentin (Neurontin) 100 mg capsule Take 1 capsule (100 mg) by mouth once daily. 30 capsule 11    hydrALAZINE (Apresoline) 25 mg tablet Take 1 tablet (25 mg) by mouth 3 times a day. 90 tablet 11    insulin lispro (HumaLOG) 100 unit/mL pen Inject 0-10 Units under the skin 3 times a day before meals. Take as directed per insulin instructions.  Hypoglycemia protocol Call LIP unit(s) if Blood Glucose is between 0 - 70 mg/d   0 unit(s) if Blood glucose is between    1 unit(s) if Blood glucose is between 201-250   2 unit(s) if Blood glucose is between 251-300   3 unit(s) if Bloodglucose is between 301-350   4 unit(s) if Blood glucose is between 351-400   If blood glucose is greater than 400 mg/dL, give max insulin per sliding scale AND then contact provider.      insulin  "NPH, Isophane, (HumuLIN N,NovoLIN N) 100 unit/mL (3 mL) pen Inject 12 Units under the skin once every 24 hours. Take as directed per insulin instructions. 15 mL 11    letermovir (Prevymis) 480 mg tablet Take 1 tablet (480 mg) by mouth once daily. 30 tablet 2    magnesium oxide (Mag-Ox) 400 mg (241.3 mg elemental) tablet Take 2 tablets by mouth 2 times a day. 120 tablet 0    mycophenolate (Cellcept) 250 mg capsule Take 4 capsules (1,000 mg) by mouth 2 times a day. 240 capsule 11    pantoprazole (ProtoNix) 40 mg EC tablet Take 1 tablet (40 mg) by mouth once daily in the morning. Take before meals. Do not crush, chew, or split. 30 tablet 0    pen needle, diabetic 32 gauge x 5/32\" needle Use 1 each 5 times a day. 100 each 0    rosuvastatin (Crestor) 10 mg tablet Take 1 tablet (10 mg) by mouth once daily at bedtime. 90 tablet 3    sod phos di, mono-K phos mono (K Phos Neutral) tablet Take 2 tablets (500 mg) by mouth 2 times a day. 120 tablet 1    traZODone (Desyrel) 50 mg tablet Take 1 tablet (50 mg) by mouth once daily at bedtime.      TRUEplus Lancets 33 gauge misc       [DISCONTINUED] predniSONE (Deltasone) 5 mg tablet Take 3 tablets (15 mg) by mouth once daily. 90 tablet 11    [DISCONTINUED] tacrolimus (Prograf) 0.5 mg capsule Take 2 capsules (1 mg) by mouth once daily in the morning AND 1 capsule (0.5 mg) once daily at bedtime. 90 capsule 11     No current facility-administered medications on file prior to visit.        Medication List            Accurate as of July 9, 2025 11:30 AM. If you have any questions, ask your nurse or doctor.                CHANGE how you take these medications        8AM 10AM Noon 6PM 8PM Bedtime   predniSONE 5 mg tablet  Dose: 10 mg  What changed: how much to take  Take 2 tablets (10 mg) by mouth once daily.  Commonly known as: Deltasone  Changed by: Ghanshyam You           tacrolimus 0.5 mg capsule  Dose: 0.5 mg  What changed: See the new instructions.  Take 1 capsule (0.5 mg) by mouth " "2 times a day.  Commonly known as: Prograf  Changed by: Ghanshyam You                  CONTINUE taking these medications        8AM 10AM Noon 6PM 8PM Bedtime   acyclovir 400 mg tablet  Dose: 400 mg  Take 1 tablet (400 mg) by mouth 2 times a day.  Commonly known as: Zovirax           atovaquone 750 mg/5 mL suspension  Dose: 1,500 mg  Take 10 mL (1,500 mg) by mouth once daily.  Commonly known as: Mepron           BD Ultra-Fine Linda Pen Needle 32 gauge x 5/32\" needle  Dose: 1 each  Use 1 each 5 times a day.  Generic drug: pen needle, diabetic           carvedilol 25 mg tablet  Dose: 25 mg  Commonly known as: Coreg           clotrimazole 10 mg elissa  Dose: 10 mg  Use 1 tablet (10 mg) in the mouth or throat 3 times daily (morning, midday, late afternoon).  Commonly known as: Mycelex  Notes to patient: Do not chew, let dissolve completely. Do not eat or drink anything for 15 minutes after taking.           gabapentin 100 mg capsule  Dose: 100 mg  Take 1 capsule (100 mg) by mouth once daily.  Commonly known as: Neurontin           HumuLIN N NPH Insulin KwikPen 100 unit/mL (3 mL) pen  Dose: 12 Units  Inject 12 Units under the skin once every 24 hours. Take as directed per insulin instructions.  Generic drug: insulin NPH (Isophane)           hydrALAZINE 25 mg tablet  Dose: 25 mg  Take 1 tablet (25 mg) by mouth 3 times a day.  Commonly known as: Apresoline           insulin lispro 100 unit/mL pen  Dose: 0-10 Units  Inject 0-10 Units under the skin 3 times a day before meals. Take as directed per insulin instructions.  Hypoglycemia protocol Call LIP unit(s) if Blood Glucose is between 0 - 70 mg/d   0 unit(s) if Blood glucose is between    1 unit(s) if Blood glucose is between 201-250   2 unit(s) if Blood glucose is between 251-300   3 unit(s) if Bloodglucose is between 301-350   4 unit(s) if Blood glucose is between 351-400   If blood glucose is greater than 400 mg/dL, give max insulin per sliding scale AND then contact " provider.  Commonly known as: HumaLOG           letermovir 480 mg tablet  Dose: 480 mg  Take 1 tablet (480 mg) by mouth once daily.  Commonly known as: Prevymis           magnesium oxide 400 mg (241.3 mg elemental) tablet  Dose: 800 mg of magnesium oxide  Take 2 tablets by mouth 2 times a day.  Commonly known as: Mag-Ox  Notes to patient: Separate from mycophenolate by at least 2 hours           mycophenolate 250 mg capsule  Dose: 1,000 mg  Take 4 capsules (1,000 mg) by mouth 2 times a day.  Commonly known as: Cellcept           pantoprazole 40 mg EC tablet  Dose: 40 mg  Take 1 tablet (40 mg) by mouth once daily in the morning. Take before meals. Do not crush, chew, or split.  Commonly known as: ProtoNix           Phospha 250 Neutral tablet  Dose: 2 tablet  Take 2 tablets (500 mg) by mouth 2 times a day.  Generic drug: sod phos di, mono-K phos mono           Promacta 12.5 mg tablet  Dose: 12.5 mg  Take 1 tablet (12.5 mg) by mouth once daily in the morning. Take before meals. Take on an empty stomach, 1 hour before or 2 hours afer a meal.  Generic drug: eltrombopag olamine           rosuvastatin 10 mg tablet  Dose: 10 mg  Take 1 tablet (10 mg) by mouth once daily at bedtime.  Commonly known as: Crestor           traZODone 50 mg tablet  Dose: 1 tablet  Commonly known as: Desyrel           TRUEplus Lancets 33 gauge misc  Generic drug: lancets                  STOP taking these medications      aspirin 81 mg EC tablet  Stopped by: Pranit Chotai     furosemide 40 mg tablet  Commonly known as: Lasix  Stopped by: Pranit Chotai     polyethylene glycol 17 gram/dose powder  Commonly known as: Glycolax, Miralax  Stopped by: Pranit Chotai     potassium chloride CR 20 mEq ER tablet  Commonly known as: Klor-Con M20  Stopped by: Pranit Chotai               Where to Get Your Medications        These medications were sent to [facility], [place] - [address]  [address] [address]      Phone:  "[phone]   tacrolimus 0.5 mg capsule       These medications were sent to [facility]  [address], [address], [facility] [address]      Hours: 8AM to 6PM Mon-Fri, 8AM to 4PM Sat, 9AM to 1PM Sun Phone: [phone]   predniSONE 5 mg tablet       Post-Transplant Medication Discussion:    Pillbox  The medication pillbox was brought to clinic mostly unfilled. There were a few remaining clotrimazole and hydralazine tablets in the evening slots. Patient's son reported that her blood pressure is \"okay\" in the evenings so she does not always take the evening hydralazine.  Changes made during the clinic visit:  Stop potassium and furosemide - these bottles were removed from the patient's supply and disposed of in the [facility] pharmacy blue disposal box  Decrease prednisone to 10 mg daily - this was filled in the box  Decrease tacrolimus to 0.5 mg BID - the patient did not have enough tacrolimus 0.5 mg capsules left to fill for the entire week so to avoid confusion in explaining which slots it needed to be added to, PharmD did not fill any tacrolimus in the box, see below for instructions provided to patient's son  The medication pillbox was filled for the rest of the week with the exception of the following, which were clearly noted on the med list as \"add to box\" and this was verbally communicated to the patient's son as well:  Carvedilol, trazodone, and Promacta - patient's son said they have a supply at home  Tacrolimus 0.5 mg BID - patient's son was instructed to  the refill at [facility] today before leaving the hospital (refill was re-sent to [facility] after initially sent to [facility] pharmacy) and PharmD explained that it needed to be added to each of the morning and bedtime slots    Pharmacy Interventions  Medication list creation  Pillbox fill  Education  Facilitation of refills    Transplant Pharmacy Follow Up  Could check pillbox at next appointment once filled by the " patient's son, though he has demonstrated capability with filling independently    Time spent: 40 minutes    Misty Mejia, PharmD, BCTXP   Solid Organ Transplant Clinical Pharmacy Specialist

## 2025-07-09 NOTE — PROGRESS NOTES
TRANSPLANT SURGERY FOLLOW UP    Reason for visit:    Luis Panda underwent transplant surgery,  6/10/2025 (Kidney), and returns to clinic for follow up evaluation focusing on their current immunosuppression. Specifically, Luis Panda's regiment was evaluated to ensure adequate levels to prevent life threatening or organ function impairing rejection while not increasing risk of adverse effects including malignancy, infection, bone health, or accelerated cardiovascular disease.  I reviewed common side effects including tremor, hair loss, GI toxicity, and agitation.  The patient reported that they were experiencing: none.    The long-term management of maintenance immunosuppression in organ transplant recipients remains complex given differences in clinical characteristics, comorbid conditions, and prior rejection episodes.  These factors were evaluated at this visit and used in formulating this plan of care. Immunosuppression following the transplant is medically necessary to closely monitor and to reduce the risk of post-operative complications distinct from the post operative management of the transplant surgical procedure.     Interval history  Doing well    Problem List[1]    Medications:    Current Outpatient Medications   Medication Instructions    acyclovir (ZOVIRAX) 400 mg, oral, 2 times daily    [Paused] aspirin 81 mg EC tablet Take 1 tablet (81 mg) by mouth once daily.    atovaquone (MEPRON) 1,500 mg, oral, Daily    carvedilol (COREG) 25 mg, oral, 2 times daily (morning and late afternoon)    clotrimazole (MYCELEX) 10 mg, Mouth/Throat, 3 times daily (morning, midday, late afternoon)    furosemide (LASIX) 40 mg, oral, Daily    gabapentin (NEURONTIN) 100 mg, oral, Daily    HumuLIN N NPH Insulin KwikPen 12 Units, subcutaneous, Every 24 hours scheduled, Take as directed per insulin instructions.    hydrALAZINE (APRESOLINE) 25 mg, oral, 3 times daily    insulin lispro (HUMALOG) 0-10 Units,  "subcutaneous, 3 times daily before meals, Take as directed per insulin instructions.  Hypoglycemia protocol Call LIP unit(s) if Blood Glucose is between 0 - 70 mg/d   0 unit(s) if Blood glucose is between    1 unit(s) if Blood glucose is between 201-250   2 unit(s) if Blood glucose is between 251-300   3 unit(s) if Bloodglucose is between 301-350   4 unit(s) if Blood glucose is between 351-400   If blood glucose is greater than 400 mg/dL, give max insulin per sliding scale AND then contact provider.    letermovir (PREVYMIS) 480 mg, oral, Daily    magnesium oxide (Mag-Ox) 400 mg (241.3 mg elemental) tablet 2 tablets, oral, 2 times daily    mycophenolate (CELLCEPT) 1,000 mg, oral, 2 times daily    pantoprazole (PROTONIX) 40 mg, oral, Daily before breakfast, Do not crush, chew, or split.    pen needle, diabetic 32 gauge x 5/32\" needle Use 1 each 5 times a day.    polyethylene glycol (GLYCOLAX, MIRALAX) 17 g, oral, Daily    predniSONE (DELTASONE) 15 mg, oral, Daily    Promacta 12.5 mg, oral, Daily before breakfast, Take on an empty stomach, 1 hour before or 2 hours afer a meal.    rosuvastatin (CRESTOR) 10 mg, oral, Nightly    sod phos di, mono-K phos mono (K Phos Neutral) tablet 500 mg, oral, 2 times daily    tacrolimus (Prograf) 0.5 mg capsule Take 2 capsules (1 mg) by mouth once daily in the morning AND 1 capsule (0.5 mg) once daily at bedtime.    traZODone (Desyrel) 50 mg tablet 1 tablet, oral, Nightly    TRUEplus Lancets 33 gauge misc        Physical Exam  Vitals:    07/09/25 0959   BP: 148/77   Pulse: 69   Temp: 36.9 °C (98.5 °F)   SpO2: 98%      General: Alert and oriented to time, place and person. Not in distress  ENT: unremarkable  Cardiovascular: Regular rate, normotensive  Respiratory: no signs of labored breathing on room air  Abdomen/ GI: Kidney transplant incision well healed, staples in place   Extremity: BLE trace edema -  Skin: no rash      ALLERGY:  Allergies[2]    LABS:  No results found for " this or any previous visit (from the past 24 hours).   Lab Results   Component Value Date    ALT 26 07/01/2025    AST 27 07/01/2025    ALKPHOS 64 07/01/2025    BILITOT 1.1 07/01/2025         ASSESSMENT AND PLAN:    Ms. Panda is a 60 y.o. female who underwent  transplant surgery on 6/10/2025 (Kidney).    DCD  KDPI 73  PRA 19  Pre-dialysis  Good graft function    Recent re-admission for Thrombocytopenia- Promacta re-started, plt improved   Patient and Family reports not being on ASA 81 for over 3 years, will hold    1. Immunosuppression reviewed and adjusted       Tacrolimus: Yes - 0.5 bid goal 8-10       Mycophenolate: Yes - 1000 mg bid      Prednisone: Yes - 10 mg    2. Prophylaxis adherence protocol to prevent immunosuppression related infection      Acyclovir, Letermovir (ITP), ATQ    3. Hypertension         Blood Pressures         7/9/2025  0959             BP: 148/77              Current antihypertensives: Coreg 25 bid, hydralazine 25 tid   (Amlodipine off due to peripheral edema)            4. Wound/ULI      Amaya ready for removal: Yes -      5. GI prophylaxis       On PPI     6. ITP - promacta restarted, follow-up with Heme, no indication for ASA 81 (was not on it for over last 3 years)     7. Follow up:       Labs  2 times per week       RTC: 2 week(s)    I spent a total of 32 min providing care for this patient including record review, examination, face to face counseling, and documentation.     Ghanshyam You MD         [1]   Patient Active Problem List  Diagnosis    Age-related nuclear cataract of both eyes    Anemia    Arteriovenous fistula    Cerebral aneurysm, nonruptured ([facility])    Diabetes mellitus with chronic kidney disease (Multi)    Essential hypertension    Gastroesophageal reflux disease without esophagitis    Insomnia    Left arm swelling    Memory loss    Mixed hyperlipidemia    Obesity, Class I, BMI 30-34.9    Platelets decreased    Polycystic kidney, unspecified    Presbyopia     Right flank pain    Type 2 diabetes mellitus without complication, without long-term current use of insulin    Vision impairment    PONV (postoperative nausea and vomiting)    Kidney replaced by transplant ([facility])    CLL (chronic lymphocytic leukemia) (Multi)    Thrombocytopenia   [2]   Allergies  Allergen Reactions    Amlodipine Other     LEG SWELLING    Oxycodone Other     Dizziness, weakness

## 2025-07-10 ENCOUNTER — PATIENT OUTREACH (OUTPATIENT)
Dept: CARE COORDINATION | Age: 60
End: 2025-07-10

## 2025-07-10 ENCOUNTER — OFFICE VISIT (OUTPATIENT)
Facility: HOSPITAL | Age: 60
End: 2025-07-10
Payer: MEDICAID

## 2025-07-10 DIAGNOSIS — R73.9 STEROID-INDUCED HYPERGLYCEMIA: ICD-10-CM

## 2025-07-10 DIAGNOSIS — Z94.0 KIDNEY REPLACED BY TRANSPLANT (HHS-HCC): ICD-10-CM

## 2025-07-10 DIAGNOSIS — T38.0X5A STEROID-INDUCED HYPERGLYCEMIA: ICD-10-CM

## 2025-07-10 DIAGNOSIS — E11.9 TYPE 2 DIABETES MELLITUS WITHOUT COMPLICATION, WITHOUT LONG-TERM CURRENT USE OF INSULIN: Primary | Chronic | ICD-10-CM

## 2025-07-10 PROBLEM — E66.811 OBESITY, CLASS I, BMI 30-34.9: Status: RESOLVED | Noted: 2019-02-20 | Resolved: 2025-07-10

## 2025-07-10 PROCEDURE — 99214 OFFICE O/P EST MOD 30 MIN: CPT | Performed by: PHYSICIAN ASSISTANT

## 2025-07-10 PROCEDURE — 99214 OFFICE O/P EST MOD 30 MIN: CPT | Mod: GC,GT | Performed by: PHYSICIAN ASSISTANT

## 2025-07-10 PROCEDURE — 1036F TOBACCO NON-USER: CPT | Performed by: PHYSICIAN ASSISTANT

## 2025-07-10 PROCEDURE — 95251 CONT GLUC MNTR ANALYSIS I&R: CPT | Performed by: PHYSICIAN ASSISTANT

## 2025-07-10 PROCEDURE — 3044F HG A1C LEVEL LT 7.0%: CPT | Performed by: PHYSICIAN ASSISTANT

## 2025-07-10 RX ORDER — INSULIN LISPRO 100 [IU]/ML
0-10 INJECTION, SOLUTION INTRAVENOUS; SUBCUTANEOUS
Qty: 15 ML | Refills: 2 | Status: SHIPPED | OUTPATIENT
Start: 2025-07-10 | End: 2025-12-07

## 2025-07-10 RX ORDER — PEN NEEDLE, DIABETIC 30 GX3/16"
1 NEEDLE, DISPOSABLE MISCELLANEOUS
Qty: 100 EACH | Refills: 11 | Status: SHIPPED | OUTPATIENT
Start: 2025-07-10 | End: 2025-08-09

## 2025-07-10 RX ORDER — BLOOD-GLUCOSE SENSOR
EACH MISCELLANEOUS
Qty: 3 EACH | Refills: 11 | Status: SHIPPED | OUTPATIENT
Start: 2025-07-10

## 2025-07-10 NOTE — PROGRESS NOTES
Subjective   Luis Panda is a 60 y.o. female who presents for follow-up of Type 2 diabetes mellitus. The initial diagnosis of diabetes was made in 2021. The patient does not have a known family history of diabetes.    Known complications due to diabetes included cerebrovascular disease and chronic kidney disease    Cardiovascular risk factors include diabetes mellitus. The patient is not on an ACE inhibitor or angiotensin II receptor blocker.  The patient has not been previously hospitalized due to diabetic ketoacidosis.     Current symptoms/problems include hyperglycemia. Her clinical course has fluctuated.     Current diabetes regimen is as follows: NPH 12u qAM, Lispro 1u:50>200mg/dL ssi, dexcom G7 CGM     The patient is currently checking the blood glucose 5+ times per day.    Patient is using: continuous glucose monitor        Hypoglycemia frequency: 0%  Hypoglycemia awareness: Yes     Exercise: never - limited by recent kidney allograft transplantation  Meal panning: She is using avoidance of concentrated sweets. Pt's family had several questions about dietary targets and foods to avoid to improve glucose control - all questions answered     Review of Systems   Reason unable to perform ROS: pt relies on son for translation.   All other systems reviewed and are negative.      Objective   There were no vitals taken for this visit.  Physical Exam  Constitutional:       Appearance: Normal appearance. She is normal weight.      Comments: Appears to be resting comfortably on video chat   Pulmonary:      Effort: Pulmonary effort is normal.   Neurological:      Mental Status: She is alert and oriented to person, place, and time.   Psychiatric:         Mood and Affect: Mood normal.         Behavior: Behavior normal.         Lab Review  Glucose (mg/dL)   Date Value   07/07/2025 115 (H)   07/02/2025 178 (H)   07/01/2025 209 (H)     Hemoglobin A1C (%)   Date Value   06/12/2025 5.6   12/09/2022 5.4   03/15/2022 5.4  "  08/18/2021 6.2 (H)     POCT Hemoglobin A1C (%)   Date Value   02/20/2025 5.9 (A)   03/28/2022 5.5     Bicarbonate (mmol/L)   Date Value   07/07/2025 26   07/02/2025 29   07/01/2025 30     Urea Nitrogen (mg/dL)   Date Value   07/07/2025 23   07/02/2025 22   07/01/2025 23     Creatinine (mg/dL)   Date Value   07/07/2025 1.41 (H)   07/02/2025 1.35 (H)   07/01/2025 1.32 (H)       Health Maintenance:   Foot Exam: due for update at inperson appointment  Eye Exam: due for update  Lipid Panel: due for update, ordered for 6 months post transplant  Urine Albumin: due for update, ordered for 6 months post transplant    Assessment/Plan   Diagnoses and all orders for this visit:  Type 2 diabetes mellitus without complication, without long-term current use of insulin  -     Lipid Panel; Future  -     Albumin-Creatinine Ratio, Urine Random; Future  Kidney replaced by transplant ([facility])  -     insulin lispro (HumaLOG) 100 unit/mL pen; Inject 0-10 Units under the skin 3 times a day before meals. Inject as per sliding scale before meals 3x/day. 151-200=1u, 201-250=2u,251-300=3u,301-350=4u,351-400=5u, if glucose is above 400, repeat 5 units every 4 hours. Expect up to 20 units total per day  -     insulin NPH, Isophane, (HumuLIN N,NovoLIN N) 100 unit/mL (3 mL) pen; Inject 15 Units under the skin once every 24 hours. Take as directed per insulin instructions.  -     pen needle, diabetic 32 gauge x 5/32\" needle; 1 each 5 times a day.  -     Lipid Panel; Future  -     Albumin-Creatinine Ratio, Urine Random; Future  Steroid-induced hyperglycemia  -     insulin lispro (HumaLOG) 100 unit/mL pen; Inject 0-10 Units under the skin 3 times a day before meals. Inject as per sliding scale before meals 3x/day. 151-200=1u, 201-250=2u,251-300=3u,301-350=4u,351-400=5u, if glucose is above 400, repeat 5 units every 4 hours. Expect up to 20 units total per day  -     insulin NPH, Isophane, (HumuLIN N,NovoLIN N) 100 unit/mL (3 mL) pen; Inject 15 " Units under the skin once every 24 hours. Take as directed per insulin instructions.      Type 2 diabetes mellitus, is not at goal. A1C due for update in late August 2025    RX changes: please see updated insulin plan below    Education:  interpretation of lab results, blood sugar goals, self-monitoring of blood glucose skills, and insulin adjustments  Follow up: I recommend diabetes care be 4-6 weeks virtually.    INSULIN INSTRUCTIONS    HUMULIN (NPH) = 15 units once every morning    HUMALOG - take sliding scale 15 minutes before eating  SLIDING SCALE    = 0u  151-200 = 1u  201-250 = 2u  251-300 = 3u  301-350 = 4u  351-400 = 5u  Over 400 = repeat 5u every 4 hours     Son's email : [email or web address]

## 2025-07-10 NOTE — PATIENT INSTRUCTIONS
"    Type 2 diabetes mellitus without complication, without long-term current use of insulin  -     Lipid Panel; Future  -     Albumin-Creatinine Ratio, Urine Random; Future  Kidney replaced by transplant ([facility])  -     insulin lispro (HumaLOG) 100 unit/mL pen; Inject 0-10 Units under the skin 3 times a day before meals. Inject as per sliding scale before meals 3x/day. 151-200=1u, 201-250=2u,251-300=3u,301-350=4u,351-400=5u, if glucose is above 400, repeat 5 units every 4 hours. Expect up to 20 units total per day  -     insulin NPH, Isophane, (HumuLIN N,NovoLIN N) 100 unit/mL (3 mL) pen; Inject 15 Units under the skin once every 24 hours. Take as directed per insulin instructions.  -     pen needle, diabetic 32 gauge x 5/32\" needle; 1 each 5 times a day.  -     Lipid Panel; Future  -     Albumin-Creatinine Ratio, Urine Random; Future  Steroid-induced hyperglycemia  -     insulin lispro (HumaLOG) 100 unit/mL pen; Inject 0-10 Units under the skin 3 times a day before meals. Inject as per sliding scale before meals 3x/day. 151-200=1u, 201-250=2u,251-300=3u,301-350=4u,351-400=5u, if glucose is above 400, repeat 5 units every 4 hours. Expect up to 20 units total per day  -     insulin NPH, Isophane, (HumuLIN N,NovoLIN N) 100 unit/mL (3 mL) pen; Inject 15 Units under the skin once every 24 hours. Take as directed per insulin instructions.      Type 2 diabetes mellitus, is not at goal. A1C due for update in late August 2025    RX changes: please see updated insulin plan below    Education:  interpretation of lab results, blood sugar goals, self-monitoring of blood glucose skills, and insulin adjustments  Follow up: I recommend diabetes care be 4-6 weeks virtually.    INSULIN INSTRUCTIONS    HUMULIN (NPH) = 15 units once every morning    HUMALOG - take sliding scale 15 minutes before eating  SLIDING SCALE    = 0u  151-200 = 1u  201-250 = 2u  251-300 = 3u  301-350 = 4u  351-400 = 5u  Over 400 = repeat 5u every 4 " hours

## 2025-07-11 ENCOUNTER — PATIENT OUTREACH (OUTPATIENT)
Dept: CARE COORDINATION | Age: 60
End: 2025-07-11
Payer: MEDICAID

## 2025-07-11 DIAGNOSIS — Z94.0 KIDNEY REPLACED BY TRANSPLANT (HHS-HCC): ICD-10-CM

## 2025-07-11 NOTE — PROGRESS NOTES
Spoke to patient. Transplant Telehealth goals met. Incision healing, appetite good, stools normal. Denies issues or concerns. Equipment PU 7/15/2025, Added to EMS schedule, DC'd from [facility] Transplant Telehealth done

## 2025-07-13 ENCOUNTER — TELEPHONE (OUTPATIENT)
Facility: HOSPITAL | Age: 60
End: 2025-07-13
Payer: MEDICAID

## 2025-07-13 DIAGNOSIS — Z94.0 KIDNEY REPLACED BY TRANSPLANT (HHS-HCC): ICD-10-CM

## 2025-07-14 DIAGNOSIS — Z94.0 KIDNEY REPLACED BY TRANSPLANT (HHS-HCC): ICD-10-CM

## 2025-07-14 NOTE — TELEPHONE ENCOUNTER
ON CALL NOTE - Received call from [facility]  Luis's temp was 99.7 F and had some chills. /82 HR 90. No s/sx UTI. No N/V/D. Tolerating PO. No Pain. No respiratory issues. Addressed with Dr. Adkins. Cont to monitor. If fever goes up will need further management. Discussed with Zach Smith to cont Fluids and follow up with scheduled labs. He was advised if temp goes up or sx worsen to call.        UA added to next set of labs.

## 2025-07-15 DIAGNOSIS — Z94.0 KIDNEY REPLACED BY TRANSPLANT (HHS-HCC): ICD-10-CM

## 2025-07-16 ENCOUNTER — APPOINTMENT (OUTPATIENT)
Facility: HOSPITAL | Age: 60
End: 2025-07-16
Payer: MEDICAID

## 2025-07-18 DIAGNOSIS — Z94.0 KIDNEY REPLACED BY TRANSPLANT (HHS-HCC): ICD-10-CM

## 2025-07-18 NOTE — PATIENT INSTRUCTIONS
Medication Changes:  Increase tacrolimus. Take 4mg tonight then 2mg twice a day starting tomorrow  Start aspirin 81mg every day  Start lasix 20mg every day    Plan:  Labs Friday then back to once a week  The next time you go to the lab, please ask them to draw your usual standing orders plus BK PCR, EBV PCR, CMV PCR, Donor HIV/HCV/HBV NATs, urine histo, and urine protein. These are routine checks we do in the first year after transplant as well.  Next clinic appt in 2 weeks

## 2025-07-21 ENCOUNTER — LAB (OUTPATIENT)
Dept: LAB | Facility: HOSPITAL | Age: 60
End: 2025-07-21
Payer: MEDICAID

## 2025-07-21 DIAGNOSIS — Z94.0 KIDNEY TRANSPLANT STATUS: Primary | ICD-10-CM

## 2025-07-21 LAB
ALBUMIN SERPL BCP-MCNC: 3 G/DL (ref 3.4–5)
ANION GAP SERPL CALC-SCNC: 13 MMOL/L (ref 10–20)
BUN SERPL-MCNC: 14 MG/DL (ref 6–23)
CALCIUM SERPL-MCNC: 8.3 MG/DL (ref 8.6–10.3)
CHLORIDE SERPL-SCNC: 106 MMOL/L (ref 98–107)
CO2 SERPL-SCNC: 26 MMOL/L (ref 21–32)
CREAT SERPL-MCNC: 0.99 MG/DL (ref 0.5–1.05)
EGFRCR SERPLBLD CKD-EPI 2021: 65 ML/MIN/1.73M*2
ERYTHROCYTE [DISTWIDTH] IN BLOOD BY AUTOMATED COUNT: 16.5 % (ref 11.5–14.5)
GLUCOSE SERPL-MCNC: 164 MG/DL (ref 74–99)
HCT VFR BLD AUTO: 25.8 % (ref 36–46)
HGB BLD-MCNC: 8.3 G/DL (ref 12–16)
MAGNESIUM SERPL-MCNC: 1.5 MG/DL (ref 1.6–2.4)
MCH RBC QN AUTO: 32.8 PG (ref 26–34)
MCHC RBC AUTO-ENTMCNC: 32.2 G/DL (ref 32–36)
MCV RBC AUTO: 102 FL (ref 80–100)
NRBC BLD-RTO: 0 /100 WBCS (ref 0–0)
PHOSPHATE SERPL-MCNC: 1.9 MG/DL (ref 2.5–4.9)
PLATELET # BLD AUTO: 169 X10*3/UL (ref 150–450)
POTASSIUM SERPL-SCNC: 3.5 MMOL/L (ref 3.5–5.3)
RBC # BLD AUTO: 2.53 X10*6/UL (ref 4–5.2)
SODIUM SERPL-SCNC: 141 MMOL/L (ref 136–145)
TACROLIMUS BLD-MCNC: 3.4 NG/ML
WBC # BLD AUTO: 7 X10*3/UL (ref 4.4–11.3)

## 2025-07-21 PROCEDURE — 80197 ASSAY OF TACROLIMUS: CPT

## 2025-07-21 PROCEDURE — 85027 COMPLETE CBC AUTOMATED: CPT

## 2025-07-21 PROCEDURE — 80069 RENAL FUNCTION PANEL: CPT

## 2025-07-21 PROCEDURE — 36415 COLL VENOUS BLD VENIPUNCTURE: CPT

## 2025-07-21 PROCEDURE — 83735 ASSAY OF MAGNESIUM: CPT

## 2025-07-22 DIAGNOSIS — Z94.0 KIDNEY REPLACED BY TRANSPLANT (HHS-HCC): ICD-10-CM

## 2025-07-22 LAB
ALLOSURE SCORE - KIDNEY: 0.32 %
CAREDX_ORDER_ID: NORMAL
CENTER_ORDER_ID: NORMAL
CLIENT SPECIMEN ID - ALLOSURE: NORMAL
DONOR RELATION - ALLOSURE: NORMAL
NOTES - ALLOSURE: NORMAL
RELATIVE CHANGE VALUE - KIDNEY: NORMAL
TEST COMMENTS - ALLOSURE: NORMAL
TIME POST TX - ALLOSURE: NORMAL
TRANSPLANTED ORGAN - ALLOSURE: NORMAL
TX DATE - ALLOSURE/ALLOMAP: NORMAL
WP_ORDER_ID: NORMAL

## 2025-07-23 ENCOUNTER — APPOINTMENT (OUTPATIENT)
Facility: HOSPITAL | Age: 60
End: 2025-07-23
Payer: MEDICAID

## 2025-07-23 ENCOUNTER — PHARMACY VISIT (OUTPATIENT)
Dept: PHARMACY | Facility: CLINIC | Age: 60
End: 2025-07-23
Payer: MEDICAID

## 2025-07-23 VITALS
OXYGEN SATURATION: 98 % | WEIGHT: 152.9 LBS | TEMPERATURE: 98.4 F | SYSTOLIC BLOOD PRESSURE: 160 MMHG | DIASTOLIC BLOOD PRESSURE: 78 MMHG | BODY MASS INDEX: 25.44 KG/M2 | HEART RATE: 73 BPM

## 2025-07-23 DIAGNOSIS — E83.42 HYPOMAGNESEMIA: ICD-10-CM

## 2025-07-23 DIAGNOSIS — Z94.0 IMMUNOSUPPRESSIVE MANAGEMENT ENCOUNTER FOLLOWING KIDNEY TRANSPLANT: Primary | ICD-10-CM

## 2025-07-23 DIAGNOSIS — Z91.89 AT RISK FOR VENOUS THROMBOEMBOLISM: ICD-10-CM

## 2025-07-23 DIAGNOSIS — Z94.0 KIDNEY REPLACED BY TRANSPLANT (HHS-HCC): ICD-10-CM

## 2025-07-23 DIAGNOSIS — Z79.899 IMMUNOSUPPRESSIVE MANAGEMENT ENCOUNTER FOLLOWING KIDNEY TRANSPLANT: Primary | ICD-10-CM

## 2025-07-23 PROBLEM — D69.6 PLATELETS DECREASED: Status: RESOLVED | Noted: 2023-12-27 | Resolved: 2025-07-23

## 2025-07-23 PROBLEM — D69.6 THROMBOCYTOPENIA: Status: RESOLVED | Noted: 2025-07-01 | Resolved: 2025-07-23

## 2025-07-23 PROCEDURE — 99214 OFFICE O/P EST MOD 30 MIN: CPT | Mod: 24

## 2025-07-23 PROCEDURE — RXMED WILLOW AMBULATORY MEDICATION CHARGE

## 2025-07-23 RX ORDER — TACROLIMUS 0.5 MG/1
1 CAPSULE ORAL 2 TIMES DAILY
Qty: 120 CAPSULE | Refills: 11 | Status: SHIPPED | OUTPATIENT
Start: 2025-07-23 | End: 2026-07-23

## 2025-07-23 RX ORDER — ASPIRIN 81 MG/1
81 TABLET ORAL DAILY
Qty: 30 TABLET | Refills: 11 | Status: SHIPPED | OUTPATIENT
Start: 2025-07-23 | End: 2026-07-23

## 2025-07-23 RX ORDER — FUROSEMIDE 20 MG/1
40 TABLET ORAL DAILY
Qty: 60 TABLET | Refills: 11 | Status: SHIPPED | OUTPATIENT
Start: 2025-07-23 | End: 2026-07-23

## 2025-07-23 RX ORDER — LANOLIN ALCOHOL/MO/W.PET/CERES
800 CREAM (GRAM) TOPICAL 2 TIMES DAILY
Qty: 120 TABLET | Refills: 2 | Status: SHIPPED | OUTPATIENT
Start: 2025-07-23 | End: 2025-10-21

## 2025-07-23 ASSESSMENT — PAIN SCALES - GENERAL: PAINLEVEL_OUTOF10: 3

## 2025-07-23 NOTE — PROGRESS NOTES
TRANSPLANT SURGERY FOLLOW UP    Reason for visit:    Luis Panda underwent transplant surgery,  6/10/2025 (Kidney), and returns to clinic for follow up evaluation focusing on their current immunosuppression. Specifically, Luis Panda's regiment was evaluated to ensure adequate levels to prevent life threatening or organ function impairing rejection while not increasing risk of adverse effects including malignancy, infection, bone health, or accelerated cardiovascular disease.  I reviewed common side effects including tremor, hair loss, GI toxicity, and agitation.  The patient reported that they were experiencing: none.    The long-term management of maintenance immunosuppression in organ transplant recipients remains complex given differences in clinical characteristics, comorbid conditions, and prior rejection episodes.  These factors were evaluated at this visit and used in formulating this plan of care. Immunosuppression following the transplant is medically necessary to closely monitor and to reduce the risk of post-operative complications distinct from the post operative management of the transplant surgical procedure.     Interval history  Doing well, leg swelling  Minimal pain  Taking her medications though Tac is low.     Problem List[1]    Medications:    Current Outpatient Medications   Medication Instructions    acyclovir (ZOVIRAX) 400 mg, oral, 2 times daily    aspirin 81 mg, oral, Daily    atovaquone (MEPRON) 1,500 mg, oral, Daily    blood-glucose sensor (Dexcom G7 Sensor) device Use to check glucose, change every 10 days    carvedilol (COREG) 25 mg, oral, 2 times daily (morning and late afternoon)    clotrimazole (MYCELEX) 10 mg, Mouth/Throat, 3 times daily (morning, midday, late afternoon)    furosemide (LASIX) 40 mg, oral, Daily    gabapentin (NEURONTIN) 100 mg, oral, Daily    hydrALAZINE (APRESOLINE) 25 mg, oral, 3 times daily    insulin lispro (HUMALOG) 0-10 Units, subcutaneous, 3 times  "daily before meals, Inject as per sliding scale before meals 3x/day. 151-200=1u, 201-250=2u,251-300=3u,301-350=4u,351-400=5u, if glucose is above 400, repeat 5 units every 4 hours. Expect up to 20 units total per day    insulin NPH (Isophane) (HUMULIN N,NOVOLIN N) 15 Units, subcutaneous, Every 24 hours scheduled, Take as directed per insulin instructions.    magnesium oxide (Mag-Ox) 400 mg (241.3 mg elemental) tablet 2 tablets, oral, 2 times daily    mycophenolate (CELLCEPT) 1,000 mg, oral, 2 times daily    pantoprazole (PROTONIX) 40 mg, oral, Daily before breakfast, Do not crush, chew, or split.    pen needle, diabetic 32 gauge x 5/32\" needle 1 each, miscellaneous, 5 times daily    predniSONE (DELTASONE) 10 mg, oral, Daily    Prevymis 480 mg, oral, Daily    Promacta 12.5 mg, oral, Daily before breakfast, Take on an empty stomach, 1 hour before or 2 hours afer a meal.    rosuvastatin (CRESTOR) 10 mg, oral, Nightly    sod phos di, mono-K phos mono (K Phos Neutral) tablet 500 mg, oral, 2 times daily    tacrolimus (PROGRAF) 1 mg, oral, 2 times daily    traZODone (Desyrel) 50 mg tablet 1 tablet, oral, Nightly    TRUEplus Lancets 33 gauge misc        Physical Exam  Vitals:    07/23/25 1126   BP: 160/78   Pulse: 73   Temp: 36.9 °C (98.4 °F)   SpO2: 98%      General: Alert and oriented to time, place and person. Not in distress  ENT: unremarkable  Cardiovascular: Regular rate, normotensive  Respiratory: no signs of labored breathing on room air  Abdomen/ GI: Kidney transplant incision well healed, staples removed  Extremity: BLE 1+ edema  Skin: no rash      ALLERGY:  Allergies[2]    LABS:  No results found for this or any previous visit (from the past 24 hours).   Lab Results   Component Value Date    ALT 26 07/01/2025    AST 27 07/01/2025    ALKPHOS 64 07/01/2025    BILITOT 1.1 07/01/2025         ASSESSMENT AND PLAN:    Ms. Panda is a 60 y.o. female who underwent  transplant surgery on 6/10/2025 " (Kidney).    DCD  KDPI 73  PRA 19  Pre-dialysis  Good graft function    1. Immunosuppression medication reviewed and adjusted       Tacrolimus: Yes - 0.5 bid goal 8-10, level low.  Give 2 mg tonight and increase to 1 mg BID      Mycophenolate: Yes - 1000 mg bid, decrease to 750 once tac is therapeutic.       Prednisone: Yes - 10 mg, level at 10 until tac therapeutic.     2. Prophylaxis adherence protocol to prevent immunosuppression related infection      Acyclovir, Letermovir (ITP), ATQ    3. Hypertension         Blood Pressures         7/23/2025  1126             BP: 160/78              Current antihypertensives: Coreg 25 bid, hydralazine 25 tid., restart lasix            4. Wound/ULI      Amaya ready for removal: N/A    5. GI prophylaxis       On PPI     6. ITP - promacta restarted, follow-up with Heme, restart 81 mg per heme    7. Follow up:       Labs  2 times per week       RTC: 2 week(s)    I spent a total of 35 min providing care for this patient including record review, examination, face to face counseling, and documentation.     Geovanny Santiago MD         [1]   Patient Active Problem List  Diagnosis    Age-related nuclear cataract of both eyes    Anemia    Arteriovenous fistula    Cerebral aneurysm, nonruptured ([facility])    Diabetes mellitus with chronic kidney disease (Multi)    Essential hypertension    Gastroesophageal reflux disease without esophagitis    Insomnia    Left arm swelling    Memory loss    Mixed hyperlipidemia    Platelets decreased    Polycystic kidney, unspecified    Presbyopia    Right flank pain    Type 2 diabetes mellitus without complication, without long-term current use of insulin    Vision impairment    PONV (postoperative nausea and vomiting)    Kidney replaced by transplant ([facility])    CLL (chronic lymphocytic leukemia) (Multi)    Thrombocytopenia   [2]   Allergies  Allergen Reactions    Amlodipine Other     LEG SWELLING    Oxycodone Other     Dizziness, weakness

## 2025-07-25 DIAGNOSIS — Z94.0 KIDNEY REPLACED BY TRANSPLANT (HHS-HCC): ICD-10-CM

## 2025-07-28 ENCOUNTER — LAB (OUTPATIENT)
Dept: LAB | Facility: HOSPITAL | Age: 60
End: 2025-07-28
Payer: MEDICAID

## 2025-07-28 DIAGNOSIS — Z94.0 KIDNEY TRANSPLANT STATUS: Primary | ICD-10-CM

## 2025-07-28 DIAGNOSIS — Z94.0 KIDNEY REPLACED BY TRANSPLANT (HHS-HCC): ICD-10-CM

## 2025-07-28 LAB
ALBUMIN SERPL BCP-MCNC: 3.5 G/DL (ref 3.4–5)
ANION GAP SERPL CALC-SCNC: 12 MMOL/L (ref 10–20)
BUN SERPL-MCNC: 15 MG/DL (ref 6–23)
CALCIUM SERPL-MCNC: 8.9 MG/DL (ref 8.6–10.3)
CHLORIDE SERPL-SCNC: 103 MMOL/L (ref 98–107)
CO2 SERPL-SCNC: 28 MMOL/L (ref 21–32)
CREAT SERPL-MCNC: 0.91 MG/DL (ref 0.5–1.05)
EGFRCR SERPLBLD CKD-EPI 2021: 71 ML/MIN/1.73M*2
ERYTHROCYTE [DISTWIDTH] IN BLOOD BY AUTOMATED COUNT: 16.5 % (ref 11.5–14.5)
GLUCOSE SERPL-MCNC: 145 MG/DL (ref 74–99)
HCT VFR BLD AUTO: 30.2 % (ref 36–46)
HGB BLD-MCNC: 9.8 G/DL (ref 12–16)
MAGNESIUM SERPL-MCNC: 1.37 MG/DL (ref 1.6–2.4)
MCH RBC QN AUTO: 32.9 PG (ref 26–34)
MCHC RBC AUTO-ENTMCNC: 32.5 G/DL (ref 32–36)
MCV RBC AUTO: 101 FL (ref 80–100)
NRBC BLD-RTO: 0 /100 WBCS (ref 0–0)
PHOSPHATE SERPL-MCNC: 2.3 MG/DL (ref 2.5–4.9)
PLATELET # BLD AUTO: 331 X10*3/UL (ref 150–450)
POTASSIUM SERPL-SCNC: 3.6 MMOL/L (ref 3.5–5.3)
RBC # BLD AUTO: 2.98 X10*6/UL (ref 4–5.2)
SODIUM SERPL-SCNC: 139 MMOL/L (ref 136–145)
TACROLIMUS BLD-MCNC: 8.4 NG/ML
WBC # BLD AUTO: 11.4 X10*3/UL (ref 4.4–11.3)

## 2025-07-28 PROCEDURE — 36415 COLL VENOUS BLD VENIPUNCTURE: CPT

## 2025-07-28 PROCEDURE — 85027 COMPLETE CBC AUTOMATED: CPT

## 2025-07-28 PROCEDURE — RXMED WILLOW AMBULATORY MEDICATION CHARGE

## 2025-07-28 PROCEDURE — 80069 RENAL FUNCTION PANEL: CPT

## 2025-07-28 PROCEDURE — 83735 ASSAY OF MAGNESIUM: CPT

## 2025-07-28 PROCEDURE — 80197 ASSAY OF TACROLIMUS: CPT

## 2025-07-29 ENCOUNTER — PHARMACY VISIT (OUTPATIENT)
Dept: PHARMACY | Facility: CLINIC | Age: 60
End: 2025-07-29
Payer: MEDICAID

## 2025-07-29 DIAGNOSIS — Z94.0 KIDNEY REPLACED BY TRANSPLANT (HHS-HCC): ICD-10-CM

## 2025-07-31 ENCOUNTER — TELEPHONE (OUTPATIENT)
Facility: HOSPITAL | Age: 60
End: 2025-07-31
Payer: MEDICAID

## 2025-07-31 NOTE — TELEPHONE ENCOUNTER
Pt is not receiving tacrolimus from [facility], pt is filling medications at [facility].

## 2025-07-31 NOTE — TELEPHONE ENCOUNTER
Teena from [facility] called in regards to completing the patients compliance package, but in order to do so they need to confirm the patients Tacrolimus dose, and asked for a call back today at [phone].

## 2025-07-31 NOTE — TELEPHONE ENCOUNTER
[facility] [place] [facility] [address]  [address] [address]  Phone: [phone] Fax: [phone]          tacrolimus (Prograf) 0.5 mg capsule     Pharm looking to confirm prescription

## 2025-08-01 DIAGNOSIS — Z94.0 KIDNEY REPLACED BY TRANSPLANT (HHS-HCC): ICD-10-CM

## 2025-08-04 ENCOUNTER — LAB (OUTPATIENT)
Dept: LAB | Facility: HOSPITAL | Age: 60
End: 2025-08-04
Payer: MEDICAID

## 2025-08-04 DIAGNOSIS — Z94.0 KIDNEY TRANSPLANT STATUS: Primary | ICD-10-CM

## 2025-08-04 DIAGNOSIS — Z91.89 AT RISK FOR VENOUS THROMBOEMBOLISM: ICD-10-CM

## 2025-08-04 LAB
ALBUMIN SERPL BCP-MCNC: 3.3 G/DL (ref 3.4–5)
ANION GAP SERPL CALC-SCNC: 12 MMOL/L (ref 10–20)
BUN SERPL-MCNC: 19 MG/DL (ref 6–23)
CALCIUM SERPL-MCNC: 8.4 MG/DL (ref 8.6–10.3)
CHLORIDE SERPL-SCNC: 103 MMOL/L (ref 98–107)
CO2 SERPL-SCNC: 27 MMOL/L (ref 21–32)
CREAT SERPL-MCNC: 1.15 MG/DL (ref 0.5–1.05)
EGFRCR SERPLBLD CKD-EPI 2021: 54 ML/MIN/1.73M*2
ERYTHROCYTE [DISTWIDTH] IN BLOOD BY AUTOMATED COUNT: 15.5 % (ref 11.5–14.5)
GLUCOSE SERPL-MCNC: 310 MG/DL (ref 74–99)
HCT VFR BLD AUTO: 27.6 % (ref 36–46)
HGB BLD-MCNC: 8.7 G/DL (ref 12–16)
MAGNESIUM SERPL-MCNC: 1.28 MG/DL (ref 1.6–2.4)
MCH RBC QN AUTO: 32.2 PG (ref 26–34)
MCHC RBC AUTO-ENTMCNC: 31.5 G/DL (ref 32–36)
MCV RBC AUTO: 102 FL (ref 80–100)
NRBC BLD-RTO: 0 /100 WBCS (ref 0–0)
PHOSPHATE SERPL-MCNC: 2 MG/DL (ref 2.5–4.9)
PLATELET # BLD AUTO: 239 X10*3/UL (ref 150–450)
POTASSIUM SERPL-SCNC: 3.3 MMOL/L (ref 3.5–5.3)
RBC # BLD AUTO: 2.7 X10*6/UL (ref 4–5.2)
SODIUM SERPL-SCNC: 139 MMOL/L (ref 136–145)
WBC # BLD AUTO: 8.1 X10*3/UL (ref 4.4–11.3)

## 2025-08-04 PROCEDURE — 80069 RENAL FUNCTION PANEL: CPT

## 2025-08-04 PROCEDURE — 83735 ASSAY OF MAGNESIUM: CPT

## 2025-08-04 PROCEDURE — 85027 COMPLETE CBC AUTOMATED: CPT

## 2025-08-04 PROCEDURE — 36415 COLL VENOUS BLD VENIPUNCTURE: CPT

## 2025-08-04 RX ORDER — ASPIRIN 81 MG/1
81 TABLET ORAL DAILY
Qty: 30 TABLET | Refills: 11 | Status: SHIPPED | OUTPATIENT
Start: 2025-08-04 | End: 2026-08-04

## 2025-08-04 NOTE — TELEPHONE ENCOUNTER
Labs reviewed by Dr Jerson brower.    Call placed to pt's son, Bertram and informed him of the above. He verbalized understanding. Pt also due for clinic appt this week, Neph opening on 8/6 @ 1140. Pt's son agreeable to appt/date time. Requested Roque schedule.

## 2025-08-05 DIAGNOSIS — Z94.0 KIDNEY REPLACED BY TRANSPLANT (HHS-HCC): ICD-10-CM

## 2025-08-06 ENCOUNTER — OFFICE VISIT (OUTPATIENT)
Facility: HOSPITAL | Age: 60
End: 2025-08-06
Payer: MEDICAID

## 2025-08-06 VITALS
DIASTOLIC BLOOD PRESSURE: 79 MMHG | OXYGEN SATURATION: 96 % | SYSTOLIC BLOOD PRESSURE: 132 MMHG | BODY MASS INDEX: 24.16 KG/M2 | WEIGHT: 145.2 LBS | TEMPERATURE: 99.4 F | HEART RATE: 72 BPM

## 2025-08-06 DIAGNOSIS — Z94.0 KIDNEY REPLACED BY TRANSPLANT (HHS-HCC): ICD-10-CM

## 2025-08-06 DIAGNOSIS — D84.9 IMMUNOSUPPRESSION: Primary | ICD-10-CM

## 2025-08-06 PROCEDURE — 99214 OFFICE O/P EST MOD 30 MIN: CPT | Mod: 24 | Performed by: TRANSPLANT SURGERY

## 2025-08-06 PROCEDURE — 3078F DIAST BP <80 MM HG: CPT | Performed by: TRANSPLANT SURGERY

## 2025-08-06 PROCEDURE — 3075F SYST BP GE 130 - 139MM HG: CPT | Performed by: TRANSPLANT SURGERY

## 2025-08-06 PROCEDURE — 99214 OFFICE O/P EST MOD 30 MIN: CPT | Performed by: TRANSPLANT SURGERY

## 2025-08-06 PROCEDURE — RXMED WILLOW AMBULATORY MEDICATION CHARGE

## 2025-08-06 RX ORDER — ACYCLOVIR 400 MG/1
400 TABLET ORAL 2 TIMES DAILY
Qty: 60 TABLET | Refills: 2 | Status: SHIPPED | OUTPATIENT
Start: 2025-08-06 | End: 2025-11-04

## 2025-08-06 RX ORDER — PREDNISONE 5 MG/1
5 TABLET ORAL DAILY
Qty: 30 TABLET | Refills: 11 | Status: SHIPPED | OUTPATIENT
Start: 2025-08-06 | End: 2026-08-06

## 2025-08-06 RX ORDER — GABAPENTIN 100 MG/1
100 CAPSULE ORAL DAILY
Qty: 30 CAPSULE | Refills: 11 | Status: SHIPPED | OUTPATIENT
Start: 2025-08-06 | End: 2026-08-06

## 2025-08-06 ASSESSMENT — ENCOUNTER SYMPTOMS
HEMATURIA: 0
AGITATION: 0
CONFUSION: 0
ABDOMINAL PAIN: 0
DYSURIA: 0
COUGH: 0
FREQUENCY: 0
ABDOMINAL DISTENTION: 0
ADENOPATHY: 0
SHORTNESS OF BREATH: 0
CHILLS: 0
EYES NEGATIVE: 1
COLOR CHANGE: 0
DIZZINESS: 0
DIARRHEA: 0
HALLUCINATIONS: 0
WEAKNESS: 0
CONSTIPATION: 0
ARTHRALGIAS: 0
LIGHT-HEADEDNESS: 0
FEVER: 0

## 2025-08-06 NOTE — PATIENT INSTRUCTIONS
Patient complains about weakness, and fatigue.     Decreased Pred 5 mg daily  No other changes    Labs tomorrow after labs two time weekly  RTC in 1 week on Wednesday  Referral to PT

## 2025-08-06 NOTE — PROGRESS NOTES
Subjective   Luis Panda is a 60 y.o. female who underwent DDKT on 6/10/2025 (Kidney). Here today for follow-up.    Review of Systems   Constitutional:  Negative for chills and fever.   HENT: Negative.  Negative for congestion.    Eyes: Negative.    Respiratory:  Negative for cough and shortness of breath.    Cardiovascular:  Negative for chest pain.   Gastrointestinal:  Negative for abdominal distention, abdominal pain, constipation and diarrhea.   Endocrine: Negative for cold intolerance and heat intolerance.   Genitourinary:  Negative for dysuria, frequency, hematuria and urgency.   Musculoskeletal:  Negative for arthralgias.   Skin:  Negative for color change.   Allergic/Immunologic: Negative for environmental allergies.   Neurological:  Negative for dizziness, weakness and light-headedness.   Hematological:  Negative for adenopathy.   Psychiatric/Behavioral:  Negative for agitation, confusion and hallucinations.         Objective   Vitals:    08/06/25 1154   BP: 132/79   Pulse: 72   Temp: 37.4 °C (99.4 °F)   SpO2: 96%       Physical Exam  Constitutional:       Appearance: Normal appearance.     Eyes:      Pupils: Pupils are equal, round, and reactive to light.       Cardiovascular:      Rate and Rhythm: Normal rate.   Pulmonary:      Effort: Pulmonary effort is normal. No respiratory distress.   Abdominal:      General: There is no distension.      Palpations: Abdomen is soft.      Comments: Incision clean, dry, intact     Musculoskeletal:         General: Normal range of motion.      Right lower leg: No edema.      Left lower leg: No edema.     Skin:     General: Skin is warm and dry.     Neurological:      General: No focal deficit present.      Mental Status: She is alert and oriented to person, place, and time.     Psychiatric:         Mood and Affect: Mood normal.         Behavior: Behavior normal.         Lab Results   Component Value Date    WBC 8.1 08/04/2025    HGB 8.7 (L) 08/04/2025    HCT 27.6 (L)  08/04/2025     (H) 08/04/2025     08/04/2025     Lab Results   Component Value Date    GLUCOSE 310 (H) 08/04/2025    CALCIUM 8.4 (L) 08/04/2025     08/04/2025    K 3.3 (L) 08/04/2025    CO2 27 08/04/2025     08/04/2025    BUN 19 08/04/2025    CREATININE 1.15 (H) 08/04/2025     Lab Results   Component Value Date    ALT 26 07/01/2025    AST 27 07/01/2025    ALKPHOS 64 07/01/2025    BILITOT 1.1 07/01/2025         Current Outpatient Medications:     acyclovir (Zovirax) 400 mg tablet, Take 1 tablet (400 mg) by mouth 2 times a day., Disp: 60 tablet, Rfl: 2    aspirin 81 mg EC tablet, Take 1 tablet (81 mg) by mouth once daily., Disp: 30 tablet, Rfl: 11    atovaquone (Mepron) 750 mg/5 mL suspension, Take 10 mL (1,500 mg) by mouth once daily., Disp: 300 mL, Rfl: 5    blood-glucose sensor (Dexcom G7 Sensor) device, Use to check glucose, change every 10 days, Disp: 3 each, Rfl: 11    carvedilol (Coreg) 25 mg tablet, Take 1 tablet (25 mg) by mouth 2 times daily (morning and late afternoon)., Disp: , Rfl:     clotrimazole (Mycelex) 10 mg marcial, Use 1 tablet (10 mg) in the mouth or throat 3 times daily (morning, midday, late afternoon)., Disp: 90 Marcial, Rfl: 2    gabapentin (Neurontin) 100 mg capsule, Take 1 capsule (100 mg) by mouth once daily., Disp: 30 capsule, Rfl: 11    hydrALAZINE (Apresoline) 25 mg tablet, Take 1 tablet (25 mg) by mouth 3 times a day., Disp: 90 tablet, Rfl: 11    insulin lispro (HumaLOG) 100 unit/mL pen, Inject 0-10 Units under the skin 3 times a day before meals. Inject as per sliding scale before meals 3x/day. 151-200=1u, 201-250=2u,251-300=3u,301-350=4u,351-400=5u, if glucose is above 400, repeat 5 units every 4 hours. Expect up to 20 units total per day, Disp: 15 mL, Rfl: 2    insulin NPH, Isophane, (HumuLIN N,NovoLIN N) 100 unit/mL (3 mL) pen, Inject 15 Units under the skin once every 24 hours. Take as directed per insulin instructions., Disp: , Rfl:     letermovir  "(Prevymis) 480 mg tablet, Take 1 tablet (480 mg) by mouth once daily., Disp: 30 tablet, Rfl: 2    magnesium oxide (Mag-Ox) 400 mg (241.3 mg elemental) tablet, Take 2 tablets by mouth 2 times a day., Disp: 120 tablet, Rfl: 2    mycophenolate (Cellcept) 250 mg capsule, Take 4 capsules (1,000 mg) by mouth 2 times a day., Disp: 240 capsule, Rfl: 11    pantoprazole (ProtoNix) 40 mg EC tablet, Take 1 tablet (40 mg) by mouth once daily in the morning. Take before meals. Do not crush, chew, or split., Disp: 30 tablet, Rfl: 0    pen needle, diabetic 32 gauge x 5/32\" needle, 1 each 5 times a day., Disp: 100 each, Rfl: 11    predniSONE (Deltasone) 5 mg tablet, Take 2 tablets (10 mg) by mouth once daily., Disp: 60 tablet, Rfl: 11    rosuvastatin (Crestor) 10 mg tablet, Take 1 tablet (10 mg) by mouth once daily at bedtime., Disp: 90 tablet, Rfl: 3    sod phos di, mono-K phos mono (K Phos Neutral) tablet, Take 2 tablets (500 mg) by mouth 2 times a day., Disp: 120 tablet, Rfl: 1    tacrolimus (Prograf) 0.5 mg capsule, Take 2 capsules (1 mg) by mouth 2 times a day., Disp: 120 capsule, Rfl: 11    traZODone (Desyrel) 50 mg tablet, Take 1 tablet (50 mg) by mouth once daily at bedtime., Disp: , Rfl:     TRUEplus Lancets 33 gauge misc, , Disp: , Rfl:     eltrombopag olamine (Promacta) 12.5 mg tablet, Take 1 tablet (12.5 mg) by mouth once daily in the morning. Take before meals. Take on an empty stomach, 1 hour before or 2 hours afer a meal., Disp: 30 tablet, Rfl: 11      Assessment/Plan     6/10/2025 (Kidney)  KDPI  PRA    Kidney allograft function   Good allograft function    Immunosuppression    Tac 1/1, last level 8, need to get blood work tomorrow to check level   MMF 1000/1000   Pred 10, decrease to 5    PPX   Acyclovir, Letermovir, Mepron, Clotrimazole    ITP   On Pormacta, platelet better    Debilitated   Send referral for home PT   Encouraged family to help her to stay active and ambulate    RTC 1 week  Labs 2x/week           "

## 2025-08-06 NOTE — PROGRESS NOTES
Patient is sent at the request of Della Santo PA* for my opinion regarding diabetes.  My recommendations below will be communicated back to the requesting provider by way of shared medical record.    Recommendations:   Increase insulin NPH to 15 units once daily with morning prednisone dose  Continue humalog SS#1 but make sure to take this BEFORE meals  ________________________________________________________________________    Subjective   Past Medical History:  Problem List[1]      HPI:  Luis Panda is a 60 y.o. female with a PMH significant for T2DM, ESRD s/p kidney txp (6/10/25), HTN, HLD  Pt presents today for new patient visit with gabriel PharmD for Type 2 Diabetes Mellitus  Last seen by NINOSKA Kingsley while pt hopsitalized from 6/10-16 for kidney transplant, discharged on prednisone 20mg daily, NPH 12 units daily, and Humalgo SS#1 (>200) before meals  In the interim:  Saw endo on 7/10 where NPH increased from 12 --> 15 units daily and humalog adjusted to SS#1  Saw TXP on 8/6 where prednisone reduced to 10 --> 5mg daily    Today:   Appt completed w/ pt's son who helps manage medications  Reports self-adjusting NPH down d/t lower readings however no readings <70  Confirms prednisone dose reduction  Pt's son reports being unsure of BG goals  Pt's son also reports wanting to eventually get the pt off of insulin  Denies acute concerns    Diabetes Pharmacotherapy:   Insulin NPH 12 units daily  Pt stated that they saw lower readings (90-120s) then decided to decrease back from 15 --> 12 units daily ~2 weeks  Humalog SS#1 before meals  Taking ~2-3x/day  Forgets to take before the meal, is taking after the meal, denies fully missing doses  ~2 units per dose    Chronic prednisone: 5mg daily    Previously trialed meds:   Glipizide    Social:  Current diet: on average, 2-3 meals per day  Breakfast - rice pudding  Lunch - small meal; rice, lentils  Dinner - more of a snack; fruit or rice  Fluids - tea  "(black), water      Allergies:  Amlodipine and Oxycodone    Medication list:  Current Outpatient Medications   Medication Instructions    acyclovir (ZOVIRAX) 400 mg, oral, 2 times daily    aspirin 81 mg, oral, Daily    atovaquone (MEPRON) 1,500 mg, oral, Daily    blood-glucose sensor (Dexcom G7 Sensor) device Use to check glucose, change every 10 days    carvedilol (COREG) 25 mg, oral, 2 times daily (morning and late afternoon)    clotrimazole (MYCELEX) 10 mg, Mouth/Throat, 3 times daily (morning, midday, late afternoon)    gabapentin (NEURONTIN) 100 mg, oral, Daily    hydrALAZINE (APRESOLINE) 25 mg, oral, 3 times daily    insulin lispro (HUMALOG) 0-10 Units, subcutaneous, 3 times daily before meals, Inject as per sliding scale before meals 3x/day. 151-200=1u, 201-250=2u,251-300=3u,301-350=4u,351-400=5u, if glucose is above 400, repeat 5 units every 4 hours. Expect up to 20 units total per day    insulin NPH (Isophane) (HUMULIN N,NOVOLIN N) 15 Units, subcutaneous, Every 24 hours scheduled, Take as directed per insulin instructions.    magnesium oxide (Mag-Ox) 400 mg (241.3 mg elemental) tablet 2 tablets, oral, 2 times daily    mycophenolate (CELLCEPT) 1,000 mg, oral, 2 times daily    pantoprazole (PROTONIX) 40 mg, oral, Daily before breakfast, Do not crush, chew, or split.    pen needle, diabetic 32 gauge x 5/32\" needle 1 each, miscellaneous, 5 times daily    predniSONE (DELTASONE) 10 mg, oral, Daily    Prevymis 480 mg, oral, Daily    Promacta 12.5 mg, oral, Daily before breakfast, Take on an empty stomach, 1 hour before or 2 hours afer a meal.    rosuvastatin (CRESTOR) 10 mg, oral, Nightly    sod phos di, mono-K phos mono (K Phos Neutral) tablet 500 mg, oral, 2 times daily    tacrolimus (PROGRAF) 1 mg, oral, 2 times daily    traZODone (Desyrel) 50 mg tablet 1 tablet, oral, Nightly    TRUEplus Lancets 33 gauge misc         Objective   Last Recorded Vitals:  BP Readings from Last 3 Encounters:   07/23/25 160/78 " "  07/09/25 148/77   07/07/25 119/62     Wt Readings from Last 3 Encounters:   07/23/25 69.4 kg (152 lb 14.4 oz)   07/09/25 65.6 kg (144 lb 9.6 oz)   07/01/25 67.6 kg (149 lb)     BMI Readings from Last 1 Encounters:   07/23/25 25.44 kg/m²      Labs  A1C  Lab Results   Component Value Date    HGBA1C 5.6 06/12/2025    HGBA1C 5.9 (A) 02/20/2025    HGBA1C 5.4 12/09/2022     BMP/LFTs  Lab Results   Component Value Date    CREATININE 1.15 (H) 08/04/2025    CREATININE 0.91 07/28/2025    CREATININE 0.99 07/21/2025    EGFR 54 (L) 08/04/2025    EGFR 71 07/28/2025    EGFR 65 07/21/2025    GLUCOSE 310 (H) 08/04/2025     08/04/2025    K 3.3 (L) 08/04/2025     08/04/2025    CALCIUM 8.4 (L) 08/04/2025    CO2 27 08/04/2025    BUN 19 08/04/2025    ALT 26 07/01/2025    AST 27 07/01/2025    ALKPHOS 64 07/01/2025    BILITOT 1.1 07/01/2025     Lipids  Lab Results   Component Value Date    CHOL 104 09/09/2024    HDL 45.5 09/09/2024     Urine Albumin Creatinine Ratio  No results found for: \"MICROALBCREA\"  ASCVD risk  The ASCVD Risk score (Celina DK, et al., 2019) failed to calculate for the following reasons:    The valid total cholesterol range is 130 to 320 mg/dL    Additional labs:  No results found for: \"FRUCTOSAMINE\", \"CPEPTIDE\", \"OAF67LQ\", \"NTIB\", \"ZNT8A\", \"INSAB\"    Home glucose monitoring:  Hypoglycemia: denies readings <70 mg/dL or s/sx of hypoglycemia      Assessment/Plan   Type 2 Diabetes Mellitus  Goal A1C <7%, at goal as of 6/2025 however not reflective of recent txp and steroid start. TIR and GMI not at goal with significant hyperglycemia especially after steroid dose in the morning, no lows noted. Pt self-adjusted NPH down d/t readings in the 90s, discussed glycemic goals with pt's son today and importance of DM control to protect newly transplanted kidney. Additionally, pt is using SS AFTER meals and not before. Given yesterdays steroid dose reduction, will be more cautious with dose increases, so will increase " NPH back to previously recommended dose and counseled on proper prandial insulin timing. Future considerations: could screen for GLP1 candidacy to lower insulin requirements in the future, defer SGLT2i until at least 180 days post txp.  Plan:  Increase insulin NPH to 15 units once daily with morning prednisone dose  Continue humalog SS#1 but make sure to take this BEFORE meals  Home glucose monitoring:   Will continue Dexcom G7  A minimum of 72 hours of CGM data was reviewed and used to make therapy changes.   Education Provided to Patient:   Glycemic goals  Rule of 15  Steroids's impact on glucose  Role of insulin in treatment   Hypertension:   Goal BP <130/80    Last 2/3 clinic readings above goal  Denies s/sx of hyper-/hypo-tension   Unable to discuss today  Primary prevention:   Therapy: Moderate intensity statin   LDL unavailable  Renal:  s/p kidney txp (6/10/25)  CKD: stage 3 - GFR 30-59  ACR: Unavailable   Renal protective agents: none  DM medications are dosed appropriately for renal function  Labs: due for lipids, consider A1C at endo visit  PharmD follow-up: 1 month  Endo follow-up: 8/14    Patient agreeable to plan as above, contact information provided for any future questions or concerns.    Cayden Stone, PharmD    Type of encounter: virtual    Continue all meds under the continuation of care with the referring provider and clinical pharmacy team.           [1]   Patient Active Problem List  Diagnosis    Age-related nuclear cataract of both eyes    Anemia    Arteriovenous fistula    Cerebral aneurysm, nonruptured ([facility])    Diabetes mellitus with chronic kidney disease (Multi)    Essential hypertension    Gastroesophageal reflux disease without esophagitis    Insomnia    Left arm swelling    Memory loss    Mixed hyperlipidemia    Polycystic kidney, unspecified    Presbyopia    Right flank pain    Type 2 diabetes mellitus without complication, without long-term current use of insulin    Vision  impairment    PONV (postoperative nausea and vomiting)    Kidney replaced by transplant ([facility])    CLL (chronic lymphocytic leukemia) (Multi)

## 2025-08-07 ENCOUNTER — TELEPHONE (OUTPATIENT)
Dept: HOME HEALTH SERVICES | Facility: HOME HEALTH | Age: 60
End: 2025-08-07

## 2025-08-07 ENCOUNTER — TELEPHONE (OUTPATIENT)
Dept: HEMATOLOGY/ONCOLOGY | Facility: CLINIC | Age: 60
End: 2025-08-07

## 2025-08-07 ENCOUNTER — LAB (OUTPATIENT)
Dept: LAB | Facility: HOSPITAL | Age: 60
End: 2025-08-07
Payer: MEDICAID

## 2025-08-07 ENCOUNTER — DOCUMENTATION (OUTPATIENT)
Dept: HOME HEALTH SERVICES | Facility: HOME HEALTH | Age: 60
End: 2025-08-07

## 2025-08-07 ENCOUNTER — APPOINTMENT (OUTPATIENT)
Dept: PHARMACY | Facility: HOSPITAL | Age: 60
End: 2025-08-07
Payer: MEDICAID

## 2025-08-07 DIAGNOSIS — D64.9 ANEMIA: ICD-10-CM

## 2025-08-07 DIAGNOSIS — D69.6 PLATELETS DECREASED: ICD-10-CM

## 2025-08-07 DIAGNOSIS — D69.6 THROMBOCYTOPENIA: ICD-10-CM

## 2025-08-07 DIAGNOSIS — Z94.0 KIDNEY REPLACED BY TRANSPLANT (HHS-HCC): ICD-10-CM

## 2025-08-07 DIAGNOSIS — D69.3 IDIOPATHIC THROMBOCYTOPENIC PURPURA (MULTI): ICD-10-CM

## 2025-08-07 DIAGNOSIS — Z94.0 KIDNEY TRANSPLANT STATUS: Primary | ICD-10-CM

## 2025-08-07 DIAGNOSIS — E11.9 TYPE 2 DIABETES MELLITUS WITHOUT COMPLICATION, WITHOUT LONG-TERM CURRENT USE OF INSULIN: Primary | Chronic | ICD-10-CM

## 2025-08-07 LAB
ALBUMIN SERPL BCP-MCNC: 3.2 G/DL (ref 3.4–5)
ANION GAP SERPL CALC-SCNC: 13 MMOL/L (ref 10–20)
BUN SERPL-MCNC: 13 MG/DL (ref 6–23)
CALCIUM SERPL-MCNC: 8.5 MG/DL (ref 8.6–10.3)
CHLORIDE SERPL-SCNC: 107 MMOL/L (ref 98–107)
CO2 SERPL-SCNC: 25 MMOL/L (ref 21–32)
CREAT SERPL-MCNC: 0.87 MG/DL (ref 0.5–1.05)
EGFRCR SERPLBLD CKD-EPI 2021: 75 ML/MIN/1.73M*2
ERYTHROCYTE [DISTWIDTH] IN BLOOD BY AUTOMATED COUNT: 15.3 % (ref 11.5–14.5)
GLUCOSE SERPL-MCNC: 178 MG/DL (ref 74–99)
HCT VFR BLD AUTO: 26.4 % (ref 36–46)
HGB BLD-MCNC: 8.6 G/DL (ref 12–16)
MAGNESIUM SERPL-MCNC: 1.53 MG/DL (ref 1.6–2.4)
MCH RBC QN AUTO: 32.8 PG (ref 26–34)
MCHC RBC AUTO-ENTMCNC: 32.6 G/DL (ref 32–36)
MCV RBC AUTO: 101 FL (ref 80–100)
NRBC BLD-RTO: 0 /100 WBCS (ref 0–0)
PHOSPHATE SERPL-MCNC: 1.9 MG/DL (ref 2.5–4.9)
PLATELET # BLD AUTO: 238 X10*3/UL (ref 150–450)
POTASSIUM SERPL-SCNC: 3.8 MMOL/L (ref 3.5–5.3)
RBC # BLD AUTO: 2.62 X10*6/UL (ref 4–5.2)
SODIUM SERPL-SCNC: 141 MMOL/L (ref 136–145)
TACROLIMUS BLD-MCNC: 9.2 NG/ML
WBC # BLD AUTO: 7.8 X10*3/UL (ref 4.4–11.3)

## 2025-08-07 PROCEDURE — 36415 COLL VENOUS BLD VENIPUNCTURE: CPT

## 2025-08-07 PROCEDURE — 80069 RENAL FUNCTION PANEL: CPT

## 2025-08-07 PROCEDURE — 83735 ASSAY OF MAGNESIUM: CPT

## 2025-08-07 PROCEDURE — 87801 DETECT AGNT MULT DNA AMPLI: CPT

## 2025-08-07 PROCEDURE — 85027 COMPLETE CBC AUTOMATED: CPT

## 2025-08-07 PROCEDURE — 80197 ASSAY OF TACROLIMUS: CPT

## 2025-08-07 RX ORDER — ELTROMBOPAG 12.5 MG/1
12.5 TABLET, FILM COATED ORAL
Qty: 30 TABLET | Refills: 11 | Status: CANCELLED | OUTPATIENT
Start: 2025-08-07

## 2025-08-07 NOTE — TELEPHONE ENCOUNTER
Spoke with pt's son, Marlo. Pt needs promacta prescription transferred to [facility] because he picks up all of his mother's medications there and it would be more convenient. Forwarded request to Karina Mendoza.

## 2025-08-07 NOTE — TELEPHONE ENCOUNTER
Reason for Conversation  Med Refill    Background   Promacta - [facility]     Disposition   No disposition on file.

## 2025-08-07 NOTE — HH CARE COORDINATION
Home Care received a Referral for Nursing, Physical Therapy, and Occupational Therapy. We have processed the referral for a Start of Care within 48 hours of 8/9.     If you have any questions or concerns, please feel free to contact us at [phone]. Follow the prompts, enter your five digit zip code, and you will be directed to your care team on [facility].

## 2025-08-07 NOTE — PATIENT INSTRUCTIONS
Increase insulin NPH to 15 units once daily with morning prednisone dose  Continue humalog sliding scale (as below) but make sure to take this BEFORE meals:  If blood sugar is < 150 add 0 units   If blood sugar is 150-200 add 1 units   If blood sugar is 201-250 add 2 units  If blood sugar is 251-300 add 3 units   If blood sugar is 301-350 add 4 units   If blood sugar is 351-400 add 5 units   If blood sugar is 401-450 add 6 units  If blood sugar is over 450, call the office     If you have any questions or concerns, call me at: [phone]

## 2025-08-07 NOTE — TELEPHONE ENCOUNTER
Hi there,  Thank you for the referral for Luis Panda. Please be advised that we do not currently have a HHA in this service area. We  processed the referral for all other disciplines at this time.    Thank you   Intake department

## 2025-08-07 NOTE — TELEPHONE ENCOUNTER
DR Selvin Arthur MD      Home  Care received an email   8/6/25 in regards to home PT  for the patient . Orders in EPIC  dated 8/6/25 are for outpatient PT. Please enter home care orders in EPIC under REF [id number] . Once orders are completed we will review and follow up for any additional required  information.     Thanks     ARIANNE BUCKLEY LPN

## 2025-08-07 NOTE — PROGRESS NOTES
I have reviewed the PharmD's documentation and verified the findings in the note as written with additions or exceptions as stated in the body of this note.    Della Santo PA-C

## 2025-08-08 ENCOUNTER — PHARMACY VISIT (OUTPATIENT)
Dept: PHARMACY | Facility: CLINIC | Age: 60
End: 2025-08-08
Payer: MEDICAID

## 2025-08-08 DIAGNOSIS — Z94.0 KIDNEY REPLACED BY TRANSPLANT (HHS-HCC): ICD-10-CM

## 2025-08-11 ENCOUNTER — APPOINTMENT (OUTPATIENT)
Dept: LAB | Facility: HOSPITAL | Age: 60
End: 2025-08-11
Payer: MEDICAID

## 2025-08-11 ENCOUNTER — HOME CARE VISIT (OUTPATIENT)
Dept: HOME HEALTH SERVICES | Facility: HOME HEALTH | Age: 60
End: 2025-08-11
Payer: MEDICAID

## 2025-08-11 ENCOUNTER — LAB (OUTPATIENT)
Dept: LAB | Facility: HOSPITAL | Age: 60
End: 2025-08-11
Payer: MEDICAID

## 2025-08-11 VITALS
WEIGHT: 147 LBS | OXYGEN SATURATION: 97 % | BODY MASS INDEX: 28.86 KG/M2 | HEIGHT: 60 IN | TEMPERATURE: 97.7 F | HEART RATE: 76 BPM | DIASTOLIC BLOOD PRESSURE: 78 MMHG | SYSTOLIC BLOOD PRESSURE: 126 MMHG

## 2025-08-11 DIAGNOSIS — Z94.0 KIDNEY TRANSPLANT STATUS: Primary | ICD-10-CM

## 2025-08-11 LAB
ALBUMIN SERPL BCP-MCNC: 3.1 G/DL (ref 3.4–5)
ANION GAP SERPL CALC-SCNC: 11 MMOL/L (ref 10–20)
BUN SERPL-MCNC: 8 MG/DL (ref 6–23)
CALCIUM SERPL-MCNC: 8.5 MG/DL (ref 8.6–10.3)
CHLORIDE SERPL-SCNC: 106 MMOL/L (ref 98–107)
CO2 SERPL-SCNC: 24 MMOL/L (ref 21–32)
CREAT SERPL-MCNC: 0.88 MG/DL (ref 0.5–1.05)
EGFRCR SERPLBLD CKD-EPI 2021: 74 ML/MIN/1.73M*2
ERYTHROCYTE [DISTWIDTH] IN BLOOD BY AUTOMATED COUNT: 15.9 % (ref 11.5–14.5)
GLUCOSE SERPL-MCNC: 221 MG/DL (ref 74–99)
HCT VFR BLD AUTO: 24.9 % (ref 36–46)
HGB BLD-MCNC: 8 G/DL (ref 12–16)
MAGNESIUM SERPL-MCNC: 1.4 MG/DL (ref 1.6–2.4)
MCH RBC QN AUTO: 33.1 PG (ref 26–34)
MCHC RBC AUTO-ENTMCNC: 32.1 G/DL (ref 32–36)
MCV RBC AUTO: 103 FL (ref 80–100)
NRBC BLD-RTO: 0 /100 WBCS (ref 0–0)
PHOSPHATE SERPL-MCNC: 2.2 MG/DL (ref 2.5–4.9)
PLATELET # BLD AUTO: 229 X10*3/UL (ref 150–450)
POTASSIUM SERPL-SCNC: 4.2 MMOL/L (ref 3.5–5.3)
RBC # BLD AUTO: 2.42 X10*6/UL (ref 4–5.2)
SODIUM SERPL-SCNC: 137 MMOL/L (ref 136–145)
TACROLIMUS BLD-MCNC: 6.7 NG/ML
WBC # BLD AUTO: 10 X10*3/UL (ref 4.4–11.3)

## 2025-08-11 PROCEDURE — 85027 COMPLETE CBC AUTOMATED: CPT

## 2025-08-11 PROCEDURE — 80069 RENAL FUNCTION PANEL: CPT

## 2025-08-11 PROCEDURE — 80197 ASSAY OF TACROLIMUS: CPT

## 2025-08-11 PROCEDURE — 36415 COLL VENOUS BLD VENIPUNCTURE: CPT

## 2025-08-11 PROCEDURE — 87385 HISTOPLASMA CAPSUL AG IA: CPT

## 2025-08-11 PROCEDURE — 83735 ASSAY OF MAGNESIUM: CPT

## 2025-08-11 PROCEDURE — G0299 HHS/HOSPICE OF RN EA 15 MIN: HCPCS

## 2025-08-11 ASSESSMENT — ENCOUNTER SYMPTOMS
DENIES PAIN: 1
APPETITE LEVEL: GOOD
PAIN LOCATION - PAIN SEVERITY: 2/10
AGGRESSION WITHIN DEFINED LIMITS: 1
ANGER WITHIN DEFINED LIMITS: 1
PAIN LOCATION: ABDOMEN
SLEEP QUALITY: ADEQUATE
OCCASIONAL FEELINGS OF UNSTEADINESS: 0

## 2025-08-11 ASSESSMENT — ACTIVITIES OF DAILY LIVING (ADL)
ENTERING_EXITING_HOME: STAND BY ASSIST
OASIS_M1830: 06

## 2025-08-12 DIAGNOSIS — Z94.0 KIDNEY REPLACED BY TRANSPLANT (HHS-HCC): ICD-10-CM

## 2025-08-12 LAB — HIV1 RNA SERPL DONR QL PROBE AMP: NORMAL

## 2025-08-12 ASSESSMENT — ENCOUNTER SYMPTOMS
PAIN LOCATION - PAIN QUALITY: ACHY
PAIN LOCATION - RELIEVING FACTORS: REST
PAIN LOCATION - EXACERBATING FACTORS: ACTIVITY
SUBJECTIVE PAIN PROGRESSION: GRADUALLY IMPROVING
PAIN LOCATION - PAIN FREQUENCY: INTERMITTENT

## 2025-08-13 ENCOUNTER — HOME CARE VISIT (OUTPATIENT)
Dept: HOME HEALTH SERVICES | Facility: HOME HEALTH | Age: 60
End: 2025-08-13
Payer: MEDICAID

## 2025-08-13 ENCOUNTER — OFFICE VISIT (OUTPATIENT)
Facility: HOSPITAL | Age: 60
End: 2025-08-13
Payer: MEDICAID

## 2025-08-13 VITALS
BODY MASS INDEX: 29.35 KG/M2 | SYSTOLIC BLOOD PRESSURE: 144 MMHG | WEIGHT: 150.3 LBS | DIASTOLIC BLOOD PRESSURE: 80 MMHG | TEMPERATURE: 98.7 F | HEART RATE: 78 BPM | OXYGEN SATURATION: 96 %

## 2025-08-13 DIAGNOSIS — D84.9 IMMUNOSUPPRESSION: Primary | ICD-10-CM

## 2025-08-13 DIAGNOSIS — Z94.0 KIDNEY REPLACED BY TRANSPLANT (HHS-HCC): ICD-10-CM

## 2025-08-13 LAB
ALLOSURE SCORE - KIDNEY: 0.36 %
CAREDX_ORDER_ID: NORMAL
CENTER_ORDER_ID: NORMAL
CLIENT SPECIMEN ID - ALLOSURE: NORMAL
DONOR RELATION - ALLOSURE: NORMAL
NOTES - ALLOSURE: NORMAL
RELATIVE CHANGE VALUE - KIDNEY: 12 %
TEST COMMENTS - ALLOSURE: NORMAL
TIME POST TX - ALLOSURE: NORMAL
TRANSPLANTED ORGAN - ALLOSURE: NORMAL
TX DATE - ALLOSURE/ALLOMAP: NORMAL
WP_ORDER_ID: NORMAL

## 2025-08-13 PROCEDURE — 3077F SYST BP >= 140 MM HG: CPT | Performed by: TRANSPLANT SURGERY

## 2025-08-13 PROCEDURE — 99214 OFFICE O/P EST MOD 30 MIN: CPT | Performed by: TRANSPLANT SURGERY

## 2025-08-13 PROCEDURE — 99214 OFFICE O/P EST MOD 30 MIN: CPT | Mod: 24 | Performed by: TRANSPLANT SURGERY

## 2025-08-13 PROCEDURE — 3079F DIAST BP 80-89 MM HG: CPT | Performed by: TRANSPLANT SURGERY

## 2025-08-13 RX ORDER — TACROLIMUS 0.5 MG/1
1.5 CAPSULE ORAL 2 TIMES DAILY
Qty: 180 CAPSULE | Refills: 11 | Status: SHIPPED | OUTPATIENT
Start: 2025-08-13 | End: 2026-08-13

## 2025-08-13 ASSESSMENT — ENCOUNTER SYMPTOMS
ADENOPATHY: 0
ABDOMINAL DISTENTION: 0
DIARRHEA: 0
DYSURIA: 0
COUGH: 0
WEAKNESS: 0
LIGHT-HEADEDNESS: 0
CONSTIPATION: 0
ARTHRALGIAS: 0
SHORTNESS OF BREATH: 0
HALLUCINATIONS: 0
CONFUSION: 0
AGITATION: 0
COLOR CHANGE: 0
DIZZINESS: 0
FREQUENCY: 0
FEVER: 0
EYES NEGATIVE: 1
CHILLS: 0
ABDOMINAL PAIN: 0
HEMATURIA: 0

## 2025-08-14 ENCOUNTER — OFFICE VISIT (OUTPATIENT)
Facility: HOSPITAL | Age: 60
End: 2025-08-14
Payer: MEDICAID

## 2025-08-14 ENCOUNTER — HOME CARE VISIT (OUTPATIENT)
Dept: HOME HEALTH SERVICES | Facility: HOME HEALTH | Age: 60
End: 2025-08-14
Payer: MEDICAID

## 2025-08-14 VITALS
HEART RATE: 62 BPM | SYSTOLIC BLOOD PRESSURE: 142 MMHG | DIASTOLIC BLOOD PRESSURE: 71 MMHG | TEMPERATURE: 97.5 F | OXYGEN SATURATION: 99 %

## 2025-08-14 DIAGNOSIS — E11.9 TYPE 2 DIABETES MELLITUS WITHOUT COMPLICATION, WITHOUT LONG-TERM CURRENT USE OF INSULIN: Primary | Chronic | ICD-10-CM

## 2025-08-14 DIAGNOSIS — I10 ESSENTIAL HYPERTENSION: ICD-10-CM

## 2025-08-14 DIAGNOSIS — T38.0X5A STEROID-INDUCED HYPERGLYCEMIA: ICD-10-CM

## 2025-08-14 DIAGNOSIS — R73.9 STEROID-INDUCED HYPERGLYCEMIA: ICD-10-CM

## 2025-08-14 DIAGNOSIS — D64.9 ANEMIA, UNSPECIFIED TYPE: Primary | ICD-10-CM

## 2025-08-14 DIAGNOSIS — Z94.0 KIDNEY REPLACED BY TRANSPLANT (HHS-HCC): ICD-10-CM

## 2025-08-14 PROCEDURE — 99214 OFFICE O/P EST MOD 30 MIN: CPT | Performed by: PHYSICIAN ASSISTANT

## 2025-08-14 PROCEDURE — 99214 OFFICE O/P EST MOD 30 MIN: CPT | Mod: GC,GT,U1 | Performed by: PHYSICIAN ASSISTANT

## 2025-08-14 PROCEDURE — 95251 CONT GLUC MNTR ANALYSIS I&R: CPT | Performed by: PHYSICIAN ASSISTANT

## 2025-08-14 PROCEDURE — RXMED WILLOW AMBULATORY MEDICATION CHARGE

## 2025-08-14 PROCEDURE — G0151 HHCP-SERV OF PT,EA 15 MIN: HCPCS

## 2025-08-14 RX ORDER — BLOOD-GLUCOSE SENSOR
EACH MISCELLANEOUS
Qty: 3 EACH | Refills: 11 | Status: SHIPPED | OUTPATIENT
Start: 2025-08-14

## 2025-08-14 RX ORDER — CARVEDILOL 25 MG/1
25 TABLET ORAL
Qty: 180 TABLET | Refills: 3 | Status: SHIPPED | OUTPATIENT
Start: 2025-08-14 | End: 2026-08-14

## 2025-08-14 ASSESSMENT — ENCOUNTER SYMPTOMS
OCCASIONAL FEELINGS OF UNSTEADINESS: 1
PAIN LOCATION - PAIN SEVERITY: 0/10
PAIN LOCATION: ABDOMEN
HIGHEST PAIN SEVERITY IN PAST 24 HOURS: 5/10
LOWEST PAIN SEVERITY IN PAST 24 HOURS: 0/10
PERSON REPORTING PAIN: PATIENT
PAIN: 1
SUBJECTIVE PAIN PROGRESSION: GRADUALLY IMPROVING
PAIN SEVERITY GOAL: 0/10

## 2025-08-15 DIAGNOSIS — Z94.0 KIDNEY REPLACED BY TRANSPLANT (HHS-HCC): ICD-10-CM

## 2025-08-15 LAB
H CAPSUL AG UR QL: NOT DETECTED
SCAN RESULT: NORMAL

## 2025-08-15 PROCEDURE — RXMED WILLOW AMBULATORY MEDICATION CHARGE

## 2025-08-16 ENCOUNTER — PHARMACY VISIT (OUTPATIENT)
Dept: PHARMACY | Facility: CLINIC | Age: 60
End: 2025-08-16
Payer: COMMERCIAL

## 2025-08-18 ENCOUNTER — OFFICE VISIT (OUTPATIENT)
Dept: HEMATOLOGY/ONCOLOGY | Facility: CLINIC | Age: 60
End: 2025-08-18
Payer: MEDICAID

## 2025-08-18 VITALS
OXYGEN SATURATION: 99 % | BODY MASS INDEX: 28.93 KG/M2 | SYSTOLIC BLOOD PRESSURE: 136 MMHG | RESPIRATION RATE: 16 BRPM | HEART RATE: 77 BPM | WEIGHT: 148.15 LBS | TEMPERATURE: 96.8 F | DIASTOLIC BLOOD PRESSURE: 83 MMHG

## 2025-08-18 DIAGNOSIS — D64.9 ANEMIA, UNSPECIFIED TYPE: ICD-10-CM

## 2025-08-18 DIAGNOSIS — D63.1 ANEMIA DUE TO CHRONIC KIDNEY DISEASE, UNSPECIFIED CKD STAGE: Primary | ICD-10-CM

## 2025-08-18 DIAGNOSIS — N18.9 ANEMIA DUE TO CHRONIC KIDNEY DISEASE, UNSPECIFIED CKD STAGE: Primary | ICD-10-CM

## 2025-08-18 LAB
BASOPHILS # BLD AUTO: 0.02 X10*3/UL (ref 0–0.1)
BASOPHILS NFR BLD AUTO: 0.3 %
EOSINOPHIL # BLD AUTO: 0.05 X10*3/UL (ref 0–0.7)
EOSINOPHIL NFR BLD AUTO: 0.6 %
ERYTHROCYTE [DISTWIDTH] IN BLOOD BY AUTOMATED COUNT: 15.4 % (ref 11.5–14.5)
HCT VFR BLD AUTO: 24 % (ref 36–46)
HGB BLD-MCNC: 7.5 G/DL (ref 12–16)
IMM GRANULOCYTES # BLD AUTO: 0.02 X10*3/UL (ref 0–0.7)
IMM GRANULOCYTES NFR BLD AUTO: 0.3 % (ref 0–0.9)
LYMPHOCYTES # BLD AUTO: 0.45 X10*3/UL (ref 1.2–4.8)
LYMPHOCYTES NFR BLD AUTO: 5.8 %
MCH RBC QN AUTO: 32.5 PG (ref 26–34)
MCHC RBC AUTO-ENTMCNC: 31.3 G/DL (ref 32–36)
MCV RBC AUTO: 104 FL (ref 80–100)
MONOCYTES # BLD AUTO: 0.59 X10*3/UL (ref 0.1–1)
MONOCYTES NFR BLD AUTO: 7.6 %
NEUTROPHILS # BLD AUTO: 6.67 X10*3/UL (ref 1.2–7.7)
NEUTROPHILS NFR BLD AUTO: 85.4 %
NRBC BLD-RTO: ABNORMAL /100{WBCS}
PLATELET # BLD AUTO: 212 X10*3/UL (ref 150–450)
RBC # BLD AUTO: 2.31 X10*6/UL (ref 4–5.2)
WBC # BLD AUTO: 7.8 X10*3/UL (ref 4.4–11.3)

## 2025-08-18 PROCEDURE — 36415 COLL VENOUS BLD VENIPUNCTURE: CPT | Performed by: PHYSICIAN ASSISTANT

## 2025-08-18 PROCEDURE — 3075F SYST BP GE 130 - 139MM HG: CPT | Performed by: PHYSICIAN ASSISTANT

## 2025-08-18 PROCEDURE — 85025 COMPLETE CBC W/AUTO DIFF WBC: CPT | Performed by: PHYSICIAN ASSISTANT

## 2025-08-18 PROCEDURE — 3079F DIAST BP 80-89 MM HG: CPT | Performed by: PHYSICIAN ASSISTANT

## 2025-08-18 PROCEDURE — 99214 OFFICE O/P EST MOD 30 MIN: CPT | Performed by: PHYSICIAN ASSISTANT

## 2025-08-18 ASSESSMENT — PAIN SCALES - GENERAL: PAINLEVEL_OUTOF10: 6

## 2025-08-19 DIAGNOSIS — Z94.0 KIDNEY REPLACED BY TRANSPLANT (HHS-HCC): ICD-10-CM

## 2025-08-20 ENCOUNTER — TELEPHONE (OUTPATIENT)
Dept: HEMATOLOGY/ONCOLOGY | Facility: CLINIC | Age: 60
End: 2025-08-20

## 2025-08-20 ENCOUNTER — LAB (OUTPATIENT)
Dept: LAB | Facility: HOSPITAL | Age: 60
End: 2025-08-20
Payer: MEDICAID

## 2025-08-20 DIAGNOSIS — Z94.0 KIDNEY TRANSPLANT STATUS: Primary | ICD-10-CM

## 2025-08-20 LAB
ALBUMIN SERPL BCP-MCNC: 3.5 G/DL (ref 3.4–5)
ANION GAP SERPL CALC-SCNC: 11 MMOL/L (ref 10–20)
BUN SERPL-MCNC: 11 MG/DL (ref 6–23)
CALCIUM SERPL-MCNC: 8.7 MG/DL (ref 8.6–10.3)
CHLORIDE SERPL-SCNC: 106 MMOL/L (ref 98–107)
CO2 SERPL-SCNC: 25 MMOL/L (ref 21–32)
CREAT SERPL-MCNC: 0.93 MG/DL (ref 0.5–1.05)
CREAT UR-MCNC: 38.9 MG/DL (ref 20–320)
EGFRCR SERPLBLD CKD-EPI 2021: 70 ML/MIN/1.73M*2
ERYTHROCYTE [DISTWIDTH] IN BLOOD BY AUTOMATED COUNT: 15.3 % (ref 11.5–14.5)
GLUCOSE SERPL-MCNC: 235 MG/DL (ref 74–99)
HCT VFR BLD AUTO: 25.9 % (ref 36–46)
HGB BLD-MCNC: 8 G/DL (ref 12–16)
MAGNESIUM SERPL-MCNC: 1.25 MG/DL (ref 1.6–2.4)
MCH RBC QN AUTO: 31.7 PG (ref 26–34)
MCHC RBC AUTO-ENTMCNC: 30.9 G/DL (ref 32–36)
MCV RBC AUTO: 103 FL (ref 80–100)
NRBC BLD-RTO: 0 /100 WBCS (ref 0–0)
PHOSPHATE SERPL-MCNC: 2.6 MG/DL (ref 2.5–4.9)
PLATELET # BLD AUTO: 191 X10*3/UL (ref 150–450)
POTASSIUM SERPL-SCNC: 4.1 MMOL/L (ref 3.5–5.3)
PROT UR-ACNC: 9 MG/DL (ref 5–24)
PROT/CREAT UR: 0.23 MG/MG CREAT (ref 0–0.17)
RBC # BLD AUTO: 2.52 X10*6/UL (ref 4–5.2)
SODIUM SERPL-SCNC: 138 MMOL/L (ref 136–145)
TACROLIMUS BLD-MCNC: 10.4 NG/ML
WBC # BLD AUTO: 6.8 X10*3/UL (ref 4.4–11.3)

## 2025-08-20 PROCEDURE — 82570 ASSAY OF URINE CREATININE: CPT

## 2025-08-20 PROCEDURE — 85027 COMPLETE CBC AUTOMATED: CPT

## 2025-08-20 PROCEDURE — 87799 DETECT AGENT NOS DNA QUANT: CPT

## 2025-08-20 PROCEDURE — 83550 IRON BINDING TEST: CPT

## 2025-08-20 PROCEDURE — 84156 ASSAY OF PROTEIN URINE: CPT

## 2025-08-20 PROCEDURE — 36415 COLL VENOUS BLD VENIPUNCTURE: CPT

## 2025-08-20 PROCEDURE — 80069 RENAL FUNCTION PANEL: CPT

## 2025-08-20 PROCEDURE — 83735 ASSAY OF MAGNESIUM: CPT

## 2025-08-20 PROCEDURE — 83540 ASSAY OF IRON: CPT

## 2025-08-20 PROCEDURE — 87385 HISTOPLASMA CAPSUL AG IA: CPT

## 2025-08-20 PROCEDURE — 82728 ASSAY OF FERRITIN: CPT

## 2025-08-20 PROCEDURE — 80197 ASSAY OF TACROLIMUS: CPT

## 2025-08-21 ENCOUNTER — HOME CARE VISIT (OUTPATIENT)
Dept: HOME HEALTH SERVICES | Facility: HOME HEALTH | Age: 60
End: 2025-08-21
Payer: MEDICAID

## 2025-08-21 VITALS
TEMPERATURE: 97.2 F | HEART RATE: 64 BPM | SYSTOLIC BLOOD PRESSURE: 138 MMHG | OXYGEN SATURATION: 98 % | DIASTOLIC BLOOD PRESSURE: 62 MMHG

## 2025-08-21 DIAGNOSIS — D64.9 ANEMIA, UNSPECIFIED TYPE: ICD-10-CM

## 2025-08-21 LAB
BKV DNA SERPL NAA+PROBE-LOG#: NORMAL {LOG_COPIES}/ML
EBV DNA SPEC NAA+PROBE-LOG#: NORMAL {LOG_COPIES}/ML
FERRITIN SERPL-MCNC: 638 NG/ML (ref 8–150)
IRON SATN MFR SERPL: 16 % (ref 25–45)
IRON SERPL-MCNC: 38 UG/DL (ref 35–150)
LABORATORY COMMENT REPORT: NOT DETECTED
LABORATORY COMMENT REPORT: NOT DETECTED
TIBC SERPL-MCNC: 244 UG/DL (ref 240–445)
UIBC SERPL-MCNC: 206 UG/DL (ref 110–370)

## 2025-08-21 PROCEDURE — G0151 HHCP-SERV OF PT,EA 15 MIN: HCPCS

## 2025-08-21 PROCEDURE — RXMED WILLOW AMBULATORY MEDICATION CHARGE

## 2025-08-21 RX ORDER — EPINEPHRINE 0.3 MG/.3ML
0.3 INJECTION SUBCUTANEOUS EVERY 5 MIN PRN
OUTPATIENT
Start: 2025-08-28

## 2025-08-21 RX ORDER — FAMOTIDINE 10 MG/ML
20 INJECTION, SOLUTION INTRAVENOUS ONCE AS NEEDED
OUTPATIENT
Start: 2025-08-28

## 2025-08-21 RX ORDER — HEPARIN 100 UNIT/ML
500 SYRINGE INTRAVENOUS AS NEEDED
OUTPATIENT
Start: 2025-08-21

## 2025-08-21 RX ORDER — ALBUTEROL SULFATE 0.83 MG/ML
3 SOLUTION RESPIRATORY (INHALATION) AS NEEDED
OUTPATIENT
Start: 2025-08-28

## 2025-08-21 RX ORDER — DIPHENHYDRAMINE HYDROCHLORIDE 50 MG/ML
50 INJECTION, SOLUTION INTRAMUSCULAR; INTRAVENOUS AS NEEDED
OUTPATIENT
Start: 2025-08-28

## 2025-08-21 RX ORDER — HEPARIN SODIUM,PORCINE/PF 10 UNIT/ML
50 SYRINGE (ML) INTRAVENOUS AS NEEDED
OUTPATIENT
Start: 2025-08-21

## 2025-08-21 SDOH — HEALTH STABILITY: PHYSICAL HEALTH: PHYSICAL EXERCISE: SEATED

## 2025-08-21 SDOH — HEALTH STABILITY: PHYSICAL HEALTH: RESISTANCE: 2#

## 2025-08-21 SDOH — HEALTH STABILITY: PHYSICAL HEALTH: PHYSICAL EXERCISE: 10

## 2025-08-21 SDOH — HEALTH STABILITY: PHYSICAL HEALTH: EXERCISE ACTIVITIES SETS: 3

## 2025-08-21 SDOH — HEALTH STABILITY: PHYSICAL HEALTH: EXERCISE ACTIVITY: HALF STAR DRILL WITH CANE

## 2025-08-21 SDOH — HEALTH STABILITY: PHYSICAL HEALTH: EXERCISE ACTIVITIES SETS: 1

## 2025-08-21 SDOH — HEALTH STABILITY: PHYSICAL HEALTH: PHYSICAL EXERCISE: STANDING

## 2025-08-21 SDOH — HEALTH STABILITY: PHYSICAL HEALTH: RESISTANCE: N/A-2"STEP

## 2025-08-21 SDOH — HEALTH STABILITY: PHYSICAL HEALTH

## 2025-08-21 SDOH — HEALTH STABILITY: PHYSICAL HEALTH: EXERCISE ACTIVITY: LAQ

## 2025-08-21 SDOH — HEALTH STABILITY: PHYSICAL HEALTH: EXERCISE ACTIVITY: WEIGHT SHIFT TOE TAP W CANE

## 2025-08-21 SDOH — HEALTH STABILITY: PHYSICAL HEALTH: PHYSICAL EXERCISE: 5

## 2025-08-21 ASSESSMENT — ENCOUNTER SYMPTOMS
LOWEST PAIN SEVERITY IN PAST 24 HOURS: 1/10
PERSON REPORTING PAIN: PATIENT
PAIN LOCATION: ABDOMEN
HIGHEST PAIN SEVERITY IN PAST 24 HOURS: 5/10
PAIN: 1
PAIN LOCATION - PAIN SEVERITY: 5/10
PAIN SEVERITY GOAL: 0/10

## 2025-08-22 ENCOUNTER — PHARMACY VISIT (OUTPATIENT)
Dept: PHARMACY | Facility: CLINIC | Age: 60
End: 2025-08-22
Payer: MEDICAID

## 2025-08-22 ENCOUNTER — HOME CARE VISIT (OUTPATIENT)
Dept: HOME HEALTH SERVICES | Facility: HOME HEALTH | Age: 60
End: 2025-08-22
Payer: MEDICAID

## 2025-08-22 ENCOUNTER — PATIENT MESSAGE (OUTPATIENT)
Dept: TRANSPLANT | Facility: HOSPITAL | Age: 60
End: 2025-08-22

## 2025-08-22 ENCOUNTER — OFFICE VISIT (OUTPATIENT)
Facility: HOSPITAL | Age: 60
End: 2025-08-22
Payer: MEDICAID

## 2025-08-22 ENCOUNTER — TELEPHONE (OUTPATIENT)
Facility: HOSPITAL | Age: 60
End: 2025-08-22

## 2025-08-22 VITALS
SYSTOLIC BLOOD PRESSURE: 130 MMHG | TEMPERATURE: 99.1 F | OXYGEN SATURATION: 99 % | HEART RATE: 80 BPM | DIASTOLIC BLOOD PRESSURE: 70 MMHG | RESPIRATION RATE: 16 BRPM

## 2025-08-22 VITALS
SYSTOLIC BLOOD PRESSURE: 134 MMHG | WEIGHT: 146 LBS | BODY MASS INDEX: 28.51 KG/M2 | DIASTOLIC BLOOD PRESSURE: 79 MMHG | HEART RATE: 79 BPM | OXYGEN SATURATION: 99 %

## 2025-08-22 DIAGNOSIS — Z94.0 KIDNEY REPLACED BY TRANSPLANT (HHS-HCC): ICD-10-CM

## 2025-08-22 DIAGNOSIS — R05.9 COUGH IN ADULT PATIENT: ICD-10-CM

## 2025-08-22 DIAGNOSIS — Z79.899 IMMUNOSUPPRESSIVE MANAGEMENT ENCOUNTER FOLLOWING KIDNEY TRANSPLANT: Primary | ICD-10-CM

## 2025-08-22 DIAGNOSIS — Z92.25 PERSONAL HISTORY OF IMMUNOSUPRESSION THERAPY: ICD-10-CM

## 2025-08-22 DIAGNOSIS — E83.39 HYPOPHOSPHATEMIA: ICD-10-CM

## 2025-08-22 DIAGNOSIS — E83.42 HYPOMAGNESEMIA: ICD-10-CM

## 2025-08-22 DIAGNOSIS — Z94.0 IMMUNOSUPPRESSIVE MANAGEMENT ENCOUNTER FOLLOWING KIDNEY TRANSPLANT: Primary | ICD-10-CM

## 2025-08-22 PROCEDURE — G0300 HHS/HOSPICE OF LPN EA 15 MIN: HCPCS

## 2025-08-22 PROCEDURE — RXMED WILLOW AMBULATORY MEDICATION CHARGE

## 2025-08-22 PROCEDURE — 99213 OFFICE O/P EST LOW 20 MIN: CPT | Mod: 24

## 2025-08-22 RX ORDER — MAGNESIUM GLUCONATE 27 MG(500)
27 TABLET ORAL
Qty: 60 TABLET | Refills: 11 | Status: SHIPPED | OUTPATIENT
Start: 2025-08-22 | End: 2026-08-22

## 2025-08-22 RX ORDER — MYCOPHENOLATE MOFETIL 250 MG/1
500 CAPSULE ORAL 2 TIMES DAILY
Qty: 120 CAPSULE | Refills: 11 | Status: SHIPPED | OUTPATIENT
Start: 2025-08-22 | End: 2026-08-22

## 2025-08-22 RX ORDER — TACROLIMUS 0.5 MG/1
CAPSULE ORAL
Qty: 150 CAPSULE | Refills: 11 | Status: CANCELLED | OUTPATIENT
Start: 2025-08-22 | End: 2026-08-22

## 2025-08-22 ASSESSMENT — ENCOUNTER SYMPTOMS
AGITATION: 0
ABDOMINAL DISTENTION: 0
CONFUSION: 0
FEVER: 0
FREQUENCY: 0
COUGH: 1
DIARRHEA: 0
WEAKNESS: 0
PAIN LOCATION - PAIN SEVERITY: 5/10
LOWEST PAIN SEVERITY IN PAST 24 HOURS: 1/10
HEMATURIA: 0
HIGHEST PAIN SEVERITY IN PAST 24 HOURS: 5/10
ARTHRALGIAS: 0
HALLUCINATIONS: 0
CONSTIPATION: 0
LIGHT-HEADEDNESS: 0
PAIN LOCATION: ABDOMEN
PERSON REPORTING PAIN: PATIENT
PAIN: 1
EYES NEGATIVE: 1
COLOR CHANGE: 0
PAIN SEVERITY GOAL: 0/10
CHILLS: 0
DIZZINESS: 0
ABDOMINAL PAIN: 1
APPETITE LEVEL: FAIR
DYSURIA: 0
ADENOPATHY: 0

## 2025-08-22 ASSESSMENT — PAIN SCALES - GENERAL: PAINLEVEL_OUTOF10: 4

## 2025-08-23 LAB
H CAPSUL AG UR QL: NOT DETECTED
SCAN RESULT: NORMAL

## 2025-08-25 ENCOUNTER — HOME CARE VISIT (OUTPATIENT)
Dept: HOME HEALTH SERVICES | Facility: HOME HEALTH | Age: 60
End: 2025-08-25
Payer: MEDICAID

## 2025-08-25 ENCOUNTER — HOSPITAL ENCOUNTER (EMERGENCY)
Facility: HOSPITAL | Age: 60
Discharge: HOME | End: 2025-08-25
Payer: MEDICAID

## 2025-08-25 VITALS
SYSTOLIC BLOOD PRESSURE: 131 MMHG | WEIGHT: 146 LBS | HEART RATE: 78 BPM | BODY MASS INDEX: 28.51 KG/M2 | OXYGEN SATURATION: 97 % | TEMPERATURE: 97 F | DIASTOLIC BLOOD PRESSURE: 67 MMHG | RESPIRATION RATE: 16 BRPM

## 2025-08-25 LAB
ALBUMIN SERPL BCP-MCNC: 3.5 G/DL (ref 3.4–5)
ANION GAP SERPL CALC-SCNC: 10 MMOL/L (ref 10–20)
BUN SERPL-MCNC: 11 MG/DL (ref 6–23)
CALCIUM SERPL-MCNC: 8.4 MG/DL (ref 8.6–10.3)
CHLORIDE SERPL-SCNC: 108 MMOL/L (ref 98–107)
CO2 SERPL-SCNC: 25 MMOL/L (ref 21–32)
CREAT SERPL-MCNC: 0.93 MG/DL (ref 0.5–1.05)
EGFRCR SERPLBLD CKD-EPI 2021: 70 ML/MIN/1.73M*2
ERYTHROCYTE [DISTWIDTH] IN BLOOD BY AUTOMATED COUNT: 15.3 % (ref 11.5–14.5)
GLUCOSE SERPL-MCNC: 132 MG/DL (ref 74–99)
HCT VFR BLD AUTO: 23.8 % (ref 36–46)
HGB BLD-MCNC: 7.4 G/DL (ref 12–16)
MAGNESIUM SERPL-MCNC: 1.16 MG/DL (ref 1.6–2.4)
MCH RBC QN AUTO: 31.6 PG (ref 26–34)
MCHC RBC AUTO-ENTMCNC: 31.1 G/DL (ref 32–36)
MCV RBC AUTO: 102 FL (ref 80–100)
NRBC BLD-RTO: 0 /100 WBCS (ref 0–0)
PHOSPHATE SERPL-MCNC: 2.3 MG/DL (ref 2.5–4.9)
PLATELET # BLD AUTO: 129 X10*3/UL (ref 150–450)
POTASSIUM SERPL-SCNC: 4.1 MMOL/L (ref 3.5–5.3)
RBC # BLD AUTO: 2.34 X10*6/UL (ref 4–5.2)
SODIUM SERPL-SCNC: 139 MMOL/L (ref 136–145)
WBC # BLD AUTO: 7.9 X10*3/UL (ref 4.4–11.3)

## 2025-08-25 PROCEDURE — G0299 HHS/HOSPICE OF RN EA 15 MIN: HCPCS

## 2025-08-25 PROCEDURE — 83735 ASSAY OF MAGNESIUM: CPT

## 2025-08-25 PROCEDURE — 80069 RENAL FUNCTION PANEL: CPT

## 2025-08-25 PROCEDURE — 80197 ASSAY OF TACROLIMUS: CPT

## 2025-08-25 PROCEDURE — 80053 COMPREHEN METABOLIC PANEL: CPT

## 2025-08-25 PROCEDURE — 85027 COMPLETE CBC AUTOMATED: CPT

## 2025-08-25 ASSESSMENT — ENCOUNTER SYMPTOMS
APPETITE LEVEL: GOOD
DENIES PAIN: 1

## 2025-08-25 ASSESSMENT — LIFESTYLE VARIABLES
HAVE PEOPLE ANNOYED YOU BY CRITICIZING YOUR DRINKING: NO
TOTAL SCORE: 0
HAVE YOU EVER FELT YOU SHOULD CUT DOWN ON YOUR DRINKING: NO
EVER FELT BAD OR GUILTY ABOUT YOUR DRINKING: NO
EVER HAD A DRINK FIRST THING IN THE MORNING TO STEADY YOUR NERVES TO GET RID OF A HANGOVER: NO

## 2025-08-25 ASSESSMENT — PAIN SCALES - GENERAL: PAINLEVEL_OUTOF10: 0 - NO PAIN

## 2025-08-25 ASSESSMENT — PAIN - FUNCTIONAL ASSESSMENT: PAIN_FUNCTIONAL_ASSESSMENT: 0-10

## 2025-08-26 ENCOUNTER — TELEPHONE (OUTPATIENT)
Dept: HEMATOLOGY/ONCOLOGY | Facility: CLINIC | Age: 60
End: 2025-08-26
Payer: MEDICAID

## 2025-08-26 DIAGNOSIS — Z94.0 KIDNEY REPLACED BY TRANSPLANT (HHS-HCC): ICD-10-CM

## 2025-08-26 LAB — TACROLIMUS BLD-MCNC: 8.4 NG/ML

## 2025-08-27 ENCOUNTER — HOME CARE VISIT (OUTPATIENT)
Dept: HOME HEALTH SERVICES | Facility: HOME HEALTH | Age: 60
End: 2025-08-27
Payer: MEDICAID

## 2025-08-27 VITALS
OXYGEN SATURATION: 98 % | HEART RATE: 68 BPM | TEMPERATURE: 96.7 F | SYSTOLIC BLOOD PRESSURE: 103 MMHG | DIASTOLIC BLOOD PRESSURE: 59 MMHG

## 2025-08-27 PROCEDURE — G0151 HHCP-SERV OF PT,EA 15 MIN: HCPCS

## 2025-08-27 SDOH — HEALTH STABILITY: PHYSICAL HEALTH: PHYSICAL EXERCISE: 10

## 2025-08-27 SDOH — HEALTH STABILITY: PHYSICAL HEALTH: RESISTANCE: N/A-2"STEP

## 2025-08-27 SDOH — HEALTH STABILITY: PHYSICAL HEALTH: PHYSICAL EXERCISE: 20

## 2025-08-27 SDOH — HEALTH STABILITY: PHYSICAL HEALTH: PHYSICAL EXERCISE: STANDING

## 2025-08-27 SDOH — HEALTH STABILITY: PHYSICAL HEALTH: EXERCISE ACTIVITY: WEIGHT SHIFT TOE TAPS

## 2025-08-27 SDOH — HEALTH STABILITY: PHYSICAL HEALTH: EXERCISE ACTIVITIES SETS: 2

## 2025-08-27 SDOH — HEALTH STABILITY: PHYSICAL HEALTH: EXERCISE ACTIVITIES SETS: 3

## 2025-08-27 SDOH — HEALTH STABILITY: PHYSICAL HEALTH: EXERCISE ACTIVITY: CLOCKWISE SQUARE STEP

## 2025-08-27 SDOH — HEALTH STABILITY: PHYSICAL HEALTH

## 2025-08-27 SDOH — HEALTH STABILITY: PHYSICAL HEALTH: EXERCISE ACTIVITY: 1/2 STAR DRILL W CANE

## 2025-08-27 SDOH — HEALTH STABILITY: PHYSICAL HEALTH: RESISTANCE: 2#

## 2025-08-27 SDOH — HEALTH STABILITY: PHYSICAL HEALTH: PHYSICAL EXERCISE: 5

## 2025-08-27 SDOH — HEALTH STABILITY: PHYSICAL HEALTH: PHYSICAL EXERCISE: SEATED

## 2025-08-27 SDOH — HEALTH STABILITY: PHYSICAL HEALTH: EXERCISE ACTIVITY: LAQ

## 2025-08-27 SDOH — HEALTH STABILITY: PHYSICAL HEALTH: EXERCISE ACTIVITIES SETS: 1

## 2025-08-27 ASSESSMENT — ENCOUNTER SYMPTOMS
PAIN LOCATION - PAIN SEVERITY: 2/10
HIGHEST PAIN SEVERITY IN PAST 24 HOURS: 3/10
PAIN LOCATION: ABDOMEN
PAIN: 1
PERSON REPORTING PAIN: PATIENT
LOWEST PAIN SEVERITY IN PAST 24 HOURS: 0/10
SUBJECTIVE PAIN PROGRESSION: GRADUALLY IMPROVING
PAIN SEVERITY GOAL: 0/10

## 2025-08-29 ENCOUNTER — LAB (OUTPATIENT)
Dept: LAB | Facility: HOSPITAL | Age: 60
End: 2025-08-29
Payer: MEDICAID

## 2025-08-29 ENCOUNTER — APPOINTMENT (OUTPATIENT)
Dept: LAB | Facility: HOSPITAL | Age: 60
End: 2025-08-29
Payer: MEDICAID

## 2025-08-29 ENCOUNTER — PATIENT MESSAGE (OUTPATIENT)
Facility: HOSPITAL | Age: 60
End: 2025-08-29

## 2025-08-29 DIAGNOSIS — E83.42 HYPOMAGNESEMIA: ICD-10-CM

## 2025-08-29 DIAGNOSIS — Z94.0 KIDNEY REPLACED BY TRANSPLANT (HHS-HCC): ICD-10-CM

## 2025-08-29 DIAGNOSIS — D64.9 ANEMIA, UNSPECIFIED TYPE: Primary | ICD-10-CM

## 2025-08-29 LAB
ALBUMIN SERPL BCP-MCNC: 3.5 G/DL (ref 3.4–5)
ANION GAP SERPL CALC-SCNC: 12 MMOL/L (ref 10–20)
BUN SERPL-MCNC: 11 MG/DL (ref 6–23)
CALCIUM SERPL-MCNC: 8.5 MG/DL (ref 8.6–10.3)
CHLORIDE SERPL-SCNC: 109 MMOL/L (ref 98–107)
CO2 SERPL-SCNC: 23 MMOL/L (ref 21–32)
CREAT SERPL-MCNC: 0.9 MG/DL (ref 0.5–1.05)
DAT-POLYSPECIFIC: NORMAL
EBV VCA IGG SER IA-ACNC: NEGATIVE
EBV VCA IGM SER IA-ACNC: NEGATIVE
EGFRCR SERPLBLD CKD-EPI 2021: 72 ML/MIN/1.73M*2
ERYTHROCYTE [DISTWIDTH] IN BLOOD BY AUTOMATED COUNT: 14.8 % (ref 11.5–14.5)
FERRITIN SERPL-MCNC: 650 NG/ML (ref 8–150)
FOLATE SERPL-MCNC: 2.1 NG/ML
GLUCOSE SERPL-MCNC: 143 MG/DL (ref 74–99)
HAPTOGLOB SERPL NEPH-MCNC: 132 MG/DL (ref 30–200)
HBV CORE AB SER QL: NONREACTIVE
HBV SURFACE AB SER-ACNC: 7.1 MIU/ML
HBV SURFACE AG SERPL QL IA: NONREACTIVE
HCT VFR BLD AUTO: 24.6 % (ref 36–46)
HGB BLD-MCNC: 7.7 G/DL (ref 12–16)
HGB RETIC QN: 23 PG (ref 28–38)
IGA SERPL-MCNC: 187 MG/DL (ref 70–400)
IGG SERPL-MCNC: 896 MG/DL (ref 700–1600)
IGM SERPL-MCNC: 25 MG/DL (ref 40–230)
IMMATURE RETIC FRACTION: 21 %
IRON SATN MFR SERPL: 13 % (ref 25–45)
IRON SERPL-MCNC: 29 UG/DL (ref 35–150)
LDH SERPL L TO P-CCNC: 200 U/L (ref 84–246)
MAGNESIUM SERPL-MCNC: 1.2 MG/DL (ref 1.6–2.4)
MCH RBC QN AUTO: 31.4 PG (ref 26–34)
MCHC RBC AUTO-ENTMCNC: 31.3 G/DL (ref 32–36)
MCV RBC AUTO: 100 FL (ref 80–100)
NRBC BLD-RTO: 0 /100 WBCS (ref 0–0)
PHOSPHATE SERPL-MCNC: 2 MG/DL (ref 2.5–4.9)
PLATELET # BLD AUTO: 128 X10*3/UL (ref 150–450)
POTASSIUM SERPL-SCNC: 3.5 MMOL/L (ref 3.5–5.3)
PROT SERPL-MCNC: 6.3 G/DL (ref 6.4–8.2)
RBC # BLD AUTO: 2.45 X10*6/UL (ref 4–5.2)
RETICS #: 0.07 X10*6/UL (ref 0.02–0.08)
RETICS/RBC NFR AUTO: 2.8 % (ref 0.5–2)
SODIUM SERPL-SCNC: 140 MMOL/L (ref 136–145)
TACROLIMUS BLD-MCNC: 8.9 NG/ML
TIBC SERPL-MCNC: 218 UG/DL (ref 240–445)
UIBC SERPL-MCNC: 189 UG/DL (ref 110–370)
VIT B12 SERPL-MCNC: 411 PG/ML (ref 211–911)
WBC # BLD AUTO: 6.4 X10*3/UL (ref 4.4–11.3)

## 2025-08-29 PROCEDURE — 86880 COOMBS TEST DIRECT: CPT

## 2025-08-29 RX ORDER — MAGNESIUM GLUCONATE 27 MG(500)
54 TABLET ORAL
Qty: 120 TABLET | Refills: 11 | Status: SHIPPED | OUTPATIENT
Start: 2025-08-29 | End: 2026-08-29

## 2025-08-31 LAB
CMV DNA SERPL NAA+PROBE-LOG IU: NORMAL {LOG_IU}/ML
EBV DNA SPEC NAA+PROBE-LOG#: NORMAL {LOG_COPIES}/ML
EPO SERPL-ACNC: 72 MU/ML (ref 4–27)
KAPPA LC SERPL-MCNC: 3.32 MG/DL (ref 0.33–1.94)
KAPPA LC/LAMBDA SER: 1.39 {RATIO} (ref 0.26–1.65)
LABORATORY COMMENT REPORT: NOT DETECTED
LABORATORY COMMENT REPORT: NOT DETECTED
LAMBDA LC SERPL-MCNC: 2.39 MG/DL (ref 0.57–2.63)

## 2025-09-02 ENCOUNTER — HOME CARE VISIT (OUTPATIENT)
Dept: HOME HEALTH SERVICES | Facility: HOME HEALTH | Age: 60
End: 2025-09-02
Payer: MEDICAID

## 2025-09-02 ENCOUNTER — DOCUMENTATION (OUTPATIENT)
Facility: HOSPITAL | Age: 60
End: 2025-09-02

## 2025-09-02 ENCOUNTER — PATIENT MESSAGE (OUTPATIENT)
Facility: HOSPITAL | Age: 60
End: 2025-09-02

## 2025-09-02 DIAGNOSIS — E83.42 HYPOMAGNESEMIA: Primary | ICD-10-CM

## 2025-09-02 DIAGNOSIS — E53.8 FOLIC ACID DEFICIENCY: Primary | ICD-10-CM

## 2025-09-02 LAB
CREAT UR-MCNC: 24.6 MG/DL (ref 20–320)
FLOW CYTOMETRY SPECIALIST REVIEW: NORMAL
PATH REVIEW, PNH: NORMAL
PNH RESULTS: NEGATIVE
PROT UR-ACNC: 7 MG/DL (ref 5–24)
PROT/CREAT UR: 0.28 MG/MG CREAT (ref 0–0.17)

## 2025-09-02 PROCEDURE — 85027 COMPLETE CBC AUTOMATED: CPT

## 2025-09-02 PROCEDURE — 82570 ASSAY OF URINE CREATININE: CPT

## 2025-09-02 PROCEDURE — 84156 ASSAY OF PROTEIN URINE: CPT

## 2025-09-02 PROCEDURE — RXMED WILLOW AMBULATORY MEDICATION CHARGE

## 2025-09-02 PROCEDURE — G0299 HHS/HOSPICE OF RN EA 15 MIN: HCPCS

## 2025-09-02 PROCEDURE — 83735 ASSAY OF MAGNESIUM: CPT

## 2025-09-02 PROCEDURE — 80069 RENAL FUNCTION PANEL: CPT

## 2025-09-02 PROCEDURE — 80197 ASSAY OF TACROLIMUS: CPT

## 2025-09-02 PROCEDURE — 87385 HISTOPLASMA CAPSUL AG IA: CPT

## 2025-09-02 RX ORDER — MAGNESIUM SULFATE HEPTAHYDRATE 40 MG/ML
4 INJECTION, SOLUTION INTRAVENOUS ONCE
Status: CANCELLED | OUTPATIENT
Start: 2025-09-03

## 2025-09-02 RX ORDER — FAMOTIDINE 10 MG/ML
20 INJECTION, SOLUTION INTRAVENOUS ONCE AS NEEDED
Status: CANCELLED | OUTPATIENT
Start: 2025-09-03

## 2025-09-02 RX ORDER — EPINEPHRINE 0.3 MG/.3ML
0.3 INJECTION SUBCUTANEOUS EVERY 5 MIN PRN
Status: CANCELLED | OUTPATIENT
Start: 2025-09-03

## 2025-09-02 RX ORDER — ALBUTEROL SULFATE 0.83 MG/ML
3 SOLUTION RESPIRATORY (INHALATION) AS NEEDED
Status: CANCELLED | OUTPATIENT
Start: 2025-09-03

## 2025-09-02 RX ORDER — DIPHENHYDRAMINE HYDROCHLORIDE 50 MG/ML
50 INJECTION, SOLUTION INTRAMUSCULAR; INTRAVENOUS AS NEEDED
Status: CANCELLED | OUTPATIENT
Start: 2025-09-03

## 2025-09-02 RX ORDER — FOLIC ACID 1 MG/1
1 TABLET ORAL DAILY
Qty: 90 TABLET | Refills: 3 | Status: SHIPPED | OUTPATIENT
Start: 2025-09-02

## 2025-09-03 ENCOUNTER — INFUSION (OUTPATIENT)
Dept: HEMATOLOGY/ONCOLOGY | Facility: CLINIC | Age: 60
End: 2025-09-03
Payer: MEDICAID

## 2025-09-03 ENCOUNTER — APPOINTMENT (OUTPATIENT)
Dept: TRANSPLANT | Facility: CLINIC | Age: 60
End: 2025-09-03
Payer: MEDICAID

## 2025-09-03 ENCOUNTER — HOME CARE VISIT (OUTPATIENT)
Dept: HOME HEALTH SERVICES | Facility: HOME HEALTH | Age: 60
End: 2025-09-03
Payer: MEDICAID

## 2025-09-03 VITALS
HEART RATE: 63 BPM | OXYGEN SATURATION: 98 % | DIASTOLIC BLOOD PRESSURE: 75 MMHG | TEMPERATURE: 98.4 F | WEIGHT: 146.61 LBS | SYSTOLIC BLOOD PRESSURE: 139 MMHG | BODY MASS INDEX: 26.98 KG/M2 | HEIGHT: 62 IN | RESPIRATION RATE: 16 BRPM

## 2025-09-03 VITALS
HEART RATE: 79 BPM | OXYGEN SATURATION: 97 % | TEMPERATURE: 97.4 F | SYSTOLIC BLOOD PRESSURE: 130 MMHG | DIASTOLIC BLOOD PRESSURE: 65 MMHG | RESPIRATION RATE: 16 BRPM

## 2025-09-03 DIAGNOSIS — N18.9 ANEMIA DUE TO CHRONIC KIDNEY DISEASE, UNSPECIFIED CKD STAGE: ICD-10-CM

## 2025-09-03 DIAGNOSIS — E83.42 HYPOMAGNESEMIA: ICD-10-CM

## 2025-09-03 DIAGNOSIS — D63.1 ANEMIA DUE TO CHRONIC KIDNEY DISEASE, UNSPECIFIED CKD STAGE: ICD-10-CM

## 2025-09-03 LAB
ABO GROUP (TYPE) IN BLOOD: NORMAL
ABO GROUP (TYPE) IN BLOOD: NORMAL
ANTIBODY SCREEN: NORMAL
BKV DNA SERPL NAA+PROBE-LOG#: NORMAL {LOG_COPIES}/ML
BLOOD EXPIRATION DATE: NORMAL
DISPENSE STATUS: NORMAL
EBV DNA SPEC NAA+PROBE-LOG#: NORMAL {LOG_COPIES}/ML
LABORATORY COMMENT REPORT: NOT DETECTED
LABORATORY COMMENT REPORT: NOT DETECTED
PRODUCT BLOOD TYPE: 7300
PRODUCT CODE: NORMAL
RH FACTOR (ANTIGEN D): NORMAL
RH FACTOR (ANTIGEN D): NORMAL
UNIT ABO: NORMAL
UNIT NUMBER: NORMAL
UNIT RH: NORMAL
UNIT VOLUME: 350
XM INTEP: NORMAL

## 2025-09-03 PROCEDURE — P9040 RBC LEUKOREDUCED IRRADIATED: HCPCS

## 2025-09-03 PROCEDURE — 86923 COMPATIBILITY TEST ELECTRIC: CPT

## 2025-09-03 PROCEDURE — 2500000004 HC RX 250 GENERAL PHARMACY W/ HCPCS (ALT 636 FOR OP/ED): Mod: SE | Performed by: PHYSICIAN ASSISTANT

## 2025-09-03 PROCEDURE — 96365 THER/PROPH/DIAG IV INF INIT: CPT | Mod: INF

## 2025-09-03 PROCEDURE — 36430 TRANSFUSION BLD/BLD COMPNT: CPT

## 2025-09-03 PROCEDURE — 86900 BLOOD TYPING SEROLOGIC ABO: CPT

## 2025-09-03 PROCEDURE — 96366 THER/PROPH/DIAG IV INF ADDON: CPT | Mod: INF

## 2025-09-03 RX ORDER — ALBUTEROL SULFATE 0.83 MG/ML
3 SOLUTION RESPIRATORY (INHALATION) AS NEEDED
OUTPATIENT
Start: 2025-09-03

## 2025-09-03 RX ORDER — EPINEPHRINE 0.3 MG/.3ML
0.3 INJECTION SUBCUTANEOUS EVERY 5 MIN PRN
Status: DISCONTINUED | OUTPATIENT
Start: 2025-09-03 | End: 2025-09-03 | Stop reason: HOSPADM

## 2025-09-03 RX ORDER — FAMOTIDINE 10 MG/ML
20 INJECTION, SOLUTION INTRAVENOUS ONCE AS NEEDED
Status: DISCONTINUED | OUTPATIENT
Start: 2025-09-03 | End: 2025-09-03 | Stop reason: HOSPADM

## 2025-09-03 RX ORDER — ALBUTEROL SULFATE 0.83 MG/ML
3 SOLUTION RESPIRATORY (INHALATION) AS NEEDED
Status: CANCELLED | OUTPATIENT
Start: 2025-09-03

## 2025-09-03 RX ORDER — EPINEPHRINE 0.3 MG/.3ML
0.3 INJECTION SUBCUTANEOUS EVERY 5 MIN PRN
Status: CANCELLED | OUTPATIENT
Start: 2025-09-03

## 2025-09-03 RX ORDER — HEPARIN SODIUM,PORCINE/PF 10 UNIT/ML
50 SYRINGE (ML) INTRAVENOUS AS NEEDED
OUTPATIENT
Start: 2025-09-03

## 2025-09-03 RX ORDER — HEPARIN 100 UNIT/ML
500 SYRINGE INTRAVENOUS AS NEEDED
OUTPATIENT
Start: 2025-09-03

## 2025-09-03 RX ORDER — FAMOTIDINE 10 MG/ML
20 INJECTION, SOLUTION INTRAVENOUS ONCE AS NEEDED
Status: CANCELLED | OUTPATIENT
Start: 2025-09-03

## 2025-09-03 RX ORDER — DIPHENHYDRAMINE HYDROCHLORIDE 50 MG/ML
50 INJECTION, SOLUTION INTRAMUSCULAR; INTRAVENOUS AS NEEDED
Status: DISCONTINUED | OUTPATIENT
Start: 2025-09-03 | End: 2025-09-03 | Stop reason: HOSPADM

## 2025-09-03 RX ORDER — FAMOTIDINE 10 MG/ML
20 INJECTION, SOLUTION INTRAVENOUS ONCE AS NEEDED
OUTPATIENT
Start: 2025-09-03

## 2025-09-03 RX ORDER — DIPHENHYDRAMINE HYDROCHLORIDE 50 MG/ML
50 INJECTION, SOLUTION INTRAMUSCULAR; INTRAVENOUS AS NEEDED
OUTPATIENT
Start: 2025-09-03

## 2025-09-03 RX ORDER — EPINEPHRINE 0.3 MG/.3ML
0.3 INJECTION SUBCUTANEOUS EVERY 5 MIN PRN
OUTPATIENT
Start: 2025-09-03

## 2025-09-03 RX ORDER — MAGNESIUM SULFATE HEPTAHYDRATE 40 MG/ML
4 INJECTION, SOLUTION INTRAVENOUS ONCE
Status: CANCELLED | OUTPATIENT
Start: 2025-09-03 | End: 2025-09-03

## 2025-09-03 RX ORDER — MAGNESIUM SULFATE HEPTAHYDRATE 40 MG/ML
4 INJECTION, SOLUTION INTRAVENOUS ONCE
Status: COMPLETED | OUTPATIENT
Start: 2025-09-03 | End: 2025-09-03

## 2025-09-03 RX ORDER — DIPHENHYDRAMINE HYDROCHLORIDE 50 MG/ML
50 INJECTION, SOLUTION INTRAMUSCULAR; INTRAVENOUS AS NEEDED
Status: CANCELLED | OUTPATIENT
Start: 2025-09-03

## 2025-09-03 RX ORDER — ALBUTEROL SULFATE 0.83 MG/ML
3 SOLUTION RESPIRATORY (INHALATION) AS NEEDED
Status: DISCONTINUED | OUTPATIENT
Start: 2025-09-03 | End: 2025-09-03 | Stop reason: HOSPADM

## 2025-09-03 RX ADMIN — MAGNESIUM SULFATE 4 G: 4 INJECTION INTRAVENOUS at 12:07

## 2025-09-03 ASSESSMENT — ENCOUNTER SYMPTOMS
APPETITE LEVEL: GOOD
DENIES PAIN: 1
OCCASIONAL FEELINGS OF UNSTEADINESS: 0
PERSON REPORTING PAIN: PATIENT

## 2025-09-03 ASSESSMENT — PAIN SCALES - GENERAL: PAINLEVEL_OUTOF10: 0-NO PAIN

## 2025-09-04 ENCOUNTER — APPOINTMENT (OUTPATIENT)
Facility: HOSPITAL | Age: 60
End: 2025-09-04
Payer: MEDICAID

## 2025-09-04 ENCOUNTER — OFFICE VISIT (OUTPATIENT)
Facility: HOSPITAL | Age: 60
End: 2025-09-04
Payer: MEDICAID

## 2025-09-04 ENCOUNTER — PHARMACY VISIT (OUTPATIENT)
Dept: PHARMACY | Facility: CLINIC | Age: 60
End: 2025-09-04
Payer: MEDICAID

## 2025-09-04 VITALS
TEMPERATURE: 97.8 F | WEIGHT: 145.5 LBS | SYSTOLIC BLOOD PRESSURE: 155 MMHG | BODY MASS INDEX: 26.27 KG/M2 | DIASTOLIC BLOOD PRESSURE: 80 MMHG | HEART RATE: 76 BPM | OXYGEN SATURATION: 98 %

## 2025-09-04 DIAGNOSIS — D50.9 MICROCYTIC NORMOCHROMIC ANEMIA: ICD-10-CM

## 2025-09-04 DIAGNOSIS — D64.9 ANEMIA, UNSPECIFIED TYPE: ICD-10-CM

## 2025-09-04 DIAGNOSIS — Z92.25 PERSONAL HISTORY OF IMMUNOSUPRESSION THERAPY: ICD-10-CM

## 2025-09-04 DIAGNOSIS — Z94.0 KIDNEY REPLACED BY TRANSPLANT (HHS-HCC): ICD-10-CM

## 2025-09-04 DIAGNOSIS — R60.0 LOCALIZED EDEMA: ICD-10-CM

## 2025-09-04 DIAGNOSIS — D63.1 ANEMIA DUE TO CHRONIC KIDNEY DISEASE, UNSPECIFIED CKD STAGE: ICD-10-CM

## 2025-09-04 DIAGNOSIS — K90.9 IDIOPATHIC STEATORRHEA (HHS-HCC): Primary | ICD-10-CM

## 2025-09-04 DIAGNOSIS — N18.9 ANEMIA DUE TO CHRONIC KIDNEY DISEASE, UNSPECIFIED CKD STAGE: ICD-10-CM

## 2025-09-04 DIAGNOSIS — E83.42 HYPOMAGNESEMIA: Primary | ICD-10-CM

## 2025-09-04 LAB
ALBUMIN SERPL BCP-MCNC: 3.5 G/DL (ref 3.4–5)
ALBUMIN: 3.3 G/DL (ref 3.4–5)
ALPHA 1 GLOBULIN: 0.5 G/DL (ref 0.2–0.6)
ALPHA 2 GLOBULIN: 0.8 G/DL (ref 0.4–1.1)
ANION GAP SERPL CALC-SCNC: 15 MMOL/L (ref 10–20)
BETA GLOBULIN: 0.7 G/DL (ref 0.5–1.2)
BUN SERPL-MCNC: 13 MG/DL (ref 6–23)
CALCIUM SERPL-MCNC: 8.8 MG/DL (ref 8.6–10.6)
CHLORIDE SERPL-SCNC: 105 MMOL/L (ref 98–107)
CO2 SERPL-SCNC: 24 MMOL/L (ref 21–32)
CREAT SERPL-MCNC: 0.88 MG/DL (ref 0.5–1.05)
EGFRCR SERPLBLD CKD-EPI 2021: 74 ML/MIN/1.73M*2
ERYTHROCYTE [DISTWIDTH] IN BLOOD BY AUTOMATED COUNT: 16.8 % (ref 11.5–14.5)
GAMMA GLOBULIN: 0.9 G/DL (ref 0.5–1.4)
GLUCOSE SERPL-MCNC: 177 MG/DL (ref 74–99)
HCT VFR BLD AUTO: 28.2 % (ref 36–46)
HGB BLD-MCNC: 8.8 G/DL (ref 12–16)
IMMUNOFIXATION COMMENT: ABNORMAL
MAGNESIUM SERPL-MCNC: 1.96 MG/DL (ref 1.6–2.4)
MCH RBC QN AUTO: 29.7 PG (ref 26–34)
MCHC RBC AUTO-ENTMCNC: 31.2 G/DL (ref 32–36)
MCV RBC AUTO: 95 FL (ref 80–100)
NRBC BLD-RTO: 0 /100 WBCS (ref 0–0)
PATH REVIEW - SERUM IMMUNOFIXATION: ABNORMAL
PATH REVIEW-SERUM PROTEIN ELECTROPHORESIS: ABNORMAL
PHOSPHATE SERPL-MCNC: 3 MG/DL (ref 2.5–4.9)
PLATELET # BLD AUTO: 164 X10*3/UL (ref 150–450)
POTASSIUM SERPL-SCNC: 4.1 MMOL/L (ref 3.5–5.3)
PROTEIN ELECTROPHORESIS COMMENT: ABNORMAL
RBC # BLD AUTO: 2.96 X10*6/UL (ref 4–5.2)
SODIUM SERPL-SCNC: 140 MMOL/L (ref 136–145)
TACROLIMUS BLD-MCNC: 9.9 NG/ML
WBC # BLD AUTO: 8 X10*3/UL (ref 4.4–11.3)

## 2025-09-04 PROCEDURE — 80069 RENAL FUNCTION PANEL: CPT | Performed by: SURGERY

## 2025-09-04 PROCEDURE — 85027 COMPLETE CBC AUTOMATED: CPT | Performed by: SURGERY

## 2025-09-04 PROCEDURE — 80197 ASSAY OF TACROLIMUS: CPT | Performed by: SURGERY

## 2025-09-04 PROCEDURE — 83735 ASSAY OF MAGNESIUM: CPT | Performed by: SURGERY

## 2025-09-04 RX ORDER — DIPHENHYDRAMINE HYDROCHLORIDE 50 MG/ML
50 INJECTION, SOLUTION INTRAMUSCULAR; INTRAVENOUS AS NEEDED
OUTPATIENT
Start: 2025-09-11

## 2025-09-04 RX ORDER — ALBUTEROL SULFATE 0.83 MG/ML
3 SOLUTION RESPIRATORY (INHALATION) AS NEEDED
OUTPATIENT
Start: 2025-09-11

## 2025-09-04 RX ORDER — FAMOTIDINE 10 MG/ML
20 INJECTION, SOLUTION INTRAVENOUS ONCE AS NEEDED
OUTPATIENT
Start: 2025-09-11

## 2025-09-04 RX ORDER — FUROSEMIDE 20 MG/1
20 TABLET ORAL AS NEEDED
Qty: 30 TABLET | Refills: 11 | Status: SHIPPED | OUTPATIENT
Start: 2025-09-04 | End: 2026-09-04

## 2025-09-04 RX ORDER — EPINEPHRINE 0.3 MG/.3ML
0.3 INJECTION SUBCUTANEOUS EVERY 5 MIN PRN
OUTPATIENT
Start: 2025-09-11

## 2025-09-04 RX ORDER — VIT C/E/ZN/COPPR/LUTEIN/ZEAXAN 250MG-90MG
1000 CAPSULE ORAL DAILY
Qty: 60 TABLET | Refills: 11 | Status: SHIPPED | OUTPATIENT
Start: 2025-09-04 | End: 2026-09-04

## 2025-09-04 RX ORDER — MYCOPHENOLATE MOFETIL 250 MG/1
750 CAPSULE ORAL 2 TIMES DAILY
Qty: 180 CAPSULE | Refills: 11 | Status: SHIPPED | OUTPATIENT
Start: 2025-09-04 | End: 2026-09-04

## 2025-09-04 ASSESSMENT — ENCOUNTER SYMPTOMS
CHEST TIGHTNESS: 0
NEUROLOGICAL NEGATIVE: 1
CARDIOVASCULAR NEGATIVE: 1
RESPIRATORY NEGATIVE: 1
PSYCHIATRIC NEGATIVE: 1
SHORTNESS OF BREATH: 0
POLYPHAGIA: 0
DYSURIA: 0
POLYDIPSIA: 0
CONSTIPATION: 0
HEMATOLOGIC/LYMPHATIC NEGATIVE: 1
ABDOMINAL DISTENTION: 0
HEMATURIA: 0
COUGH: 0
FLANK PAIN: 0
BLOOD IN STOOL: 0

## 2025-09-04 ASSESSMENT — PAIN SCALES - GENERAL: PAINLEVEL_OUTOF10: 0-NO PAIN

## 2025-09-05 LAB
H CAPSUL AG UR QL: NOT DETECTED
SCAN RESULT: NORMAL

## 2025-09-18 ENCOUNTER — APPOINTMENT (OUTPATIENT)
Dept: PHARMACY | Facility: HOSPITAL | Age: 60
End: 2025-09-18
Payer: MEDICAID

## 2025-11-17 ENCOUNTER — APPOINTMENT (OUTPATIENT)
Dept: HEMATOLOGY/ONCOLOGY | Facility: CLINIC | Age: 60
End: 2025-11-17
Payer: MEDICAID

## (undated) DEVICE — BINDER, ABDOMINAL, 4 PANEL, 12 X 30-45 IN

## (undated) DEVICE — DRAPE, FLUID WARMER

## (undated) DEVICE — SPONGE, HEMOSTATIC, CELLULOSE, SURGICEL, NU-KNIT, 6 X 9 IN

## (undated) DEVICE — SPONGE, HEMOSTATIC, CELLULOSE, SURGICEL, NU-KNIT, 3 X 4 IN

## (undated) DEVICE — Device

## (undated) DEVICE — TIP, SUCTION, YANKAUER, FLEXIBLE

## (undated) DEVICE — MANIFOLD, 4 PORT NEPTUNE STANDARD

## (undated) DEVICE — CATHETER TRAY, SURESTEP, 16FR, URINE METER W/STATLOCK

## (undated) DEVICE — INFUSION SET, LOW SORBING, W/O INJECTION PORTS, DEHP FREE, APPROX LENGTH 107IN

## (undated) DEVICE — SPONGE, LAP, XRAY DECT, SC+RFID, 12X12, STERILE

## (undated) DEVICE — PAD, GROUNDING, ELECTROSURGICAL, DUAL

## (undated) DEVICE — DRAPE, MAGENTIC INSTRUMENT, 12X16

## (undated) DEVICE — SYRINGE, 20 CC, LUER LOCK, MONOJECT, W/O CAP, LF

## (undated) DEVICE — COVER, EQUIPMENT, SOLUTION, SLUSH, 112 X 168 CM, LF, STERILE

## (undated) DEVICE — DRESSING, GAUZE, 16 PLY, 4 X 4 IN, STERILE

## (undated) DEVICE — DRAIN, CHANNEL, HUBLESS, 1/4 ROUND FULL FLUTE, 19 FR/W 1/4" TROCAR"

## (undated) DEVICE — DRESSING, TRANSPARENT, TEGADERM, 4 X 4-3/4 IN

## (undated) DEVICE — HANDPIECE, ABC, SINGLE FUNCTION, MALLEABLE, W/CORD & HOLSTER, BEND-A-BEAM, 3 IN, LF

## (undated) DEVICE — BAG, DECANTER

## (undated) DEVICE — ELECTRODE, ELECTROSURGICAL, BLADE EXT 4 INCH, INSULATED

## (undated) DEVICE — EVACUATOR, WOUND, SUCTION, CLOSED, JACKSON-PRATT, 100 CC, SILICONE

## (undated) DEVICE — COVER, TABLE, 44 X 75 IN, DISPOSABLE, LF, STERILE

## (undated) DEVICE — APPLICATOR, CHLORAPREP, W/ORANGE TINT, 26ML